# Patient Record
Sex: FEMALE | Race: BLACK OR AFRICAN AMERICAN | NOT HISPANIC OR LATINO
[De-identification: names, ages, dates, MRNs, and addresses within clinical notes are randomized per-mention and may not be internally consistent; named-entity substitution may affect disease eponyms.]

---

## 2017-03-22 ENCOUNTER — RESULT REVIEW (OUTPATIENT)
Age: 47
End: 2017-03-22

## 2017-05-25 ENCOUNTER — OUTPATIENT (OUTPATIENT)
Dept: OUTPATIENT SERVICES | Facility: HOSPITAL | Age: 47
LOS: 1 days | End: 2017-05-25
Payer: COMMERCIAL

## 2017-05-25 DIAGNOSIS — M79.606 PAIN IN LEG, UNSPECIFIED: ICD-10-CM

## 2017-05-25 PROCEDURE — 93970 EXTREMITY STUDY: CPT | Mod: 26

## 2017-05-25 PROCEDURE — 93970 EXTREMITY STUDY: CPT

## 2017-06-01 ENCOUNTER — APPOINTMENT (OUTPATIENT)
Dept: ENDOCRINOLOGY | Facility: CLINIC | Age: 47
End: 2017-06-01

## 2017-06-01 DIAGNOSIS — Z83.3 FAMILY HISTORY OF DIABETES MELLITUS: ICD-10-CM

## 2017-06-01 DIAGNOSIS — Z82.49 FAMILY HISTORY OF ISCHEMIC HEART DISEASE AND OTHER DISEASES OF THE CIRCULATORY SYSTEM: ICD-10-CM

## 2017-06-02 LAB — TSH SERPL-ACNC: 0.35 UIU/ML

## 2017-06-06 ENCOUNTER — APPOINTMENT (OUTPATIENT)
Dept: CARDIOLOGY | Facility: CLINIC | Age: 47
End: 2017-06-06

## 2017-06-06 VITALS
HEIGHT: 67 IN | OXYGEN SATURATION: 99 % | HEART RATE: 87 BPM | DIASTOLIC BLOOD PRESSURE: 81 MMHG | SYSTOLIC BLOOD PRESSURE: 122 MMHG

## 2017-10-20 ENCOUNTER — EMERGENCY (EMERGENCY)
Facility: HOSPITAL | Age: 47
LOS: 1 days | End: 2017-10-20
Attending: EMERGENCY MEDICINE | Admitting: EMERGENCY MEDICINE
Payer: COMMERCIAL

## 2017-10-20 VITALS
OXYGEN SATURATION: 100 % | TEMPERATURE: 98 F | HEART RATE: 73 BPM | DIASTOLIC BLOOD PRESSURE: 87 MMHG | RESPIRATION RATE: 18 BRPM | SYSTOLIC BLOOD PRESSURE: 137 MMHG

## 2017-10-20 VITALS
RESPIRATION RATE: 18 BRPM | HEART RATE: 59 BPM | SYSTOLIC BLOOD PRESSURE: 132 MMHG | OXYGEN SATURATION: 100 % | TEMPERATURE: 98 F

## 2017-10-20 LAB
ALBUMIN SERPL ELPH-MCNC: 4.5 G/DL — SIGNIFICANT CHANGE UP (ref 3.3–5)
ALP SERPL-CCNC: 50 U/L — SIGNIFICANT CHANGE UP (ref 40–120)
ALT FLD-CCNC: 10 U/L RC — SIGNIFICANT CHANGE UP (ref 10–45)
ANION GAP SERPL CALC-SCNC: 12 MMOL/L — SIGNIFICANT CHANGE UP (ref 5–17)
AST SERPL-CCNC: 20 U/L — SIGNIFICANT CHANGE UP (ref 10–40)
BASOPHILS # BLD AUTO: 0.1 K/UL — SIGNIFICANT CHANGE UP (ref 0–0.2)
BASOPHILS NFR BLD AUTO: 2.3 % — HIGH (ref 0–2)
BILIRUB SERPL-MCNC: 0.3 MG/DL — SIGNIFICANT CHANGE UP (ref 0.2–1.2)
BUN SERPL-MCNC: 14 MG/DL — SIGNIFICANT CHANGE UP (ref 7–23)
CALCIUM SERPL-MCNC: 9.3 MG/DL — SIGNIFICANT CHANGE UP (ref 8.4–10.5)
CHLORIDE SERPL-SCNC: 104 MMOL/L — SIGNIFICANT CHANGE UP (ref 96–108)
CK MB CFR SERPL CALC: 1.5 NG/ML — SIGNIFICANT CHANGE UP (ref 0–3.8)
CO2 SERPL-SCNC: 25 MMOL/L — SIGNIFICANT CHANGE UP (ref 22–31)
CREAT SERPL-MCNC: 1.17 MG/DL — SIGNIFICANT CHANGE UP (ref 0.5–1.3)
EOSINOPHIL # BLD AUTO: 0.1 K/UL — SIGNIFICANT CHANGE UP (ref 0–0.5)
EOSINOPHIL NFR BLD AUTO: 2.6 % — SIGNIFICANT CHANGE UP (ref 0–6)
GLUCOSE SERPL-MCNC: 77 MG/DL — SIGNIFICANT CHANGE UP (ref 70–99)
HCT VFR BLD CALC: 42.8 % — SIGNIFICANT CHANGE UP (ref 34.5–45)
HGB BLD-MCNC: 14.3 G/DL — SIGNIFICANT CHANGE UP (ref 11.5–15.5)
LYMPHOCYTES # BLD AUTO: 1.8 K/UL — SIGNIFICANT CHANGE UP (ref 1–3.3)
LYMPHOCYTES # BLD AUTO: 45.8 % — HIGH (ref 13–44)
MCHC RBC-ENTMCNC: 29.3 PG — SIGNIFICANT CHANGE UP (ref 27–34)
MCHC RBC-ENTMCNC: 33.3 GM/DL — SIGNIFICANT CHANGE UP (ref 32–36)
MCV RBC AUTO: 87.9 FL — SIGNIFICANT CHANGE UP (ref 80–100)
MONOCYTES # BLD AUTO: 0.3 K/UL — SIGNIFICANT CHANGE UP (ref 0–0.9)
MONOCYTES NFR BLD AUTO: 7 % — SIGNIFICANT CHANGE UP (ref 2–14)
NEUTROPHILS # BLD AUTO: 1.7 K/UL — LOW (ref 1.8–7.4)
NEUTROPHILS NFR BLD AUTO: 42.3 % — LOW (ref 43–77)
PLATELET # BLD AUTO: 165 K/UL — SIGNIFICANT CHANGE UP (ref 150–400)
POTASSIUM SERPL-MCNC: 3.9 MMOL/L — SIGNIFICANT CHANGE UP (ref 3.5–5.3)
POTASSIUM SERPL-SCNC: 3.9 MMOL/L — SIGNIFICANT CHANGE UP (ref 3.5–5.3)
PROT SERPL-MCNC: 8.1 G/DL — SIGNIFICANT CHANGE UP (ref 6–8.3)
RBC # BLD: 4.87 M/UL — SIGNIFICANT CHANGE UP (ref 3.8–5.2)
RBC # FLD: 14.1 % — SIGNIFICANT CHANGE UP (ref 10.3–14.5)
SODIUM SERPL-SCNC: 141 MMOL/L — SIGNIFICANT CHANGE UP (ref 135–145)
TROPONIN T SERPL-MCNC: <0.01 NG/ML — SIGNIFICANT CHANGE UP (ref 0–0.06)
TROPONIN T SERPL-MCNC: <0.01 NG/ML — SIGNIFICANT CHANGE UP (ref 0–0.06)
WBC # BLD: 3.9 K/UL — SIGNIFICANT CHANGE UP (ref 3.8–10.5)
WBC # FLD AUTO: 3.9 K/UL — SIGNIFICANT CHANGE UP (ref 3.8–10.5)

## 2017-10-20 PROCEDURE — 85027 COMPLETE CBC AUTOMATED: CPT

## 2017-10-20 PROCEDURE — 80053 COMPREHEN METABOLIC PANEL: CPT

## 2017-10-20 PROCEDURE — 71046 X-RAY EXAM CHEST 2 VIEWS: CPT

## 2017-10-20 PROCEDURE — 93005 ELECTROCARDIOGRAM TRACING: CPT | Mod: 76

## 2017-10-20 PROCEDURE — 84484 ASSAY OF TROPONIN QUANT: CPT

## 2017-10-20 PROCEDURE — 71020: CPT | Mod: 26

## 2017-10-20 PROCEDURE — 99285 EMERGENCY DEPT VISIT HI MDM: CPT | Mod: 25

## 2017-10-20 PROCEDURE — 93010 ELECTROCARDIOGRAM REPORT: CPT

## 2017-10-20 PROCEDURE — 82553 CREATINE MB FRACTION: CPT

## 2017-10-20 PROCEDURE — 99284 EMERGENCY DEPT VISIT MOD MDM: CPT | Mod: 25

## 2017-10-20 RX ORDER — SODIUM CHLORIDE 9 MG/ML
3 INJECTION INTRAMUSCULAR; INTRAVENOUS; SUBCUTANEOUS ONCE
Qty: 0 | Refills: 0 | Status: COMPLETED | OUTPATIENT
Start: 2017-10-20 | End: 2017-10-20

## 2017-10-20 RX ORDER — ASPIRIN/CALCIUM CARB/MAGNESIUM 324 MG
81 TABLET ORAL ONCE
Qty: 0 | Refills: 0 | Status: COMPLETED | OUTPATIENT
Start: 2017-10-20 | End: 2017-10-20

## 2017-10-20 RX ADMIN — SODIUM CHLORIDE 3 MILLILITER(S): 9 INJECTION INTRAMUSCULAR; INTRAVENOUS; SUBCUTANEOUS at 16:14

## 2017-10-20 RX ADMIN — Medication 81 MILLIGRAM(S): at 16:14

## 2017-10-20 NOTE — ED PROVIDER NOTE - PROGRESS NOTE DETAILS
Patient wants outpt follow-up after 2nd trop, offered CDU stay.  - Mike Balderrama MD ATTD: chest pain improved. Cardiology evaluated patient, recommended check serial enzymes. discussed with patient CDU vs out pt and she would prefer outpt follow up with her cardiologist Dr. Waters and pmd.

## 2017-10-20 NOTE — ED ADULT NURSE NOTE - OBJECTIVE STATEMENT
47 yr old female amb to ed c/o midsternal chest pressure rad l jaw x2 hrs agp Afebrile denies sweating denies sob Resp even and nonlab denies abd pain denies n/v Works in [employer]

## 2017-10-20 NOTE — ED PROVIDER NOTE - OBJECTIVE STATEMENT
47y Female complaining of chest pain since last night. Low risk, has had a stress test last year. 47y Female PMH fibroids, hypothyroidism (no DM, HTN or HLD) complaining of chest pain since last night, persistent to this afternoon. Has had this type of pain in the past, has had a stress test last year that was unremarkable. Mainly in the substernal area to the jaw, no other radiation. Denies fevers, chills, nausea, vomiting, diarrhea, constipation, chest pain, or dyspnea. No obvious sick contacts, no recent travel, no leg swelling or pain. Patient works in the cardiology department, works closely with in house cardiology teams.    PCP / Cardiologist: Dr. Eris López

## 2017-10-20 NOTE — ED PROVIDER NOTE - CARE PLAN
Principal Discharge DX:	Chest pain  Instructions for follow-up, activity and diet:	1) Please follow-up with your primary care doctor / cardiologist Dr. Eirs López within the next 1-2 days.  Please call today or tomorrow for an appointment.  If you cannot follow-up with your doctor(s), please return to the ED for any urgent issues.  2) If you have any worsening of symptoms or any other concerns please return to the ED immediately.  3) Please continue taking your home medications as directed.  4) You may have been given a copy of your labs and/or imaging.  Please go over these with your primary care doctor.

## 2017-10-20 NOTE — ED PROVIDER NOTE - PLAN OF CARE
1) Please follow-up with your primary care doctor / cardiologist Dr. Eris López within the next 1-2 days.  Please call today or tomorrow for an appointment.  If you cannot follow-up with your doctor(s), please return to the ED for any urgent issues.  2) If you have any worsening of symptoms or any other concerns please return to the ED immediately.  3) Please continue taking your home medications as directed.  4) You may have been given a copy of your labs and/or imaging.  Please go over these with your primary care doctor.

## 2017-10-20 NOTE — CONSULT NOTE ADULT - SUBJECTIVE AND OBJECTIVE BOX
Patient seen and evaluated @ 5:45 PM  Chief Complaint: Chest pain    HPI:  47F with hypothyroidism s/p thyroidectomy, hx of myomectomy presents for chest pressure onset yesterday. Patient was at work in the cath lab, onset of chest pressure radiating to jaw but not left arm. No diaphoresis, nausea, palpitations. Mild SOB with initial onset. Pain is 5/10 without radiation. Has not improved since yesterday. No recent travel. No hx of DVT/PE.     No prior hx of chest pain.    Dr. Nestor López is patient's PMD.     PMH:   Chronic back pain  Obese  Cold thyroid nodule  Fibroids  Hypothyroidism    PSH:   H/O arthroscopy of shoulder  History of subtotal thyroidectomy  H/O myomectomy    Medications:  none    Allergies:  No Known Allergies    FAMILY HISTORY:  Family history of diabetic complications (Sibling, Sibling)  Family history of congestive heart failure (Father)  Family history of hypertension (Father)    Social History:  No significant illicit drug use    Review of Systems:  Constitutional: [ ] Fever [ ] Chills [ ] Fatigue [ ] Weight change   HEENT: [ ] Blurred vision [ ] Eye Pain [ ] Headache [ ] Runny nose [ ] Sore Throat   Respiratory: [ ] Cough [ ] Wheezing [ ] Shortness of breath  Cardiovascular: [x] Chest Pain [ ] Palpitations [ ] TORRES [ ] PND [ ] Orthopnea  Gastrointestinal: [ ] Abdominal Pain [ ] Diarrhea [ ] Constipation [ ] Hemorrhoids [ ] Nausea [ ] Vomiting  Genitourinary: [ ] Nocturia [ ] Dysuria [ ] Incontinence  Extremities: [ ] Swelling [ ] Joint Pain  Neurologic: [ ] Focal deficit [ ] Paresthesias [ ] Syncope  Lymphatic: [ ] Swelling [ ] Lymphadenopathy   Skin: [ ] Rash [ ] Ecchymoses [ ] Wounds [ ] Lesions  Psychiatry: [ ] Depression [ ] Suicidal/Homicidal Ideation [ ] Anxiety [ ] Sleep Disturbances  [ ] 10 point review of systems is otherwise negative except as mentioned above            [ ]Unable to obtain    Physical Exam:  T(C): 36.7 (10-20-17 @ 16:05), Max: 36.7 (10-20-17 @ 16:05)  HR: 73 (10-20-17 @ 16:05) (73 - 73)  BP: 137/87 (10-20-17 @ 16:05) (137/87 - 137/87)  RR: 18 (10-20-17 @ 16:05) (18 - 18)  SpO2: 100% (10-20-17 @ 16:05) (100% - 100%)  Wt(kg): --    Daily     Daily     Appearance: NAD  Eyes: PERRL, EOMI  HENT: Normal oral [place], NC/AT  Cardiovascular: normal S1 and S2, RRR, no m/r/g, no edema, normal JVP  Respiratory: Clear to auscultation bilaterally  Gastrointestinal: Soft, non-tender, non-distended, BS+  Musculoskeletal: No clubbing, no joint deformity   Neurologic: Non-focal  Lymphatic: No lymphadenopathy  Psychiatry: AAOx3, mood & affect appropriate  Skin: No rashes, no ecchymoses, no cyanosis    Cardiovascular Diagnostic Testing:  ECG: NSR 60 bpm, LAFB, nonspecific ST-T wave changes similar to prior    Stress Testing/Echo:   11/10/16  Echocardiographic Study:  (A) Baseline echocardiogram reveals:  1. Normal left ventricular internal dimensions and wall  thicknesses.  2. Normal left ventricular systolic function. No segmental  wall motion abnormalities.  3. Normal diastolic function  4. Normal right ventricular size and function.  5. Estimated pulmonary artery systolic pressure equals 27  mm Hg, assuming right atrial pressure equals 8  mm Hg,  consistent with normal pulmonary pressures.  (B) Stress echocardiogram reveals:  Normal augmentation in left ventricular systolic function.  ------------------------------------------------------------------------  Conclusions:  1. Exercise capacity is 11 METS, which is excellent for age  and gender.  2. Normal hemodynamic response.  3. Normal electrocardiographic response.  4. Normal augmentation in left ventricular systolic  function.  5. Normal stress echocardiogram with no evidence of  inducible ischemia.  *** No previous Echo exam.    Cath: none    Interpretation of Telemetry:    Imaging:  CXR no signs of acute cardiopulmonary disease    Labs:                        14.3   3.9   )-----------( 165      ( 20 Oct 2017 16:10 )             42.8     10-20    141  |  104  |  14  ----------------------------<  77  3.9   |  25  |  1.17    Ca    9.3      20 Oct 2017 16:10    TPro  8.1  /  Alb  4.5  /  TBili  0.3  /  DBili  x   /  AST  20  /  ALT  10  /  AlkPhos  50  10-20      CARDIAC MARKERS ( 20 Oct 2017 16:10 )  x     / <0.01 ng/mL / x     / x     / 1.5 ng/mL

## 2017-10-20 NOTE — ED PROVIDER NOTE - ATTENDING CONTRIBUTION TO CARE
48 y/o f with pmhx hypothyroid, s/p thyroidectomy, myomectomy presents for chest pressure that began earlier today. 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 Last had a stress test approx 1 yr ago and was told normal. no fever. no cough no chills. no abd pain. no weakness no numbness. no abd pain. no recent travel. denies tob. no recent travel. no back pain. no tearing pain.   Cardiologist Dr. Nestor Waters  Gen. no resp distress, appears mild discomfort from chest pain.  Non toxic   HEENT: EOMI, mmm,   Lungs: CTAB/L no C/ W /R   CVS: S1S2   Abd; Soft non tender, non distended   Ext: no edema   Neuro AAOx3 non focal clear speech

## 2017-10-20 NOTE — ED PROVIDER NOTE - PMH
Chronic back pain  on valium PRN for spasm  Cold thyroid nodule    Fibroids    Hypothyroidism    Obese

## 2017-10-20 NOTE — CONSULT NOTE ADULT - ASSESSMENT
47F with hypothyroidism s/p thyroidectomy, hx of myomectomy presents for chest pressure concerning for UA however no prior cardiac history. MASOOD score 75 correlates with 0.4% of in-hospital death. Reporting a significant amount of stress, ?Takotsubo's CM.    --  mg  -- Atorvastatin 80 mg  -- would defer ticagrelor currently  -- would defer heparin gtt currently  -- repeat second set of troponin  -- if negative would consider discharge with close follow up vs CDU admission for TTE in AM  -- trial of nitrate therapy  -- trend troponin  -- serial EKG  -- check BNP    Bhraathi Valentine MD

## 2017-10-24 ENCOUNTER — RESULT REVIEW (OUTPATIENT)
Age: 47
End: 2017-10-24

## 2017-10-24 ENCOUNTER — APPOINTMENT (OUTPATIENT)
Dept: HUMAN REPRODUCTION | Facility: CLINIC | Age: 47
End: 2017-10-24
Payer: COMMERCIAL

## 2017-10-24 PROCEDURE — 99214 OFFICE O/P EST MOD 30 MIN: CPT | Mod: 25

## 2017-10-24 PROCEDURE — 58340 CATHETER FOR HYSTEROGRAPHY: CPT

## 2017-10-24 PROCEDURE — 76830 TRANSVAGINAL US NON-OB: CPT

## 2017-10-24 PROCEDURE — 58999 UNLISTED PX FML GENITAL SYS: CPT

## 2017-10-24 PROCEDURE — 81025 URINE PREGNANCY TEST: CPT

## 2017-10-31 ENCOUNTER — OTHER (OUTPATIENT)
Age: 47
End: 2017-10-31

## 2017-11-03 ENCOUNTER — APPOINTMENT (OUTPATIENT)
Dept: CARDIOLOGY | Facility: CLINIC | Age: 47
End: 2017-11-03

## 2017-11-03 ENCOUNTER — OUTPATIENT (OUTPATIENT)
Dept: OUTPATIENT SERVICES | Facility: HOSPITAL | Age: 47
LOS: 1 days | End: 2017-11-03
Payer: COMMERCIAL

## 2017-11-03 DIAGNOSIS — R07.9 CHEST PAIN, UNSPECIFIED: ICD-10-CM

## 2017-11-03 DIAGNOSIS — R94.31 ABNORMAL ELECTROCARDIOGRAM [ECG] [EKG]: ICD-10-CM

## 2017-11-03 DIAGNOSIS — R07.89 OTHER CHEST PAIN: ICD-10-CM

## 2017-11-03 PROCEDURE — 75574 CT ANGIO HRT W/3D IMAGE: CPT

## 2017-11-03 PROCEDURE — 75574 CT ANGIO HRT W/3D IMAGE: CPT | Mod: 26

## 2018-05-02 ENCOUNTER — RESULT REVIEW (OUTPATIENT)
Age: 48
End: 2018-05-02

## 2018-05-16 ENCOUNTER — RESULT REVIEW (OUTPATIENT)
Age: 48
End: 2018-05-16

## 2018-08-09 ENCOUNTER — OUTPATIENT (OUTPATIENT)
Dept: OUTPATIENT SERVICES | Facility: HOSPITAL | Age: 48
LOS: 1 days | End: 2018-08-09
Payer: COMMERCIAL

## 2018-08-09 ENCOUNTER — APPOINTMENT (OUTPATIENT)
Dept: CARDIOLOGY | Facility: CLINIC | Age: 48
End: 2018-08-09
Payer: COMMERCIAL

## 2018-08-09 ENCOUNTER — NON-APPOINTMENT (OUTPATIENT)
Age: 48
End: 2018-08-09

## 2018-08-09 ENCOUNTER — CHART COPY (OUTPATIENT)
Age: 48
End: 2018-08-09

## 2018-08-09 VITALS
BODY MASS INDEX: 33.43 KG/M2 | SYSTOLIC BLOOD PRESSURE: 129 MMHG | HEART RATE: 70 BPM | HEIGHT: 67 IN | WEIGHT: 213 LBS | OXYGEN SATURATION: 97 % | DIASTOLIC BLOOD PRESSURE: 84 MMHG

## 2018-08-09 DIAGNOSIS — R07.89 OTHER CHEST PAIN: ICD-10-CM

## 2018-08-09 PROCEDURE — 71046 X-RAY EXAM CHEST 2 VIEWS: CPT | Mod: 26

## 2018-08-09 PROCEDURE — 99214 OFFICE O/P EST MOD 30 MIN: CPT

## 2018-08-09 PROCEDURE — 93000 ELECTROCARDIOGRAM COMPLETE: CPT

## 2018-08-09 PROCEDURE — 71046 X-RAY EXAM CHEST 2 VIEWS: CPT

## 2018-08-09 RX ORDER — CYCLOBENZAPRINE HYDROCHLORIDE 10 MG/1
10 TABLET, FILM COATED ORAL
Qty: 30 | Refills: 0 | Status: DISCONTINUED | COMMUNITY
Start: 2017-06-01 | End: 2018-08-09

## 2018-08-09 RX ORDER — FAMOTIDINE 20 MG/1
20 TABLET, FILM COATED ORAL
Qty: 30 | Refills: 0 | Status: DISCONTINUED | COMMUNITY
Start: 2017-06-06 | End: 2018-08-09

## 2018-10-05 ENCOUNTER — OTHER (OUTPATIENT)
Age: 48
End: 2018-10-05

## 2018-10-25 ENCOUNTER — APPOINTMENT (OUTPATIENT)
Dept: ENDOCRINOLOGY | Facility: CLINIC | Age: 48
End: 2018-10-25
Payer: COMMERCIAL

## 2018-10-25 VITALS
SYSTOLIC BLOOD PRESSURE: 122 MMHG | DIASTOLIC BLOOD PRESSURE: 80 MMHG | HEIGHT: 67 IN | OXYGEN SATURATION: 98 % | HEART RATE: 78 BPM | BODY MASS INDEX: 34.18 KG/M2 | WEIGHT: 217.8 LBS

## 2018-10-25 LAB
ALBUMIN SERPL ELPH-MCNC: 4.1 G/DL
ALP BLD-CCNC: 40 U/L
ALT SERPL-CCNC: 7 U/L
ANION GAP SERPL CALC-SCNC: 11 MMOL/L
AST SERPL-CCNC: 15 U/L
BILIRUB SERPL-MCNC: 0.3 MG/DL
BUN SERPL-MCNC: 12 MG/DL
CALCIUM SERPL-MCNC: 9.1 MG/DL
CHLORIDE SERPL-SCNC: 105 MMOL/L
CO2 SERPL-SCNC: 26 MMOL/L
CREAT SERPL-MCNC: 0.8 MG/DL
GLUCOSE SERPL-MCNC: 86 MG/DL
HBA1C MFR BLD HPLC: 4.4 %
POTASSIUM SERPL-SCNC: 4 MMOL/L
PROT SERPL-MCNC: 6.7 G/DL
SODIUM SERPL-SCNC: 142 MMOL/L
TSH SERPL-ACNC: 0.46 UIU/ML

## 2018-10-25 PROCEDURE — 99213 OFFICE O/P EST LOW 20 MIN: CPT

## 2018-10-25 RX ORDER — AZITHROMYCIN 250 MG/1
250 TABLET, FILM COATED ORAL
Qty: 6 | Refills: 0 | Status: DISCONTINUED | COMMUNITY
Start: 2018-08-11 | End: 2018-10-25

## 2018-11-20 ENCOUNTER — APPOINTMENT (OUTPATIENT)
Dept: HUMAN REPRODUCTION | Facility: CLINIC | Age: 48
End: 2018-11-20
Payer: COMMERCIAL

## 2018-11-20 PROCEDURE — 99214 OFFICE O/P EST MOD 30 MIN: CPT

## 2018-11-26 ENCOUNTER — FORM ENCOUNTER (OUTPATIENT)
Age: 48
End: 2018-11-26

## 2018-11-27 ENCOUNTER — APPOINTMENT (OUTPATIENT)
Dept: ULTRASOUND IMAGING | Facility: IMAGING CENTER | Age: 48
End: 2018-11-27
Payer: COMMERCIAL

## 2018-11-27 ENCOUNTER — OUTPATIENT (OUTPATIENT)
Dept: OUTPATIENT SERVICES | Facility: HOSPITAL | Age: 48
LOS: 1 days | End: 2018-11-27
Payer: COMMERCIAL

## 2018-11-27 DIAGNOSIS — E03.9 HYPOTHYROIDISM, UNSPECIFIED: ICD-10-CM

## 2018-11-27 PROCEDURE — 76536 US EXAM OF HEAD AND NECK: CPT

## 2018-11-27 PROCEDURE — 76536 US EXAM OF HEAD AND NECK: CPT | Mod: 26

## 2018-12-19 ENCOUNTER — APPOINTMENT (OUTPATIENT)
Dept: ULTRASOUND IMAGING | Facility: HOSPITAL | Age: 48
End: 2018-12-19

## 2018-12-19 ENCOUNTER — OUTPATIENT (OUTPATIENT)
Dept: OUTPATIENT SERVICES | Facility: HOSPITAL | Age: 48
LOS: 1 days | End: 2018-12-19
Payer: COMMERCIAL

## 2018-12-19 DIAGNOSIS — D25.9 LEIOMYOMA OF UTERUS, UNSPECIFIED: ICD-10-CM

## 2018-12-19 PROCEDURE — 58340 CATHETER FOR HYSTEROGRAPHY: CPT

## 2018-12-19 PROCEDURE — 76377 3D RENDER W/INTRP POSTPROCES: CPT | Mod: 26

## 2018-12-19 PROCEDURE — 76831 ECHO EXAM UTERUS: CPT

## 2018-12-19 PROCEDURE — 76831 ECHO EXAM UTERUS: CPT | Mod: 26

## 2018-12-19 PROCEDURE — 76377 3D RENDER W/INTRP POSTPROCES: CPT

## 2019-02-12 NOTE — ED PROVIDER NOTE - MEDICAL DECISION MAKING DETAILS
Patient/Mother ATTD: chest pain: concern for cardiac cause, check labs, check cardiac work up, ASA, cardiac monitor, check xray chest, re eval for dispo

## 2019-02-27 ENCOUNTER — APPOINTMENT (OUTPATIENT)
Dept: OBGYN | Facility: CLINIC | Age: 49
End: 2019-02-27
Payer: COMMERCIAL

## 2019-02-27 PROCEDURE — 99243 OFF/OP CNSLTJ NEW/EST LOW 30: CPT

## 2019-04-02 ENCOUNTER — APPOINTMENT (OUTPATIENT)
Dept: HUMAN REPRODUCTION | Facility: CLINIC | Age: 49
End: 2019-04-02
Payer: COMMERCIAL

## 2019-04-02 PROCEDURE — 99215 OFFICE O/P EST HI 40 MIN: CPT | Mod: 25

## 2019-04-02 PROCEDURE — 36415 COLL VENOUS BLD VENIPUNCTURE: CPT

## 2019-04-04 ENCOUNTER — APPOINTMENT (OUTPATIENT)
Dept: CARDIOLOGY | Facility: CLINIC | Age: 49
End: 2019-04-04
Payer: COMMERCIAL

## 2019-04-04 ENCOUNTER — APPOINTMENT (OUTPATIENT)
Dept: CARDIOLOGY | Facility: CLINIC | Age: 49
End: 2019-04-04

## 2019-04-04 VITALS
SYSTOLIC BLOOD PRESSURE: 151 MMHG | DIASTOLIC BLOOD PRESSURE: 89 MMHG | HEART RATE: 62 BPM | BODY MASS INDEX: 32.96 KG/M2 | WEIGHT: 210 LBS | HEIGHT: 67 IN | OXYGEN SATURATION: 99 %

## 2019-04-04 PROCEDURE — 93000 ELECTROCARDIOGRAM COMPLETE: CPT

## 2019-04-04 PROCEDURE — 99214 OFFICE O/P EST MOD 30 MIN: CPT

## 2019-04-07 ENCOUNTER — NON-APPOINTMENT (OUTPATIENT)
Age: 49
End: 2019-04-07

## 2019-04-07 NOTE — REASON FOR VISIT
[Follow-Up - Clinic] : a clinic follow-up of [Chest Pain] : chest pain [Medication Management] : Medication management

## 2019-04-07 NOTE — HISTORY OF PRESENT ILLNESS
[FreeTextEntry1] : Em is presenting to the office for clearance prior to IVF\par No cardiac symptoms - no cp, SOB, TORRES, palpitations, LE edema or syncope\par Prior, extensive, cardiac w/u reviewed\par \par

## 2019-04-07 NOTE — DISCUSSION/SUMMARY
[FreeTextEntry1] : May proceed with IVF. There are no cardiac contraindications in proceeding with the plan for IVF\par

## 2019-04-07 NOTE — PHYSICAL EXAM
[General Appearance - Well Developed] : well developed [Normal Appearance] : normal appearance [Well Groomed] : well groomed [General Appearance - Well Nourished] : well nourished [No Deformities] : no deformities [General Appearance - In No Acute Distress] : no acute distress [Normal Conjunctiva] : the conjunctiva exhibited no abnormalities [Eyelids - No Xanthelasma] : the eyelids demonstrated no xanthelasmas [Normal Oral Mucosa] : normal oral mucosa [No Oral Pallor] : no oral pallor [No Oral Cyanosis] : no oral cyanosis [Normal Jugular Venous A Waves Present] : normal jugular venous A waves present [Normal Jugular Venous V Waves Present] : normal jugular venous V waves present [No Jugular Venous Jane A Waves] : no jugular venous jane A waves [Respiration, Rhythm And Depth] : normal respiratory rhythm and effort [Exaggerated Use Of Accessory Muscles For Inspiration] : no accessory muscle use [Auscultation Breath Sounds / Voice Sounds] : lungs were clear to auscultation bilaterally [Heart Rate And Rhythm] : heart rate and rhythm were normal [Heart Sounds] : normal S1 and S2 [Murmurs] : no murmurs present [Abdomen Soft] : soft [Abdomen Tenderness] : non-tender [Abdomen Mass (___ Cm)] : no abdominal mass palpated [Abnormal Walk] : normal gait [Gait - Sufficient For Exercise Testing] : the gait was sufficient for exercise testing [Nail Clubbing] : no clubbing of the fingernails [Cyanosis, Localized] : no localized cyanosis [Petechial Hemorrhages (___cm)] : no petechial hemorrhages [Skin Color & Pigmentation] : normal skin color and pigmentation [] : no rash [No Venous Stasis] : no venous stasis [Skin Lesions] : no skin lesions [No Skin Ulcers] : no skin ulcer [No Xanthoma] : no  xanthoma was observed [Oriented To Time, Place, And Person] : oriented to person, place, and time [Affect] : the affect was normal [Mood] : the mood was normal [No Anxiety] : not feeling anxious

## 2019-05-07 ENCOUNTER — ASOB RESULT (OUTPATIENT)
Age: 49
End: 2019-05-07

## 2019-05-07 ENCOUNTER — APPOINTMENT (OUTPATIENT)
Dept: MATERNAL FETAL MEDICINE | Facility: CLINIC | Age: 49
End: 2019-05-07
Payer: COMMERCIAL

## 2019-05-07 ENCOUNTER — APPOINTMENT (OUTPATIENT)
Dept: ANTEPARTUM | Facility: CLINIC | Age: 49
End: 2019-05-07

## 2019-05-07 VITALS
WEIGHT: 215.38 LBS | BODY MASS INDEX: 33.8 KG/M2 | DIASTOLIC BLOOD PRESSURE: 82 MMHG | SYSTOLIC BLOOD PRESSURE: 132 MMHG | HEIGHT: 67 IN

## 2019-05-07 DIAGNOSIS — Z31.69 ENCOUNTER FOR OTHER GENERAL COUNSELING AND ADVICE ON PROCREATION: ICD-10-CM

## 2019-05-07 PROCEDURE — 99243 OFF/OP CNSLTJ NEW/EST LOW 30: CPT | Mod: 25

## 2019-05-13 LAB
ALBUMIN SERPL ELPH-MCNC: 4.1 G/DL
ALP BLD-CCNC: 53 U/L
ALT SERPL-CCNC: 12 U/L
ANION GAP SERPL CALC-SCNC: 10 MMOL/L
AST SERPL-CCNC: 18 U/L
BILIRUB SERPL-MCNC: 0.4 MG/DL
BSA DERIVED: 1.73 M2
BUN SERPL-MCNC: 15 MG/DL
CALCIUM SERPL-MCNC: 9.4 MG/DL
CHLORIDE SERPL-SCNC: 106 MMOL/L
CO2 SERPL-SCNC: 28 MMOL/L
CREAT 24H UR-MCNC: 1.9 G/24 H
CREAT 24H UR-MCNC: 1.9 G/24 H
CREAT ?TM UR-MCNC: 74 MG/DL
CREAT ?TM UR-MCNC: 74 MG/DL
CREAT CL 24H UR+SERPL-VRATE: 145 ML/MIN
CREAT SERPL-MCNC: 0.92 MG/DL
GLUCOSE SERPL-MCNC: 83 MG/DL
POTASSIUM SERPL-SCNC: 4 MMOL/L
PROT 24H UR-MRATE: 7 MG/DL
PROT ?TM UR-MCNC: 24 HR
PROT ?TM UR-MCNC: 24 HR
PROT SERPL-MCNC: 6.8 G/DL
PROT UR-MCNC: 182 MG/24 H
SODIUM SERPL-SCNC: 144 MMOL/L
SPECIMEN VOL 24H UR: 2600 ML
SPECIMEN VOL 24H UR: 2600 ML

## 2019-05-22 ENCOUNTER — APPOINTMENT (OUTPATIENT)
Dept: HUMAN REPRODUCTION | Facility: CLINIC | Age: 49
End: 2019-05-22
Payer: COMMERCIAL

## 2019-05-22 PROCEDURE — 99215 OFFICE O/P EST HI 40 MIN: CPT

## 2019-06-02 ENCOUNTER — EMERGENCY (EMERGENCY)
Facility: HOSPITAL | Age: 49
LOS: 1 days | Discharge: HOME | End: 2019-06-02
Attending: EMERGENCY MEDICINE | Admitting: EMERGENCY MEDICINE
Payer: COMMERCIAL

## 2019-06-02 VITALS
WEIGHT: 205.91 LBS | TEMPERATURE: 99 F | OXYGEN SATURATION: 99 % | DIASTOLIC BLOOD PRESSURE: 81 MMHG | SYSTOLIC BLOOD PRESSURE: 139 MMHG | HEART RATE: 87 BPM | RESPIRATION RATE: 18 BRPM

## 2019-06-02 VITALS
DIASTOLIC BLOOD PRESSURE: 89 MMHG | SYSTOLIC BLOOD PRESSURE: 135 MMHG | OXYGEN SATURATION: 98 % | HEART RATE: 66 BPM | TEMPERATURE: 99 F | RESPIRATION RATE: 14 BRPM

## 2019-06-02 DIAGNOSIS — S89.90XA UNSPECIFIED INJURY OF UNSPECIFIED LOWER LEG, INITIAL ENCOUNTER: ICD-10-CM

## 2019-06-02 PROCEDURE — 29515 APPLICATION SHORT LEG SPLINT: CPT

## 2019-06-02 PROCEDURE — 99284 EMERGENCY DEPT VISIT MOD MDM: CPT | Mod: 25

## 2019-06-02 PROCEDURE — 73562 X-RAY EXAM OF KNEE 3: CPT

## 2019-06-02 PROCEDURE — 99283 EMERGENCY DEPT VISIT LOW MDM: CPT | Mod: 25

## 2019-06-02 PROCEDURE — 73610 X-RAY EXAM OF ANKLE: CPT

## 2019-06-02 PROCEDURE — 73610 X-RAY EXAM OF ANKLE: CPT | Mod: 26,LT

## 2019-06-02 PROCEDURE — 73562 X-RAY EXAM OF KNEE 3: CPT | Mod: 26,50

## 2019-06-02 RX ORDER — ACETAMINOPHEN 500 MG
1000 TABLET ORAL ONCE
Refills: 0 | Status: DISCONTINUED | OUTPATIENT
Start: 2019-06-02 | End: 2019-06-02

## 2019-06-02 RX ORDER — ACETAMINOPHEN 500 MG
975 TABLET ORAL ONCE
Refills: 0 | Status: COMPLETED | OUTPATIENT
Start: 2019-06-02 | End: 2019-06-02

## 2019-06-02 RX ADMIN — Medication 975 MILLIGRAM(S): at 18:35

## 2019-06-02 NOTE — ED PROCEDURE NOTE - CPROC ED POST PROC CARE GUIDE1
Verbal/written post procedure instructions were given to patient/caregiver./Elevate the injured extremity as instructed./Keep the cast/splint/dressing clean and dry.

## 2019-06-02 NOTE — ED ADULT NURSE NOTE - OBJECTIVE STATEMENT
Pt arrived to the ER c/o ankle and knee pain. Pt states "I feel down the stairs at home at 2pm today." Pt c/o left ankle and B/L knee pain 8/10. Pt denies hitting her head and no LOC. Pt states she is unable to wear weight on left extremity.

## 2019-06-02 NOTE — ED ADULT NURSE NOTE - NSIMPLEMENTINTERV_GEN_ALL_ED
Implemented All Fall Risk Interventions:  [place] to call system. Call bell, personal items and telephone within reach. Instruct patient to call for assistance. Room bathroom lighting operational. Non-slip footwear when patient is off stretcher. Physically safe environment: no spills, clutter or unnecessary equipment. Stretcher in lowest position, wheels locked, appropriate side rails in place. Provide visual cue, wrist band, yellow gown, etc. Monitor gait and stability. Monitor for mental status changes and reorient to person, place, and time. Review medications for side effects contributing to fall risk. Reinforce activity limits and safety measures with patient and family.

## 2019-06-02 NOTE — ED PROVIDER NOTE - OBJECTIVE STATEMENT
49 yr old female with hx of hypothyroid presents s/p slip and fall down basement steps, twisting left ankle, falling to ground c/o left ankle and b/l knee pain. Denies hitting head or LOC. Pt ambulating with cane, pain with weight bearing on left ankle. Pt took alleve at 3 pm.

## 2019-06-02 NOTE — ED PROVIDER NOTE - PHYSICAL EXAMINATION
left ankle- positive lateral malleolus, with swelling, no deformity seen, positive 2+ pedal pulses, less than 2 sec cap refill   b/l knee: medial tenderness, no laxity, no swelling, positive ROM   pt ambulating with cane

## 2019-06-02 NOTE — ED ADULT NURSE NOTE - PSH
H/O arthroscopy of shoulder  right-2012  H/O myomectomy  2006, 2012  History of subtotal thyroidectomy  2005

## 2019-06-02 NOTE — ED PROVIDER NOTE - CARE PROVIDER_API CALL
Nick Wolf (MD)  Orthopaedic Surgery  [address] [place] [address], [address]  [place] [address]  Phone: [phone]  Fax: [phone]  Follow Up Time:

## 2019-06-02 NOTE — ED PROVIDER NOTE - CLINICAL SUMMARY MEDICAL DECISION MAKING FREE TEXT BOX
49 yr old female with hx of hypothyroid presents s/p slip and fall down basement steps, twisting left ankle, falling to ground c/o left ankle and b/l knee pain. Denies hitting head or LOC. Pt ambulating with cane, pain with weight bearing on left ankle. Pt took alleve at 3 pm.  left ankle- positive lateral malleolus, with swelling, no deformity seen, positive 2+ pedal pulses, less than 2 sec cap refill   b/l knee: medial tenderness, no laxity, no swelling, positive ROM   pt ambulating with cane  pt offered pain meds - does not want any in ED   xray b/l knee, left ankle

## 2019-06-02 NOTE — ED PROVIDER NOTE - ATTENDING CONTRIBUTION TO CARE
Dr. Johnson: I performed a face to face bedside interview with patient regarding history of present illness, review of symptoms and past medical history. I completed an independent physical exam.  I have discussed patient's plan of care with PA.   I agree with note as stated above, having amended the EMR as needed to reflect my findings.   This includes HISTORY OF PRESENT ILLNESS, HIV, PAST MEDICAL/SURGICAL/FAMILY/SOCIAL HISTORY, ALLERGIES AND HOME MEDICATIONS, REVIEW OF SYSTEMS, PHYSICAL EXAM, and any PROGRESS NOTES during the time I functioned as the attending physician for this patient.  Gen:  Well appearning in NAD  Head:  NC/AT  Resp: No distress   Ext: no deformities. left ankle with swelling and ttp  Skin: warm and dry as visualized

## 2019-06-03 ENCOUNTER — OTHER (OUTPATIENT)
Age: 49
End: 2019-06-03

## 2019-06-06 ENCOUNTER — APPOINTMENT (OUTPATIENT)
Dept: MRI IMAGING | Facility: CLINIC | Age: 49
End: 2019-06-06
Payer: COMMERCIAL

## 2019-06-06 ENCOUNTER — OUTPATIENT (OUTPATIENT)
Dept: OUTPATIENT SERVICES | Facility: HOSPITAL | Age: 49
LOS: 1 days | End: 2019-06-06
Payer: COMMERCIAL

## 2019-06-06 DIAGNOSIS — S93.402A SPRAIN OF UNSPECIFIED LIGAMENT OF LEFT ANKLE, INITIAL ENCOUNTER: ICD-10-CM

## 2019-06-06 PROCEDURE — 73721 MRI JNT OF LWR EXTRE W/O DYE: CPT | Mod: 26,LT

## 2019-06-06 PROCEDURE — 73721 MRI JNT OF LWR EXTRE W/O DYE: CPT

## 2019-06-12 ENCOUNTER — OTHER (OUTPATIENT)
Age: 49
End: 2019-06-12

## 2019-08-08 ENCOUNTER — APPOINTMENT (OUTPATIENT)
Dept: OTOLARYNGOLOGY | Facility: CLINIC | Age: 49
End: 2019-08-08
Payer: COMMERCIAL

## 2019-08-08 VITALS
SYSTOLIC BLOOD PRESSURE: 136 MMHG | BODY MASS INDEX: 32.96 KG/M2 | HEIGHT: 67 IN | DIASTOLIC BLOOD PRESSURE: 86 MMHG | HEART RATE: 66 BPM | WEIGHT: 210 LBS

## 2019-08-08 DIAGNOSIS — H69.83 OTHER SPECIFIED DISORDERS OF EUSTACHIAN TUBE, BILATERAL: ICD-10-CM

## 2019-08-08 DIAGNOSIS — E07.9 DISORDER OF THYROID, UNSPECIFIED: ICD-10-CM

## 2019-08-08 PROCEDURE — 92567 TYMPANOMETRY: CPT

## 2019-08-08 PROCEDURE — 92557 COMPREHENSIVE HEARING TEST: CPT

## 2019-08-08 PROCEDURE — G0268 REMOVAL OF IMPACTED WAX MD: CPT

## 2019-08-08 PROCEDURE — 99204 OFFICE O/P NEW MOD 45 MIN: CPT | Mod: 25

## 2019-08-08 NOTE — HISTORY OF PRESENT ILLNESS
[de-identified] : Patient is here today with an ear pain that was mostly right sided but it has subsided. She does not havea ny issues with ringing in the ears or drainage from the ears. SHe is not having any dizziness. SHe admits that the right ear felt clogged and muffled a few weeks ago but as she used debrox it got better. She does not have any nasal congsetion or runny nose right now

## 2019-08-08 NOTE — ASSESSMENT
[FreeTextEntry1] : Patient complaining of right ear pain severe couple weeks ago that is not feeling to bed on examination massive very firm hard to wax on her right tympanic membrane. This was taken out with significant difficulty. Pull was the etiology of her symptoms. Audiogram confirm essentially normal hearing. She'll followup with us as needed.

## 2019-08-08 NOTE — CONSULT LETTER
[Dear  ___] : Dear  [unfilled], [Please see my note below.] : Please see my note below. [Consult Letter:] : I had the pleasure of evaluating your patient, [unfilled]. [Consult Closing:] : Thank you very much for allowing me to participate in the care of this patient.  If you have any questions, please do not hesitate to contact me. [Sincerely,] : Sincerely, [FreeTextEntry3] : Kevin Nugent MD\par [facility]\par Otolaryngology and Facial Plastics\par [employer], [employer]\par

## 2019-10-01 ENCOUNTER — RESULT REVIEW (OUTPATIENT)
Age: 49
End: 2019-10-01

## 2019-12-05 ENCOUNTER — APPOINTMENT (OUTPATIENT)
Dept: ENDOCRINOLOGY | Facility: CLINIC | Age: 49
End: 2019-12-05
Payer: COMMERCIAL

## 2019-12-05 VITALS
OXYGEN SATURATION: 98 % | DIASTOLIC BLOOD PRESSURE: 82 MMHG | HEIGHT: 66 IN | WEIGHT: 216 LBS | HEART RATE: 70 BPM | SYSTOLIC BLOOD PRESSURE: 124 MMHG | BODY MASS INDEX: 34.72 KG/M2

## 2019-12-05 DIAGNOSIS — N95.1 MENOPAUSAL AND FEMALE CLIMACTERIC STATES: ICD-10-CM

## 2019-12-05 PROCEDURE — 99214 OFFICE O/P EST MOD 30 MIN: CPT

## 2019-12-05 NOTE — PHYSICAL EXAM
[Alert] : alert [No Acute Distress] : no acute distress [Supple] : the neck was supple [Thyroid Not Enlarged] : the thyroid was not enlarged [No Thyroid Nodules] : there were no palpable thyroid nodules [No Respiratory Distress] : no respiratory distress [Normal Rate and Effort] : normal respiratory rhythm and effort [Clear to Auscultation] : lungs were clear to auscultation bilaterally [Normal Rate] : heart rate was normal  [Normal S1, S2] : normal S1 and S2 [Regular Rhythm] : with a regular rhythm [Normal Bowel Sounds] : normal bowel sounds [Not Tender] : non-tender [Soft] : abdomen soft [Not Distended] : not distended

## 2019-12-06 LAB
ALBUMIN SERPL ELPH-MCNC: 4.4 G/DL
ALP BLD-CCNC: 46 U/L
ALT SERPL-CCNC: 11 U/L
ANION GAP SERPL CALC-SCNC: 11 MMOL/L
AST SERPL-CCNC: 17 U/L
BILIRUB SERPL-MCNC: 0.6 MG/DL
BUN SERPL-MCNC: 20 MG/DL
CALCIUM SERPL-MCNC: 9.7 MG/DL
CHLORIDE SERPL-SCNC: 105 MMOL/L
CHOLEST SERPL-MCNC: 172 MG/DL
CHOLEST/HDLC SERPL: 2.1 RATIO
CO2 SERPL-SCNC: 27 MMOL/L
CREAT SERPL-MCNC: 0.94 MG/DL
ESTIMATED AVERAGE GLUCOSE: 77 MG/DL
GLUCOSE SERPL-MCNC: 88 MG/DL
HBA1C MFR BLD HPLC: 4.3 %
HDLC SERPL-MCNC: 82 MG/DL
LDLC SERPL CALC-MCNC: 80 MG/DL
POTASSIUM SERPL-SCNC: 4 MMOL/L
PROT SERPL-MCNC: 7.4 G/DL
SODIUM SERPL-SCNC: 143 MMOL/L
TRIGL SERPL-MCNC: 51 MG/DL
TSH SERPL-ACNC: 0.52 UIU/ML

## 2019-12-16 LAB
ESTROGEN SERPL-MCNC: 138 PG/ML
FSH: 79 MIU/ML

## 2019-12-16 NOTE — HISTORY OF PRESENT ILLNESS
[FreeTextEntry1] : 50 yo female with R knee bakers cyst, chronic LBP due to an injury, fibroids s/p multiple myomectomies and partial thyroidectomy in 2002 for a cold nodule which turned out to benign. She has been on synthroid 100mcg po daily since 2002. She forgets to take her synthroid some mornings.\par \par Her menses has been off and on, can skip a month. When it comes she bleeds heavily. Recently she has been having hot flashes. She has been contemplating adoption. She has been healing from her marriage separation. They live in the same house but are not together. She last went home to [facility] in 2018. She has a group of friends here and her sister and nieces are near by in [place].

## 2019-12-16 NOTE — ASSESSMENT
[FreeTextEntry1] : 48 yo female with hx of fibroids and cold thyroid nodule s/p partial thyroidectomy\par 1) Hypothyroidism/thyroid nodules: check TFTs today, adjust Synthroid as needed.\par -Yearly thyroid US at [address] Has one nodule seen on US but unable to palpate it on exam.\par 2) Perimenopause: check FSH/estrogen\par 3) Obesity: Screen for diabetes - CMP, HbA1c, lipids.\par return in 1 year

## 2019-12-16 NOTE — ADDENDUM
[FreeTextEntry1] : 12/6 305PM: left her a vm that her levels were good for her cholesterol and thyroid. No diabetes. I would refill her Synthroid at the current dose.\par 12/16 415PM: left her a vm that her labs indicate that she is undergoing menopause.

## 2020-01-14 ENCOUNTER — APPOINTMENT (OUTPATIENT)
Dept: INTERNAL MEDICINE | Facility: CLINIC | Age: 50
End: 2020-01-14

## 2020-02-21 ENCOUNTER — TRANSCRIPTION ENCOUNTER (OUTPATIENT)
Age: 50
End: 2020-02-21

## 2020-02-24 ENCOUNTER — NON-APPOINTMENT (OUTPATIENT)
Age: 50
End: 2020-02-24

## 2020-02-24 ENCOUNTER — APPOINTMENT (OUTPATIENT)
Dept: INTERNAL MEDICINE | Facility: CLINIC | Age: 50
End: 2020-02-24
Payer: COMMERCIAL

## 2020-02-24 VITALS
TEMPERATURE: 97.7 F | BODY MASS INDEX: 35.68 KG/M2 | HEIGHT: 66 IN | HEART RATE: 92 BPM | DIASTOLIC BLOOD PRESSURE: 87 MMHG | OXYGEN SATURATION: 98 % | SYSTOLIC BLOOD PRESSURE: 123 MMHG | WEIGHT: 222 LBS

## 2020-02-24 VITALS — SYSTOLIC BLOOD PRESSURE: 118 MMHG | DIASTOLIC BLOOD PRESSURE: 80 MMHG

## 2020-02-24 DIAGNOSIS — Z12.11 ENCOUNTER FOR SCREENING FOR MALIGNANT NEOPLASM OF COLON: ICD-10-CM

## 2020-02-24 DIAGNOSIS — M71.20 SYNOVIAL CYST OF POPLITEAL SPACE [BAKER], UNSPECIFIED KNEE: ICD-10-CM

## 2020-02-24 DIAGNOSIS — R07.89 OTHER CHEST PAIN: ICD-10-CM

## 2020-02-24 DIAGNOSIS — Z87.898 PERSONAL HISTORY OF OTHER SPECIFIED CONDITIONS: ICD-10-CM

## 2020-02-24 PROCEDURE — 36415 COLL VENOUS BLD VENIPUNCTURE: CPT

## 2020-02-24 PROCEDURE — 99386 PREV VISIT NEW AGE 40-64: CPT | Mod: 25

## 2020-02-24 PROCEDURE — 93000 ELECTROCARDIOGRAM COMPLETE: CPT

## 2020-02-24 RX ORDER — LEVOTHYROXINE SODIUM 112 UG/1
112 TABLET ORAL
Refills: 0 | Status: COMPLETED | COMMUNITY
End: 2020-02-24

## 2020-02-24 NOTE — PAST MEDICAL HISTORY
[Menarche Age ____] : age at menarche was [unfilled] [Excessive Bleeding] : there was excessive bleeding [Definite ___ (Date)] : the last menstrual period was [unfilled] [Abortions ___] : Abortions:[unfilled]

## 2020-02-24 NOTE — HEALTH RISK ASSESSMENT
[Good] : ~his/her~ current health as good [Patient reported mammogram was normal] : Patient reported mammogram was normal [Patient reported PAP Smear was normal] : Patient reported PAP Smear was normal [Patient reported colonoscopy was normal] : Patient reported colonoscopy was normal [I will adhere to the patient's wishes as expressed in the advance directive except as noted below.] : I will adhere to the patient's wishes as expressed in the advance directive except as noted below [FreeTextEntry1] : weight gain 12 pounds [MammogramDate] : 10/19 [PapSmearDate] : 10/19 [AdvancecareDate] : 02/20 [FreeTextEntry4] : [place]

## 2020-02-24 NOTE — PHYSICAL EXAM
[Well Developed] : well developed [Well Nourished] : well nourished [No Acute Distress] : no acute distress [Well-Appearing] : well-appearing [Normal Sclera/Conjunctiva] : normal sclera/conjunctiva [PERRL] : pupils equal round and reactive to light [Normal Outer Ear/Nose] : the outer ears and nose were normal in appearance [EOMI] : extraocular movements intact [Normal Oropharynx] : the oropharynx was normal [No JVD] : no jugular venous distention [No Lymphadenopathy] : no lymphadenopathy [No Respiratory Distress] : no respiratory distress  [Supple] : supple [Thyroid Normal, No Nodules] : the thyroid was normal and there were no nodules present [No Accessory Muscle Use] : no accessory muscle use [Clear to Auscultation] : lungs were clear to auscultation bilaterally [Regular Rhythm] : with a regular rhythm [Normal Rate] : normal rate  [No Murmur] : no murmur heard [Normal S1, S2] : normal S1 and S2 [No Carotid Bruits] : no carotid bruits [No Abdominal Bruit] : a ~M bruit was not heard ~T in the abdomen [No Varicosities] : no varicosities [Pedal Pulses Present] : the pedal pulses are present [No Edema] : there was no peripheral edema [No Palpable Aorta] : no palpable aorta [No Extremity Clubbing/Cyanosis] : no extremity clubbing/cyanosis [Declined Breast Exam] : declined breast exam  [Non Tender] : non-tender [Soft] : abdomen soft [Non-distended] : non-distended [No Masses] : no abdominal mass palpated [Normal Bowel Sounds] : normal bowel sounds [No HSM] : no HSM [Normal Posterior Cervical Nodes] : no posterior cervical lymphadenopathy [Normal Anterior Cervical Nodes] : no anterior cervical lymphadenopathy [No Spinal Tenderness] : no spinal tenderness [No CVA Tenderness] : no CVA  tenderness [Grossly Normal Strength/Tone] : grossly normal strength/tone [No Joint Swelling] : no joint swelling [No Rash] : no rash [Coordination Grossly Intact] : coordination grossly intact [No Focal Deficits] : no focal deficits [Normal Gait] : normal gait [Deep Tendon Reflexes (DTR)] : deep tendon reflexes were 2+ and symmetric [Normal Affect] : the affect was normal [Normal Insight/Judgement] : insight and judgment were intact

## 2020-02-24 NOTE — REVIEW OF SYSTEMS
[Fever] : no fever [Discharge] : no discharge [Vision Problems] : no vision problems [Earache] : no earache [Chest Pain] : no chest pain [Palpitations] : no palpitations [Leg Claudication] : no leg claudication [Wheezing] : no wheezing [Shortness Of Breath] : no shortness of breath [Lower Ext Edema] : no lower extremity edema [Poor Libido] : libido not poor [Dyspnea on Exertion] : no dyspnea on exertion [Cough] : no cough [Dysmenorrhea] : no dysmenorrhea [Joint Stiffness] : no joint stiffness [Muscle Weakness] : no muscle weakness [Back Pain] : no back pain [Mole Changes] : no mole changes [Itching] : no itching [Skin Rash] : no skin rash

## 2020-02-24 NOTE — HISTORY OF PRESENT ILLNESS
[FreeTextEntry1] : Twenty minutes late\par Accommodated\par Establish care [de-identified] : 50 yo uterine fibroids S/P multiple myomectomies, partial thyroidiectomy for COLD NODULE BENIGN\par Hypothyroid on SYNTHR since 2002. 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

## 2020-02-26 LAB
HBV SURFACE AB SER QL: REACTIVE
MEV IGG FLD QL IA: 118 AU/ML
MEV IGG+IGM SER-IMP: POSITIVE
MUV AB SER-ACNC: POSITIVE
MUV IGG SER QL IA: 82.3 AU/ML
RUBV IGG FLD-ACNC: 13.1 INDEX
RUBV IGG SER-IMP: POSITIVE
VZV AB TITR SER: NEGATIVE
VZV IGG SER IF-ACNC: 33.2 INDEX

## 2020-03-26 ENCOUNTER — APPOINTMENT (OUTPATIENT)
Dept: INTERNAL MEDICINE | Facility: CLINIC | Age: 50
End: 2020-03-26

## 2020-04-03 ENCOUNTER — RESULT REVIEW (OUTPATIENT)
Age: 50
End: 2020-04-03

## 2020-04-25 ENCOUNTER — MESSAGE (OUTPATIENT)
Age: 50
End: 2020-04-25

## 2020-05-01 LAB
SARS-COV-2 IGG SERPL IA-ACNC: 0 INDEX
SARS-COV-2 IGG SERPL QL IA: NEGATIVE

## 2020-05-20 ENCOUNTER — TRANSCRIPTION ENCOUNTER (OUTPATIENT)
Age: 50
End: 2020-05-20

## 2020-05-20 ENCOUNTER — APPOINTMENT (OUTPATIENT)
Dept: INTERNAL MEDICINE | Facility: CLINIC | Age: 50
End: 2020-05-20
Payer: COMMERCIAL

## 2020-05-20 ENCOUNTER — OUTPATIENT (OUTPATIENT)
Dept: OUTPATIENT SERVICES | Facility: HOSPITAL | Age: 50
LOS: 1 days | End: 2020-05-20
Payer: COMMERCIAL

## 2020-05-20 VITALS — DIASTOLIC BLOOD PRESSURE: 72 MMHG | SYSTOLIC BLOOD PRESSURE: 146 MMHG | HEART RATE: 77 BPM | RESPIRATION RATE: 12 BRPM

## 2020-05-20 DIAGNOSIS — Z11.59 ENCOUNTER FOR SCREENING FOR OTHER VIRAL DISEASES: ICD-10-CM

## 2020-05-20 DIAGNOSIS — H61.23 IMPACTED CERUMEN, BILATERAL: ICD-10-CM

## 2020-05-20 DIAGNOSIS — R94.31 ABNORMAL ELECTROCARDIOGRAM [ECG] [EKG]: ICD-10-CM

## 2020-05-20 DIAGNOSIS — Z01.818 ENCOUNTER FOR OTHER PREPROCEDURAL EXAMINATION: ICD-10-CM

## 2020-05-20 LAB — SARS-COV-2 RNA SPEC QL NAA+PROBE: SIGNIFICANT CHANGE UP

## 2020-05-20 PROCEDURE — U0003: CPT

## 2020-05-20 PROCEDURE — 99214 OFFICE O/P EST MOD 30 MIN: CPT

## 2020-05-20 NOTE — ASSESSMENT
[High Risk Surgery - Intraperitoneal, Intrathoracic or Supringuinal Vascular Procedures] : High Risk Surgery - Intraperitoneal, Intrathoracic or Supringuinal Vascular Procedures - No (0) [Congestive Heart Failure] : Congestive Heart Failure - No (0) [Ischemic Heart Disease] : Ischemic Heart Disease - No (0) [Creatinine >= 2mg/dL (1 Point)] : Creatinine >= 2mg/dL - No (0) [Prior Cerebrovascular Accident or TIA] : Prior Cerebrovascular Accident or TIA - No (0) [Insulin-dependent Diabetic (1 Point)] : Insulin-dependent Diabetic - No (0) [Patient Optimized for Surgery] : Patient optimized for surgery [0] : 0 , RCRI Class: I, Risk of Post-Op Cardiac Complications: 3.9%, 95% CI for Risk Estimate: 2.8% - 5.4%

## 2020-05-20 NOTE — PHYSICAL EXAM
[No Acute Distress] : no acute distress [Well Nourished] : well nourished [Well Developed] : well developed [Well-Appearing] : well-appearing [No Respiratory Distress] : no respiratory distress  [Normal Voice/Communication] : normal voice/communication [No Accessory Muscle Use] : no accessory muscle use

## 2020-05-20 NOTE — HISTORY OF PRESENT ILLNESS
[Home] : at home, [unfilled] , at the time of the visit. [Other Location: e.g. Home (Enter Location, City,State)___] : at [unfilled] [Verbal consent obtained from patient] : the patient, [unfilled] [Aortic Stenosis] : no aortic stenosis [Atrial Fibrillation] : no atrial fibrillation [Recent Myocardial Infarction] : no recent myocardial infarction [Coronary Artery Disease] : no coronary artery disease [Implantable Device/Pacemaker] : no implantable device/pacemaker [Asthma] : no asthma [COPD] : no COPD [Sleep Apnea] : no sleep apnea [Smoker] : not a smoker [(Patient denies any chest pain, claudication, dyspnea on exertion, orthopnea, palpitations or syncope)] : Patient denies any chest pain, claudication, dyspnea on exertion, orthopnea, palpitations or syncope [FreeTextEntry3] : Dr. Socorro Meza [FreeTextEntry1] : D&C [FreeTextEntry4] : 49 yo [place] woman uterine fibroids S/P myomectomies, benign cold thyroid nodule\par ECG suggests anterior ischemia but stress ECHO [id number]  11 METS 139/87--->152/80 no ischemia\par \par Obesity, hypothyroidism\par Getting PST tomorrow\par \par COVID exposure via critical care setting wearing N95\par No anosmia, no diarrhea, no fever, cough nor SOB\par [employer]:   working from home\par Nose swab today\par COVID Ab 7 May 2020 [FreeTextEntry2] : [id number]

## 2020-05-21 ENCOUNTER — OUTPATIENT (OUTPATIENT)
Dept: OUTPATIENT SERVICES | Facility: HOSPITAL | Age: 50
LOS: 1 days | End: 2020-05-21
Payer: COMMERCIAL

## 2020-05-21 ENCOUNTER — RESULT REVIEW (OUTPATIENT)
Age: 50
End: 2020-05-21

## 2020-05-21 VITALS — TEMPERATURE: 97 F

## 2020-05-21 VITALS
TEMPERATURE: 97 F | RESPIRATION RATE: 16 BRPM | HEART RATE: 64 BPM | OXYGEN SATURATION: 100 % | DIASTOLIC BLOOD PRESSURE: 75 MMHG | SYSTOLIC BLOOD PRESSURE: 146 MMHG

## 2020-05-21 DIAGNOSIS — N84.0 POLYP OF CORPUS UTERI: ICD-10-CM

## 2020-05-21 LAB
HCT VFR BLD CALC: 42 % — SIGNIFICANT CHANGE UP (ref 34.5–45)
HGB BLD-MCNC: 14 G/DL — SIGNIFICANT CHANGE UP (ref 11.5–15.5)
MCHC RBC-ENTMCNC: 28.1 PG — SIGNIFICANT CHANGE UP (ref 27–34)
MCHC RBC-ENTMCNC: 33.3 GM/DL — SIGNIFICANT CHANGE UP (ref 32–36)
MCV RBC AUTO: 84.2 FL — SIGNIFICANT CHANGE UP (ref 80–100)
NRBC # BLD: 0 /100 WBCS — SIGNIFICANT CHANGE UP (ref 0–0)
PLATELET # BLD AUTO: SIGNIFICANT CHANGE UP K/UL (ref 150–400)
RBC # BLD: 4.99 M/UL — SIGNIFICANT CHANGE UP (ref 3.8–5.2)
RBC # FLD: 15.3 % — HIGH (ref 10.3–14.5)
WBC # BLD: 2.43 K/UL — LOW (ref 3.8–10.5)
WBC # FLD AUTO: 2.43 K/UL — LOW (ref 3.8–10.5)

## 2020-05-21 PROCEDURE — 85027 COMPLETE CBC AUTOMATED: CPT

## 2020-05-21 PROCEDURE — 58558 HYSTEROSCOPY BIOPSY: CPT

## 2020-05-21 PROCEDURE — 88305 TISSUE EXAM BY PATHOLOGIST: CPT | Mod: 26

## 2020-05-21 PROCEDURE — 88305 TISSUE EXAM BY PATHOLOGIST: CPT

## 2020-05-21 RX ORDER — KETOROLAC TROMETHAMINE 30 MG/ML
30 SYRINGE (ML) INJECTION ONCE
Refills: 0 | Status: DISCONTINUED | OUTPATIENT
Start: 2020-05-21 | End: 2020-05-21

## 2020-05-21 RX ORDER — SODIUM CHLORIDE 9 MG/ML
3 INJECTION INTRAMUSCULAR; INTRAVENOUS; SUBCUTANEOUS EVERY 8 HOURS
Refills: 0 | Status: DISCONTINUED | OUTPATIENT
Start: 2020-05-21 | End: 2020-06-05

## 2020-05-21 RX ORDER — LIDOCAINE HCL 20 MG/ML
0.2 VIAL (ML) INJECTION ONCE
Refills: 0 | Status: DISCONTINUED | OUTPATIENT
Start: 2020-05-21 | End: 2020-06-05

## 2020-05-21 RX ORDER — ACETAMINOPHEN 500 MG
1000 TABLET ORAL ONCE
Refills: 0 | Status: COMPLETED | OUTPATIENT
Start: 2020-05-21 | End: 2020-05-21

## 2020-05-21 RX ADMIN — Medication 1000 MILLIGRAM(S): at 07:23

## 2020-05-21 RX ADMIN — Medication 30 MILLIGRAM(S): at 10:46

## 2020-05-21 NOTE — ASU DISCHARGE PLAN (ADULT/PEDIATRIC) - CARE PROVIDER_API CALL
Gloria Maya  OBS-GYN - GENERAL  [address] [place] [address]  Phone: [phone]  Fax: [phone]  Follow Up Time: 2 weeks

## 2020-05-21 NOTE — H&P PST ADULT - NSICDXPROBLEM_GEN_ALL_CORE_FT
PROBLEM DIAGNOSES  Problem: Polyp of corpus uteri  Assessment and Plan: EUA, D&C, operative hysteroscopy removal of endometrial polyps

## 2020-05-21 NOTE — ASU DISCHARGE PLAN (ADULT/PEDIATRIC) - CALL YOUR DOCTOR IF YOU HAVE ANY OF THE FOLLOWING:
Excessive diarrhea/Pain not relieved by Medications/Increased irritability or sluggishness/Numbness, tingling, color or temperature change to extremity/Inability to tolerate liquids or foods/Nausea and vomiting that does not stop/Unable to urinate/Bleeding that does not stop/Swelling that gets worse/Fever greater than (need to indicate Fahrenheit or Celsius)/Wound/Surgical Site with redness, or foul smelling discharge or pus

## 2020-05-21 NOTE — H&P PST ADULT - ENDOCRINE
[Medication Risks Reviewed] : Medication risks reviewed [de-identified] : The patient has symptoms of ongoing left leg pain. She's a little better following the epidural steroid injection would like to schedule another injection. She found the injection more helpful than the medications which were prescribed previously including meloxicam gabapentin. She would prefer not to take any more medications. We'll try and schedule an epidural steroid injection at her earliest convenience. She understands that surgical decompression laminectomy is an option for her but she would like to avoid that.\par \par I spent 25 minutes of face-to-face time with the patient of which over 50% was spent in counseling the discussion above\par \par The patient was educated regarding their condition, treatment options as well as prescribed course of treatment. \par Risks and benefits as well as alternatives to the proposed treatment were also provided to the patient \par They were given the opportunity to have all their questions answered to their satisfaction.\par \par Vital signs were reviewed with the patient and the patient was instructed to followup with their primary care provider for further management.\par \par Healthy lifestyle recommendations were also made including a tobacco free lifestyle, proper diet, and weight control. negative

## 2020-05-21 NOTE — H&P PST ADULT - HISTORY OF PRESENT ILLNESS
51 yo F with h/o hypothyroidism, uterine leiomyoma c/o irregular periods x one year. Pt had GYN evaluation- pelvic sonogram revealed endometrial polyps- scheduled for EUA, D&C, operative hysteroscopy removal of endometrial polyp on 5/21/20.    **Covid 19 PCR negative on 5/20/2020

## 2020-05-21 NOTE — H&P PST ADULT - NSICDXPASTMEDICALHX_GEN_ALL_CORE_FT
PAST MEDICAL HISTORY:  Chronic back pain on valium PRN for spasm    Cold thyroid nodule     Fibroids     Hypothyroidism     Obese

## 2020-05-21 NOTE — BRIEF OPERATIVE NOTE - OPERATION/FINDINGS
Exam under anesthesia revealed approximately 12wk sized, anteverted uterus. The uterine cavity was visualized via hysteroscopy, the ostia were located and noted to be within normal limits bilaterally. Slight polypoid tissue was noted at the region of the left ostia. Dilation and curettage performed with endometrial tissue obtained.

## 2020-05-21 NOTE — ASU PATIENT PROFILE, ADULT - AS SC BRADEN FRICTION
<<-----Click on this checkbox to enter Procedure Herniorrhaphy, ventral, with mesh  02/02/2018  medium  covidien patch  Active  ADEOLVEI (3) no apparent problem

## 2020-05-21 NOTE — ASU DISCHARGE PLAN (ADULT/PEDIATRIC) - NURSING INSTRUCTIONS
next dose Tylenol @ 130pm today if needed then may take every 6 hours as needed.  call MD if unable to urinate [id number] today

## 2020-05-21 NOTE — BRIEF OPERATIVE NOTE - NSICDXBRIEFPROCEDURE_GEN_ALL_CORE_FT
PROCEDURES:  Polypectomy, uterine 21-May-2020 10:30:29  Lottie Hair  Hysteroscopy with dilation and curettage of uterus 21-May-2020 10:30:22  Lottie Hair

## 2020-05-21 NOTE — H&P PST ADULT - NSICDXPASTSURGICALHX_GEN_ALL_CORE_FT
PAST SURGICAL HISTORY:  H/O arthroscopy of shoulder right-2012    H/O myomectomy 2006, 2012    History of subtotal thyroidectomy 2005

## 2020-05-21 NOTE — ASU DISCHARGE PLAN (ADULT/PEDIATRIC) - ASU DC SPECIAL INSTRUCTIONSFT
Take ibuprofen and acetaminophen as needed, for pain control. Regular diet as tolerated, regular activity as tolerated, no heavy lifting for first two weeks.      Nothing per vagina: no intercourse, tampons or douching.      Call your provider if you experience fevers, chills, worsening abdominal pain, inability to urinate or worsening vaginal bleeding.    Follow up with Dr. Maya in 2 weeks.

## 2020-05-21 NOTE — H&P PST ADULT - NSICDXFAMILYHX_GEN_ALL_CORE_FT
FAMILY HISTORY:  Father  Still living? No  Family history of congestive heart failure, Age at diagnosis: Age Unknown  Family history of hypertension, Age at diagnosis: Age Unknown    Sibling  Still living? Yes, Estimated age: Age Unknown  Family history of diabetic complications, Age at diagnosis: Age Unknown  Family history of diabetic complications, Age at diagnosis: Age Unknown

## 2020-05-21 NOTE — H&P PST ADULT - ACTIVITY
able to climb 3 flight stairs, able to carry 1 basket of clothes  upstairs, clean the house mop floor

## 2020-05-27 LAB — SURGICAL PATHOLOGY STUDY: SIGNIFICANT CHANGE UP

## 2020-07-28 NOTE — ASU PATIENT PROFILE, ADULT - AS SC BRADEN MOISTURE
Recommend patient to go to urgent care due to pain and swelling of L big toe. Recommend XR.    (4) rarely moist

## 2020-12-23 ENCOUNTER — RESULT REVIEW (OUTPATIENT)
Age: 50
End: 2020-12-23

## 2021-02-10 ENCOUNTER — ASOB RESULT (OUTPATIENT)
Age: 51
End: 2021-02-10

## 2021-02-10 ENCOUNTER — APPOINTMENT (OUTPATIENT)
Dept: OBGYN | Facility: CLINIC | Age: 51
End: 2021-02-10
Payer: COMMERCIAL

## 2021-02-10 ENCOUNTER — RESULT CHARGE (OUTPATIENT)
Age: 51
End: 2021-02-10

## 2021-02-10 LAB — HIV1+2 AB SPEC QL IA.RAPID: NONREACTIVE

## 2021-02-10 PROCEDURE — 76830 TRANSVAGINAL US NON-OB: CPT

## 2021-02-10 PROCEDURE — 77080 DXA BONE DENSITY AXIAL: CPT

## 2021-02-10 PROCEDURE — 99072 ADDL SUPL MATRL&STAF TM PHE: CPT

## 2021-02-10 PROCEDURE — 36415 COLL VENOUS BLD VENIPUNCTURE: CPT

## 2021-02-10 PROCEDURE — 81025 URINE PREGNANCY TEST: CPT

## 2021-02-11 LAB
HBV SURFACE AG SER QL: NONREACTIVE
HCG UR QL: NEGATIVE
HCV RNA SERPL NAA DL=5-ACNC: NOT DETECTED IU/ML
HCV RNA SERPL NAA+PROBE-LOG IU: NOT DETECTED LOG10IU/ML
T PALLIDUM AB SER QL IA: NEGATIVE

## 2021-03-04 ENCOUNTER — APPOINTMENT (OUTPATIENT)
Dept: ENDOCRINOLOGY | Facility: CLINIC | Age: 51
End: 2021-03-04
Payer: COMMERCIAL

## 2021-03-04 VITALS
TEMPERATURE: 97.2 F | BODY MASS INDEX: 36.96 KG/M2 | OXYGEN SATURATION: 98 % | HEART RATE: 70 BPM | DIASTOLIC BLOOD PRESSURE: 90 MMHG | WEIGHT: 229 LBS | SYSTOLIC BLOOD PRESSURE: 132 MMHG

## 2021-03-04 DIAGNOSIS — E04.1 NONTOXIC SINGLE THYROID NODULE: ICD-10-CM

## 2021-03-04 PROCEDURE — 99072 ADDL SUPL MATRL&STAF TM PHE: CPT

## 2021-03-04 PROCEDURE — 99213 OFFICE O/P EST LOW 20 MIN: CPT

## 2021-03-04 RX ORDER — MULTIVITAMIN
TABLET ORAL DAILY
Qty: 90 | Refills: 1 | Status: ACTIVE | COMMUNITY
Start: 2021-03-04

## 2021-03-11 ENCOUNTER — ASOB RESULT (OUTPATIENT)
Age: 51
End: 2021-03-11

## 2021-03-11 ENCOUNTER — APPOINTMENT (OUTPATIENT)
Dept: OBGYN | Facility: CLINIC | Age: 51
End: 2021-03-11
Payer: COMMERCIAL

## 2021-03-11 VITALS — DIASTOLIC BLOOD PRESSURE: 80 MMHG | SYSTOLIC BLOOD PRESSURE: 140 MMHG

## 2021-03-11 DIAGNOSIS — N92.0 EXCESSIVE AND FREQUENT MENSTRUATION WITH REGULAR CYCLE: ICD-10-CM

## 2021-03-11 LAB — HCG UR QL: NEGATIVE

## 2021-03-11 PROCEDURE — 76856 US EXAM PELVIC COMPLETE: CPT

## 2021-03-11 PROCEDURE — 99072 ADDL SUPL MATRL&STAF TM PHE: CPT

## 2021-03-11 PROCEDURE — 99212 OFFICE O/P EST SF 10 MIN: CPT | Mod: 25

## 2021-03-11 PROCEDURE — 81025 URINE PREGNANCY TEST: CPT

## 2021-03-12 ENCOUNTER — LABORATORY RESULT (OUTPATIENT)
Age: 51
End: 2021-03-12

## 2021-03-15 LAB
ALBUMIN SERPL ELPH-MCNC: 4.3 G/DL
ALP BLD-CCNC: 65 U/L
ALT SERPL-CCNC: 13 U/L
ANION GAP SERPL CALC-SCNC: 10 MMOL/L
AST SERPL-CCNC: 21 U/L
BILIRUB SERPL-MCNC: 0.3 MG/DL
BUN SERPL-MCNC: 17 MG/DL
CALCIUM SERPL-MCNC: 9.5 MG/DL
CHLORIDE SERPL-SCNC: 107 MMOL/L
CO2 SERPL-SCNC: 26 MMOL/L
CREAT SERPL-MCNC: 0.95 MG/DL
ESTIMATED AVERAGE GLUCOSE: 80 MG/DL
GLUCOSE SERPL-MCNC: 110 MG/DL
HBA1C MFR BLD HPLC: 4.4 %
POTASSIUM SERPL-SCNC: 3.9 MMOL/L
PROT SERPL-MCNC: 7.2 G/DL
SODIUM SERPL-SCNC: 143 MMOL/L
TSH SERPL-ACNC: 0.31 UIU/ML

## 2021-03-15 NOTE — ADDENDUM
[FreeTextEntry1] : 3/15: asked MOA to call pt to let her know that her labs are normal. BMP glucose was 110 but she was not fasting.

## 2021-03-15 NOTE — HISTORY OF PRESENT ILLNESS
[FreeTextEntry1] : 51 yo female with R knee bakers cyst, chronic LBP due to an injury, fibroids s/p multiple myomectomies and partial thyroidectomy in 2002 for a cold nodule which turned out to benign. She has been on Synthroid 100mcg po daily since 2002. \par Last May 2020 she had a D&C with endometrial bx which was normal. LMP was 11/20 and was heavy. Prior to that she had one in October. She has been having hot flushes. \par \par She is still  from her . They still cohabit but are not together. She has began to look for a child to adopt. She had thought of adopting \par

## 2021-03-15 NOTE — PHYSICAL EXAM
[Alert] : alert [No Acute Distress] : no acute distress [No Neck Mass] : no neck mass was observed [Thyroid Not Enlarged] : the thyroid was not enlarged [No Respiratory Distress] : no respiratory distress [Clear to Auscultation] : lungs were clear to auscultation bilaterally [Normal to Percussion] : lungs were normal to percussion [Normal S1, S2] : normal S1 and S2 [Normal Rate] : heart rate was normal [Regular Rhythm] : with a regular rhythm [Normal Bowel Sounds] : normal bowel sounds [Not Tender] : non-tender [Not Distended] : not distended [Soft] : abdomen soft [de-identified] : unable to palpate nodules

## 2021-03-15 NOTE — ASSESSMENT
[FreeTextEntry1] : 51 yo female with hx of fibroids and cold thyroid nodule s/p partial thyroidectomy\par 1) Hypothyroidism/thyroid nodules: check TFTs today, adjust Synthroid as needed.\par -Pt overdue for yearly thyroid US at [address] Has one nodule seen on US but unable to palpate it on exam.\par 2) Obesity: Screen for diabetes - CMP and HbA1c.\par Refer to weight mgt clinic. \par return in 1 year

## 2021-03-24 ENCOUNTER — LABORATORY RESULT (OUTPATIENT)
Age: 51
End: 2021-03-24

## 2021-03-24 ENCOUNTER — APPOINTMENT (OUTPATIENT)
Dept: INTERNAL MEDICINE | Facility: CLINIC | Age: 51
End: 2021-03-24
Payer: COMMERCIAL

## 2021-03-24 VITALS
SYSTOLIC BLOOD PRESSURE: 150 MMHG | BODY MASS INDEX: 36.16 KG/M2 | WEIGHT: 225 LBS | TEMPERATURE: 97.7 F | OXYGEN SATURATION: 96 % | HEART RATE: 71 BPM | HEIGHT: 66 IN | DIASTOLIC BLOOD PRESSURE: 88 MMHG

## 2021-03-24 VITALS — DIASTOLIC BLOOD PRESSURE: 90 MMHG | SYSTOLIC BLOOD PRESSURE: 146 MMHG

## 2021-03-24 DIAGNOSIS — Z23 ENCOUNTER FOR IMMUNIZATION: ICD-10-CM

## 2021-03-24 DIAGNOSIS — E55.9 VITAMIN D DEFICIENCY, UNSPECIFIED: ICD-10-CM

## 2021-03-24 PROCEDURE — 99396 PREV VISIT EST AGE 40-64: CPT | Mod: 25

## 2021-03-24 PROCEDURE — 90471 IMMUNIZATION ADMIN: CPT

## 2021-03-24 PROCEDURE — 90715 TDAP VACCINE 7 YRS/> IM: CPT

## 2021-03-24 PROCEDURE — 99072 ADDL SUPL MATRL&STAF TM PHE: CPT

## 2021-03-24 NOTE — HISTORY OF PRESENT ILLNESS
[FreeTextEntry1] : CPE [de-identified] : Twelve Minutes Late\par \par 49 yo Uterine fibroid stable size\par R Madrigal's cyst, injury chronic LBP\par endometrial biopsy for heavy menses May 2020 cleared and [facility] BX\par Thyroid nodules on Levothy since 2002\par Likely perimenopausal\par \par \par ExHusband\par \par DIET  more plant based skipping meals\par \par Exercise not yet\par \par ECG LAHB\par \par Works at the [employer]\par Discussed Lifestyle modification\par Missed 2/7 days of levothyroxin  TSH 0.31  March 2021 HbA1c 4.4%

## 2021-03-24 NOTE — PHYSICAL EXAM
[No Acute Distress] : no acute distress [Well Nourished] : well nourished [Well Developed] : well developed [Well-Appearing] : well-appearing [Normal Sclera/Conjunctiva] : normal sclera/conjunctiva [PERRL] : pupils equal round and reactive to light [EOMI] : extraocular movements intact [Normal Outer Ear/Nose] : the outer ears and nose were normal in appearance [Normal Oropharynx] : the oropharynx was normal [No JVD] : no jugular venous distention [No Lymphadenopathy] : no lymphadenopathy [Supple] : supple [Thyroid Normal, No Nodules] : the thyroid was normal and there were no nodules present [No Respiratory Distress] : no respiratory distress  [No Accessory Muscle Use] : no accessory muscle use [Clear to Auscultation] : lungs were clear to auscultation bilaterally [Normal Rate] : normal rate  [Regular Rhythm] : with a regular rhythm [Normal S1, S2] : normal S1 and S2 [No Murmur] : no murmur heard [No Carotid Bruits] : no carotid bruits [No Abdominal Bruit] : a ~M bruit was not heard ~T in the abdomen [No Varicosities] : no varicosities [Pedal Pulses Present] : the pedal pulses are present [No Edema] : there was no peripheral edema [No Palpable Aorta] : no palpable aorta [No Extremity Clubbing/Cyanosis] : no extremity clubbing/cyanosis [Declined Breast Exam] : declined breast exam  [Soft] : abdomen soft [Non Tender] : non-tender [Non-distended] : non-distended [No Masses] : no abdominal mass palpated [No HSM] : no HSM [Normal Bowel Sounds] : normal bowel sounds [Normal Posterior Cervical Nodes] : no posterior cervical lymphadenopathy [Normal Anterior Cervical Nodes] : no anterior cervical lymphadenopathy [No CVA Tenderness] : no CVA  tenderness [No Spinal Tenderness] : no spinal tenderness [No Joint Swelling] : no joint swelling [Grossly Normal Strength/Tone] : grossly normal strength/tone [No Rash] : no rash [Coordination Grossly Intact] : coordination grossly intact [No Focal Deficits] : no focal deficits [Normal Gait] : normal gait [Deep Tendon Reflexes (DTR)] : deep tendon reflexes were 2+ and symmetric [Normal Affect] : the affect was normal [Normal Insight/Judgement] : insight and judgment were intact

## 2021-03-25 LAB
CHOLEST SERPL-MCNC: 169 MG/DL
HDLC SERPL-MCNC: 82 MG/DL
LDLC SERPL CALC-MCNC: 75 MG/DL
NONHDLC SERPL-MCNC: 87 MG/DL
TRIGL SERPL-MCNC: 61 MG/DL

## 2021-04-16 ENCOUNTER — NON-APPOINTMENT (OUTPATIENT)
Age: 51
End: 2021-04-16

## 2021-05-03 NOTE — ED ADULT NURSE NOTE - CCCP TRG CHIEF CMPLNT
MEDICAL ONCOLOGY PROGRESS NOTE    Pt Name: Russell Tucker  MRN: [id number]  YOB: 1959  Date of evaluation: 4/29/2021    HISTORY OF PRESENT ILLNESS:      Diagnosis  · Colonic adenocarcinoma, [id number]  · [id number](liver), stage ROXANA  · IHC MMR- proficient  · K-maria luisa mutated  · N-MARIA LUISA/BRAF wild-type  · Iron deficiency anemia    Treatment summary  · 3/30/2020-right hemicolectomy at [facility]  · Anticipated Liver ablation to be followed by neoadjuvant/adjuvant versus palliative chemotherapy  · 06/10/2020-FOLFOX + Avastin  · 12/11/20- Right hepatectomy-Dr. Vinny Tee  · Anticipated Injectafer-poor oral iron tolerance    The patient is a pleasant 57years old male was found to have a right cecal mass consistent with colonic adenocarcinoma. He had locally advanced disease with several lymph nodes involved. In addition, the patient was also found to have suspicious liver lesions concerning for metastatic disease. He was seen by [facility] and offered a multimodality approach with liver ablation of the left liver lesion followed by neoadjuvant chemotherapy and partial right hepatectomy. He underwent microwave ablation of the left lobe liver lesion on 6/8/2020. He is status post completion of 11 biweekly cycles of FOLFOX/Avastin. He tolerated treatment with complaints of mild transient cold neuropathy. He had a right hepatectomy on 12/11/2020 that showed no residual disease. He is now on clinical/radiologic surveillance. His last CEA was normal in January 2021. He had MRI of the abdomen at [facility] in March 2021 that showed no evidence of recurrent disease in the liver or in the abdomen. He has gained a couple of pounds since last visit. Continues to complains of mild persistent sensorial neuropathy tip of his fingers. He has been physically active and walks 3 miles daily. He also had a surveillance colonoscopy in March 2021 that showed no polyps. .    Cancer history  Mr. Odessa Hargrove was first seen by me on 3/23/2020. He was referred for a new diagnosis of colonic adenocarcinoma involving the cecum. The patient reports that he had a wellbeing consult with his provider at the [facility]. He was found to have anemia and then recommended a colonoscopy. Of note, the patient has a family history of colon cancer. His mother is a patient of Dr. Jean Marie Mendoza he has been diagnosed with colon cancer in 2010. · 3/11/2020- colonoscopy revealed a large malignant appearing fungating mass lesion in the cecum. In addition, several other polyps. Biopsy of the mass consistent with moderate differentiated colonic adenocarcinoma. Polyps consistent with tubulovillous adenoma with no high-grade dysplasia. IHC MMR not proficient. K-maria luisa mutated, BRAF and NRAS wild type. MSI proficient  · 3/11/2020-CEA 5.5 (H)  · 3/18/2020-CT abdomen pelvis with contrast  Invasive cecal mass adhering to the right lateral abdominal wall muscles with adjacent lymphadenopathy. Mild partial obstruction of the terminal ileum. 2. Suspicious lesions in the right and left hepatic lobes measure up to 1.3 cm and likely represent metastatic disease. · 3/18/2020-Xr Chest Standard  No radiographic evidence of acute cardiopulmonary process. · 3/23/2020-he was first seen by me. Recommended completion of staging with CT chest.  Also recommended liver MRI for further clarification of liver lesion. S.  Recommend to proceed with a general surgery consultation tomorrow with Dr. Nancy Astudillo. Patient was informed that I favor surgical resection if feasible of the primary malignancy. · 3/30/2020- right hemicolectomy by Dr. Nancy Astudillo at [facility] consistent with invasive moderately differentiated colonic adenocarcinoma measuring 7.2 cm. Carcinoma directly invading the adjacent abdominal wall tissue. Focal lymphovascular space invasion identified. Focal perineural invasion identified. Surgical margins negative for evidence of malignancy.   6 out of 14 lymph nodes positive for metastatic adenocarcinoma. Final pathology staging [id number](liver, stage ROXANA)  · 4/20/2020-CT chest with contrast showed No convincing intrathoracic metastasis. Nonspecific 4 mm nodule of the inferior lingula and a 2 mm right upper lobe nodule can be followed on subsequent imaging in 6-12 months. Moderate coronary calcifications. Hypodense metastatic liver lesions. Small hiatal hernia. · 4/20/2020-Mri Abdomen W Wo Contrast There are about 5 liver lesions. The 2 largest appears similar compared to 3/18/2020, the others are too small to further characterize. Appearance is most concerning for metastatic disease. Enhancement of the right lateral peritoneum. This is favored to be postoperative as there is no nodularity, evidence of omental disease or lymphadenopathy. Recommend attention on follow-up. Cholecystectomy. · 4/22/2020-discussed with Dr. Edward Benítez at [place]. He will review imaging studies and give further recommendations regarding eligibility for resection of liver lesions. · 5/8/2020 CT Abdomen The two largest suspicious lesions measuring 1.2 and 1.3 cm in the  right and left hepatic lobes respectively are similar compared to the  3/18/2020 CT. Additionally, there are at least five subcentimeter lesions with similar signal characteristics, which are also highly suspicious for metastases. If complete characterization of the number and distribution of lesions is necessary, an MRI with Eovist could be acquired. · 5/19/2020- he was seen by the hepatobiliary service at [facility] with Dr. Edward Benítez:  patient adequate risk candidate for a multimodal approach, directed toward curative hepatectomy eventually. Endorsed by Hepatobiliary Conference, I recommended perc ablation of the L hemiliver to clear it, followed by systemic therapy in a neoadjuvant strategy.  Restaging imaging to confirm clearance of disease on the left and lack of progression to unresectability of the R hemiliver disease would then be followed by R hepatectomy. Limitations to this approach may be accessibility of the segment 4A/8 disease high in the hepatic dome and the possibility of heat sink-related recurrence s/p abation of the left-sided disease. · 5/21/2020- referral for Mediport placement and start FOLFOX in 2 weeks. Will add bevacizumab after 6 weeks of liver ablation. We will plan for 12 biweekly dose of FOLFOX. Bevacizumab will also be stopped 6 weeks prior to major procedure. · 6/8/2020- Microwave ablation of the left liver lesion was then performed for 5 minutes at [address] to achieve a 3.4 x 3.9 cm approximately spherical zone of ablation. · 6/10/2020- initiation of FOLFOX. · 7/20/2020-added Avastin. · 9/10/20 MRI abdomen: Left hepatic lobe segment II lesion demonstrates small T1 hyperintense blood products, status post microwave ablation on 6/8/2020. No definitive enhancement within the lesion. Focal internal thickening or scar present. Recommend attention on follow-up. Additional scattered subcentimeter foci throughout the liver decreased in size compared to MRI dated 4/20/2020 consistent with improving metastatic disease. Additional chronic findings as above. · 9/16/2020-discussed with plan with the patient and [facility]. Interval response to treatment. Plan to continue chemotherapy through 12 cycles with Avastin. · 12/11/20 Right hepatectomy-Dr. Tha Chase  · 12/11/20 Right hepatectomy pathology: Liver, right, resection: Focus of Fibrosis with calcifications and chronic inflammation (0.3 cm), see comment. Background hepatic parenchyma with minimal periportal fibrosis (trichrome stain), minimal lobular and portal inflammation, and no significant macrovesicular steatosis (<5%) Comments: The patient's history of colorectal cancer status adjuvant therapy is noted. The focus of fibrosis may reflect treatment effect. There is no evidence of viable tumor in the sampled specimen. · 12/14/20 Ct Abdomen Pelvis W Iv Contrast A Small bowel obstruction with transition point at the distal small bowel, just proximal to RIGHT upper quadrant ileocolonic anastomosis. Postoperative change of RIGHT hepatectomy with small amount of expected free fluid and intraperitoneal gas. Redemonstrated LEFT liver lesion. Small bilateral pleural effusions. · 12/16/20 SB-Vancleave: Study is limited due to retained contrast in the small bowel from prior attempt at small bowel follow-through on the floor. Small bowel remains dilated, measuring approximately 5.2 cm, which is consistent with partial or resolving small bowel obstruction. Final radiographs show contrast within the colon. · 1/4/2020-resolution of small bowel obstruction. · 1/7/21 CT chest: No finding to suggest intrathoracic neoplastic process or metastatic disease. The benign-appearing tiny nodule in the right upper lobe probably represent a noncalcified granuloma. A nodule in the lingular segment of the left upper lobe is not visualized in this study. Postsurgical changes of the liver. No evidence of focal complication. A trace right basal pleural effusion. This may be reactive to the previous abdominal surgery. · 3/4/21 MRI abd: Status post right hepatectomy and microwave ablation of a left liver lesion. No findings to suggest residual/recurrent disease. No new liver lesion is identified. Postsurgical changes related to right hemicolectomy. Microwave ablation zone in segment II of the left hepatic lobe measures approximately 21 mm in diameter, unchanged. No appreciable postcontrast enhancement or other findings to suggest local disease recurrence. No new liver lesion is identified. Spleen is mildly enlarged, measuring 13.8 cm in length. · 3/17/2021-1 year colonoscopy showed no evidence of polyps.       Past Medical History:    Past Medical History:   Diagnosis Date    Acid reflux     Cancer ([facility] [address]     adenocarcinoma of colon    GERD (gastroesophageal reflux disease)     PTSD (post-traumatic stress disorder)    Liver metastasis    Past Surgical History:    Past Surgical History:   Procedure Laterality Date    ABLATION OF DYSRHYTHMIC FOCUS      ablation of liver at [address]  2003    CHOLECYSTECTOMY  2013    COLONOSCOPY      when pt was in the 19's    COLONOSCOPY N/A 03/11/2020    COLONOSCOPY POLYPECTOMY SNARE/COLD BIOPSY: Dr. Carmita Harris colonoscopy: 6-12 months due to findings at colonoscopy today with Cecal mass lesion and large polyps.     COLONOSCOPY      COLONOSCOPY N/A 03/17/2021    Dr Augustin Norris, Post-operative changes w IC anastomosis & single visible staple, Mild Diverticuosis, Int Hemorrhoids Grade 1, 3 year recall    HEMICOLECTOMY Right 03/30/2020    LAPAROSCOPIC-ASSISTED RIGHT HEMICOLECTOMY performed by Lauro Latham MD at [address] [place] [address] N/A 06/03/2020    INSERTION OF VENOUS PORT with flouro performed by Lauro Latham MD at [place] [address] ENDOSCOPY N/A 03/11/2020    Dr. Norma Christina:   [facility]       Social History:    Social History     Socioeconomic History    Marital status:      Spouse name: Not on file    Number of children: Not on file    Years of education: Not on file    Highest education level: Not on file   Occupational History    Not on file   Social Needs    Financial resource strain: Not on file    Food insecurity     Worry: Not on file     Inability: Not on file   [employer] needs     Medical: Not on file     Non-medical: Not on file   Tobacco Use    Smoking status: Never Smoker    Smokeless tobacco: Never Used   Substance and Sexual Activity    Alcohol use: Yes     Comment: rare     Drug use: No    Sexual activity: Yes     Partners: Female   Lifestyle    Physical activity     Days per week: Not on file     Minutes per session: Not on file    Stress: Not on file   Relationships    Social connections     Talks on phone: Not on file Gets together: Not on file     Attends Buddhist service: Not on file     Active member of club or organization: Not on file     Attends meetings of clubs or organizations: Not on file     Relationship status: Not on file    Intimate partner violence     Fear of current or ex partner: Not on file     Emotionally abused: Not on file     Physically abused: Not on file     Forced sexual activity: Not on file   Other Topics Concern    Not on file   Social History Narrative    Not on file       Family History:   Family History   Problem Relation Age of Onset    Liver Cancer Mother     High Blood Pressure Mother     Colon Cancer Mother         x2    Cancer Father         Lung Cancer    Breast Cancer Sister     Cancer Maternal Grandfather         Lung Cancer    Cancer Paternal Grandfather         Stomach Cancer    Colon Polyps Neg Hx     Cystic Fibrosis Neg Hx     Liver Disease Neg Hx     Rectal Cancer Neg Hx        Current Hospital Medications:    No current facility-administered medications for this visit.    Current Outpatient Medications   Medication Sig Dispense Refill    promethazine (PHENERGAN) 12.5 MG tablet Take 1 tablet by mouth every 6 hours as needed for Nausea 60 tablet 5    ondansetron (ZOFRAN) 8 MG tablet Take 1 tablet by mouth every 8 hours as needed for Nausea or Vomiting 30 tablet 5    omeprazole (PRILOSEC) 20 MG delayed release capsule Take 20 mg by mouth daily      Cholecalciferol (VITAMIN D3) 50 MCG (2000 UT) CAPS Take by mouth daily      folic acid (FOLVITE) 1 MG tablet Take 1 mg by mouth daily      prazosin (MINIPRESS) 1 MG capsule Take 1 mg by mouth nightly For nightmares      buPROPion (WELLBUTRIN) 100 MG tablet Take 100 mg by mouth every morning For mood      topiramate (TOPAMAX) 100 MG tablet Take 100 mg by mouth daily For Seizures or Migraines      Sertraline HCl (ZOLOFT PO) Take by mouth daily      Zolpidem Tartrate (AMBIEN PO) Take by mouth nightly       No current facility-administered medications for this visit. Allergies: Allergies   Allergen Reactions    Oxycodone-Acetaminophen Nausea Only    Morphine Nausea And Vomiting     Hallucinations/Vomiting         Subjective   REVIEW OF SYSTEMS:   CONSTITUTIONAL: no fever, no night sweats, fatigue;   HEENT: no blurring of vision, no double vision, no hearing difficulty, no tinnitus, no ulceration, no dysplasia, no epistaxis;  LUNGS: no cough, no hemoptysis, no wheeze,  no shortness of breath;  CARDIOVASCULAR: no palpitation, no chest pain, no shortness of breath;  GI: no abdominal pain, no nausea, no vomiting,  no constipation;  ANNIE: no dysuria, no hematuria, no frequency or urgency, no nephrolithiasis;  MUSCULOSKELETAL: no joint pain, no swelling, no stiffness;  ENDOCRINE: no polyuria, no polydipsia, no cold or heat intolerance;  HEMATOLOGY: no easy bruising or bleeding, no history of clotting disorder;  DERMATOLOGY: no skin rash, no eczema, no pruritus;  PSYCHIATRY: no depression, no anxiety, no panic attacks, no suicidal ideation, no homicidal ideation;  NEUROLOGY: no syncope, no seizures, numbness or tingling of hands and feet, no paresis;    Objective   BP (!) 140/72   Pulse 85   Temp 96.9 °F (36.1 °C)   Ht 5' 7\" (1.702 m)   Wt 160 lb 9.6 oz (72.8 kg)   SpO2 93%   BMI 25.15 kg/m²       PHYSICAL EXAM:  CONSTITUTIONAL: Alert, appropriate, no acute distress  EYES: Non icteric, EOM intact, pupils equal round   ENT: Mucus membranes moist, no oral pharyngeal lesions, external inspection of ears and nose are normal  NECK: Supple, no masses. No palpable thyroid mass  CHEST/LUNGS: CTA bilaterally, normal respiratory effort   CARDIOVASCULAR: RRR, no murmurs. No lower extremity edema  ABDOMEN: Healing surgical scar,  active bowel sounds, no HSM. No palpable masses  EXTREMITIES: warm, full ROM in all 4 extremities, no focal weakness.   SKIN: warm, dry with no rashes or lesions  LYMPH: No cervical, clavicular, axillary, or inguinal lymphadenopathy  NEUROLOGIC: follows commands, non focal   PSYCH: mood and affect appropriate. Alert and oriented to time, place, person    LABORATORY RESULTS REVIEWED BY ME:  Lab Results   Component Value Date    CEA 2.2 01/04/2021     Lab Results   Component Value Date    WBC 6.21 05/03/2021    HGB 15.6 05/03/2021    HCT 48.8 05/03/2021    MCV 88.4 05/03/2021     (L) 05/03/2021     Lab Results   Component Value Date    NEUTROABS 4.34 05/03/2021     RADIOLOGY STUDIES REVIEWED   1/7/21 CT chest: No finding to suggest intrathoracic neoplastic process or metastatic disease. The benign-appearing tiny nodule in the right upper lobe probably represent a noncalcified granuloma. A nodule in the lingular segment of the left upper lobe is not visualized in this study. Postsurgical changes of the liver. No evidence of focal complication. A trace right basal pleural effusion. This may be reactive to the previous abdominal surgery. 3/4/21 MRI abd: Status post right hepatectomy and microwave ablation of a left liver lesion. No findings to suggest residual/recurrent disease. No new liver lesion is identified. Postsurgical changes related to right hemicolectomy. Microwave ablation zone in segment II of the left hepatic lobe measures approximately 21 mm in diameter, unchanged. No appreciable postcontrast enhancement or other findings to suggest local disease recurrence. No new liver lesion is identified. Spleen is mildly enlarged, measuring 13.8 cm in length. ASSESSMENT:  #Colonic adenocarcinoma-  [id number] (liver) K-maria luisa mutated, IHC MMR not proficient  Status post microwave ablation left hepatic lesion  Status post neoadjuvant FOLFOX/bevacizumab x11 biweekly cycles  Status post right hemicolectomy. Pathology consistent complete pathologic response. Recommended surveillance as per NCCN guidelines  Discussed at length results of pathology with the patient.   3/17/21- 1 year colonoscopy showed no large polyps or masses. Repeat in 3 years. #Chronic kidney disease stage III- creatinine 1.5. Referred to nephrology. #Liver metastasis- plan as above. Status post ablation left liver lobe lesion at [facility] on 6/8/2020. Status post neoadjuvant FOLFOX/bevacizumab x11 biweekly cyclesStatus post right hemicolectomy. Pathology consistent complete pathologic response. 3/4/2021-MRI abdomen was unremarkable  Next MRI June 2021 at [facility]    #Iron deficiency anemia-  hemoglobin 8.5/MCV 89. Ferritin 12.9, iron saturation 15%, TIBC 458, iron 72. Status post IV iron therapy. Hemoglobin 15.6/MCV 88    PLAN:    · Follow-up Dr. Soraay Jhaveri for post-op care 6/8/21   · CT chest without contrast mid June 2021  · CBC, CMP CEA today  · RTC with MD 4 months    Tatianna JACOME am scribing for Gideon Hernandez MD. Electronically signed by Tatianna Whitman RN on 4/29/2021 at 11:08 AM CDT. I, Dr Sol Lock, personally performed the services described in this documentation as scribed by Tatianna Whitman RN in my presence and is both accurate and complete. I have seen, examined and reviewed this patient medication list, appropriate labs and imaging studies. I reviewed relevant medical records and others physicians notes. I discussed the plans of care with the patient. I answered all the questions to the patients satisfaction. I have also reviewed the chief complaint (CC) and part of the history (History of Present Illness (HPI), Past Family Social History [facility]), or Review of Systems (ROS) and made changes when appropriated.        (Please note that portions of this note were completed with a voice recognition program. Efforts were made to edit the dictations but occasionally words are mis-transcribed.)      Tatianna Whitman RN    04/29/21  11:08 AM knee pain/injury

## 2021-05-20 ENCOUNTER — APPOINTMENT (OUTPATIENT)
Dept: OBGYN | Facility: CLINIC | Age: 51
End: 2021-05-20

## 2021-06-22 ENCOUNTER — APPOINTMENT (OUTPATIENT)
Dept: INTERNAL MEDICINE | Facility: CLINIC | Age: 51
End: 2021-06-22
Payer: COMMERCIAL

## 2021-06-22 VITALS
SYSTOLIC BLOOD PRESSURE: 146 MMHG | BODY MASS INDEX: 37.28 KG/M2 | OXYGEN SATURATION: 98 % | HEART RATE: 76 BPM | DIASTOLIC BLOOD PRESSURE: 93 MMHG | WEIGHT: 232 LBS | HEIGHT: 66 IN | TEMPERATURE: 97.7 F

## 2021-06-22 VITALS — SYSTOLIC BLOOD PRESSURE: 130 MMHG | DIASTOLIC BLOOD PRESSURE: 78 MMHG

## 2021-06-22 DIAGNOSIS — M25.569 PAIN IN UNSPECIFIED KNEE: ICD-10-CM

## 2021-06-22 DIAGNOSIS — S83.282A OTHER TEAR OF LATERAL MENISCUS, CURRENT INJURY, LEFT KNEE, INITIAL ENCOUNTER: ICD-10-CM

## 2021-06-22 DIAGNOSIS — S83.242A OTHER TEAR OF MEDIAL MENISCUS, CURRENT INJURY, LEFT KNEE, INITIAL ENCOUNTER: ICD-10-CM

## 2021-06-22 PROCEDURE — 99214 OFFICE O/P EST MOD 30 MIN: CPT

## 2021-06-22 PROCEDURE — 99072 ADDL SUPL MATRL&STAF TM PHE: CPT

## 2021-06-22 NOTE — PHYSICAL EXAM
[No Acute Distress] : no acute distress [Well Nourished] : well nourished [Well Developed] : well developed [Well-Appearing] : well-appearing [Normal Sclera/Conjunctiva] : normal sclera/conjunctiva [PERRL] : pupils equal round and reactive to light [EOMI] : extraocular movements intact [Normal Outer Ear/Nose] : the outer ears and nose were normal in appearance [Normal Oropharynx] : the oropharynx was normal [No JVD] : no jugular venous distention [No Lymphadenopathy] : no lymphadenopathy [Supple] : supple [Thyroid Normal, No Nodules] : the thyroid was normal and there were no nodules present [No Respiratory Distress] : no respiratory distress  [No Accessory Muscle Use] : no accessory muscle use [Clear to Auscultation] : lungs were clear to auscultation bilaterally [Normal Rate] : normal rate  [Regular Rhythm] : with a regular rhythm [Normal S1, S2] : normal S1 and S2 [No Murmur] : no murmur heard [No Carotid Bruits] : no carotid bruits [No Abdominal Bruit] : a ~M bruit was not heard ~T in the abdomen [No Varicosities] : no varicosities [Pedal Pulses Present] : the pedal pulses are present [No Edema] : there was no peripheral edema [No Palpable Aorta] : no palpable aorta [No Extremity Clubbing/Cyanosis] : no extremity clubbing/cyanosis [Soft] : abdomen soft [Non Tender] : non-tender [Non-distended] : non-distended [No Masses] : no abdominal mass palpated [No HSM] : no HSM [Normal Bowel Sounds] : normal bowel sounds [Normal Posterior Cervical Nodes] : no posterior cervical lymphadenopathy [Normal Anterior Cervical Nodes] : no anterior cervical lymphadenopathy [No CVA Tenderness] : no CVA  tenderness [No Spinal Tenderness] : no spinal tenderness [Grossly Normal Strength/Tone] : grossly normal strength/tone [No Rash] : no rash [Coordination Grossly Intact] : coordination grossly intact [No Focal Deficits] : no focal deficits [Normal Gait] : normal gait [Deep Tendon Reflexes (DTR)] : deep tendon reflexes were 2+ and symmetric [Normal Affect] : the affect was normal [Normal Insight/Judgement] : insight and judgment were intact [de-identified] : L knee warmth and marked swelling Yvette for BOTH LAT and MED MENISCAL TEAR POSITIVE  cruciate and medial col;ateral NORMAL

## 2021-06-22 NOTE — HISTORY OF PRESENT ILLNESS
[FreeTextEntry1] : Hypothyroidism and obesity management [de-identified] : 50 yo\par \par In June 2 2019 fell down basement BL knee pain swelling pain and L foot\par March 2021 stretcher wheel across R foot\par \par Since last week both knees and feet hurt. No recent knee trauma.  Ibuprofen x 1 time 600mg  YESTERDAY\par No LOC no fever no rash no systemic illness\par NO ETOH   NO HOT JOINT  ++++ TENDER  MICHELLE  NO CATCH

## 2021-06-22 NOTE — REVIEW OF SYSTEMS
[Fever] : no fever [Recent Change In Weight] : ~T no recent weight change [Chest Pain] : no chest pain [Palpitations] : no palpitations [Shortness Of Breath] : no shortness of breath [Joint Pain] : joint pain [Joint Swelling] : joint swelling [Muscle Weakness] : no muscle weakness [Skin Rash] : no skin rash

## 2021-06-28 ENCOUNTER — APPOINTMENT (OUTPATIENT)
Dept: MRI IMAGING | Facility: IMAGING CENTER | Age: 51
End: 2021-06-28
Payer: COMMERCIAL

## 2021-06-28 ENCOUNTER — OUTPATIENT (OUTPATIENT)
Dept: OUTPATIENT SERVICES | Facility: HOSPITAL | Age: 51
LOS: 1 days | End: 2021-06-28
Payer: COMMERCIAL

## 2021-06-28 DIAGNOSIS — S83.232A COMPLEX TEAR OF MEDIAL MENISCUS, CURRENT INJURY, LEFT KNEE, INITIAL ENCOUNTER: ICD-10-CM

## 2021-06-28 PROCEDURE — 73721 MRI JNT OF LWR EXTRE W/O DYE: CPT | Mod: 26,RT

## 2021-06-28 PROCEDURE — 73721 MRI JNT OF LWR EXTRE W/O DYE: CPT

## 2021-07-27 ENCOUNTER — TRANSCRIPTION ENCOUNTER (OUTPATIENT)
Age: 51
End: 2021-07-27

## 2021-07-30 ENCOUNTER — APPOINTMENT (OUTPATIENT)
Dept: OBGYN | Facility: CLINIC | Age: 51
End: 2021-07-30
Payer: COMMERCIAL

## 2021-07-30 ENCOUNTER — ASOB RESULT (OUTPATIENT)
Age: 51
End: 2021-07-30

## 2021-07-30 VITALS — DIASTOLIC BLOOD PRESSURE: 80 MMHG | SYSTOLIC BLOOD PRESSURE: 130 MMHG

## 2021-07-30 DIAGNOSIS — N85.01 BENIGN ENDOMETRIAL HYPERPLASIA: ICD-10-CM

## 2021-07-30 LAB — HCG UR QL: NEGATIVE

## 2021-07-30 PROCEDURE — 58100 BIOPSY OF UTERUS LINING: CPT

## 2021-07-30 PROCEDURE — 76856 US EXAM PELVIC COMPLETE: CPT

## 2021-07-30 PROCEDURE — 81025 URINE PREGNANCY TEST: CPT

## 2021-08-11 RX ORDER — SEMAGLUTIDE 1.7 MG/.75ML
1.7 INJECTION, SOLUTION SUBCUTANEOUS
Qty: 3 | Refills: 0 | Status: DISCONTINUED | COMMUNITY
Start: 2021-08-05 | End: 2021-08-11

## 2021-08-30 LAB — CORE LAB BIOPSY: NORMAL

## 2021-09-09 ENCOUNTER — NON-APPOINTMENT (OUTPATIENT)
Age: 51
End: 2021-09-09

## 2021-09-10 ENCOUNTER — OUTPATIENT (OUTPATIENT)
Dept: OUTPATIENT SERVICES | Facility: HOSPITAL | Age: 51
LOS: 1 days | End: 2021-09-10
Payer: COMMERCIAL

## 2021-09-10 VITALS
TEMPERATURE: 98 F | DIASTOLIC BLOOD PRESSURE: 88 MMHG | RESPIRATION RATE: 16 BRPM | OXYGEN SATURATION: 99 % | HEART RATE: 89 BPM | SYSTOLIC BLOOD PRESSURE: 130 MMHG | HEIGHT: 67 IN | WEIGHT: 231.04 LBS

## 2021-09-10 DIAGNOSIS — Z01.818 ENCOUNTER FOR OTHER PREPROCEDURAL EXAMINATION: ICD-10-CM

## 2021-09-10 DIAGNOSIS — Z98.890 OTHER SPECIFIED POSTPROCEDURAL STATES: Chronic | ICD-10-CM

## 2021-09-10 DIAGNOSIS — N85.01 BENIGN ENDOMETRIAL HYPERPLASIA: ICD-10-CM

## 2021-09-10 LAB
ANION GAP SERPL CALC-SCNC: 13 MMOL/L — SIGNIFICANT CHANGE UP (ref 5–17)
BLD GP AB SCN SERPL QL: NEGATIVE — SIGNIFICANT CHANGE UP
BUN SERPL-MCNC: 14 MG/DL — SIGNIFICANT CHANGE UP (ref 7–23)
CALCIUM SERPL-MCNC: 9.7 MG/DL — SIGNIFICANT CHANGE UP (ref 8.4–10.5)
CHLORIDE SERPL-SCNC: 104 MMOL/L — SIGNIFICANT CHANGE UP (ref 96–108)
CO2 SERPL-SCNC: 26 MMOL/L — SIGNIFICANT CHANGE UP (ref 22–31)
CREAT SERPL-MCNC: 1 MG/DL — SIGNIFICANT CHANGE UP (ref 0.5–1.3)
GLUCOSE SERPL-MCNC: 84 MG/DL — SIGNIFICANT CHANGE UP (ref 70–99)
HCT VFR BLD CALC: 44.2 % — SIGNIFICANT CHANGE UP (ref 34.5–45)
HGB BLD-MCNC: 14.2 G/DL — SIGNIFICANT CHANGE UP (ref 11.5–15.5)
MCHC RBC-ENTMCNC: 26.5 PG — LOW (ref 27–34)
MCHC RBC-ENTMCNC: 32.1 GM/DL — SIGNIFICANT CHANGE UP (ref 32–36)
MCV RBC AUTO: 82.5 FL — SIGNIFICANT CHANGE UP (ref 80–100)
NRBC # BLD: 0 /100 WBCS — SIGNIFICANT CHANGE UP (ref 0–0)
PLATELET # BLD AUTO: 164 K/UL — SIGNIFICANT CHANGE UP (ref 150–400)
POTASSIUM SERPL-MCNC: 3.8 MMOL/L — SIGNIFICANT CHANGE UP (ref 3.5–5.3)
POTASSIUM SERPL-SCNC: 3.8 MMOL/L — SIGNIFICANT CHANGE UP (ref 3.5–5.3)
RBC # BLD: 5.36 M/UL — HIGH (ref 3.8–5.2)
RBC # FLD: 15.2 % — HIGH (ref 10.3–14.5)
RH IG SCN BLD-IMP: POSITIVE — SIGNIFICANT CHANGE UP
SODIUM SERPL-SCNC: 143 MMOL/L — SIGNIFICANT CHANGE UP (ref 135–145)
WBC # BLD: 5.06 K/UL — SIGNIFICANT CHANGE UP (ref 3.8–10.5)
WBC # FLD AUTO: 5.06 K/UL — SIGNIFICANT CHANGE UP (ref 3.8–10.5)

## 2021-09-10 PROCEDURE — G0463: CPT

## 2021-09-10 PROCEDURE — 86901 BLOOD TYPING SEROLOGIC RH(D): CPT

## 2021-09-10 PROCEDURE — 80048 BASIC METABOLIC PNL TOTAL CA: CPT

## 2021-09-10 PROCEDURE — 86850 RBC ANTIBODY SCREEN: CPT

## 2021-09-10 PROCEDURE — 85027 COMPLETE CBC AUTOMATED: CPT

## 2021-09-10 PROCEDURE — 86900 BLOOD TYPING SEROLOGIC ABO: CPT

## 2021-09-10 RX ORDER — LIDOCAINE HCL 20 MG/ML
0.2 VIAL (ML) INJECTION ONCE
Refills: 0 | Status: DISCONTINUED | OUTPATIENT
Start: 2021-09-22 | End: 2021-10-06

## 2021-09-10 RX ORDER — SODIUM CHLORIDE 9 MG/ML
3 INJECTION INTRAMUSCULAR; INTRAVENOUS; SUBCUTANEOUS EVERY 8 HOURS
Refills: 0 | Status: DISCONTINUED | OUTPATIENT
Start: 2021-09-22 | End: 2021-10-06

## 2021-09-10 NOTE — H&P PST ADULT - NSICDXPASTSURGICALHX_GEN_ALL_CORE_FT
PAST SURGICAL HISTORY:  H/O arthroscopy of shoulder right, 2012    H/O myomectomy 2006, 2012    History of subtotal thyroidectomy 2005    S/P D&C (status post dilation and curettage) with polypectomy, 05/21/21

## 2021-09-10 NOTE — H&P PST ADULT - NSICDXPASTMEDICALHX_GEN_ALL_CORE_FT
PAST MEDICAL HISTORY:  Bilateral chronic knee pain     Chronic back pain     Cold thyroid nodule     Fibroids     Hypothyroidism     Obese     Polyp of corpus uteri

## 2021-09-10 NOTE — H&P PST ADULT - PROBLEM SELECTOR PLAN 1
D&C, operative hysteroscopy with Ultrasound Guidance. D&C, operative hysteroscopy with Ultrasound Guidance.    Pt. is on Saxenda for weight loss - will stop on 09/19/21, will confirm with Dr. Taylor.

## 2021-09-10 NOTE — H&P PST ADULT - HISTORY OF PRESENT ILLNESS
51 y.o. F with h/o hypothyroidism, uterine leiomyoma, endometrial polyps, recent bx revealed simple endometrial hyperplasia - scheduled for D&C, operative hysteroscopy with Ultrasound Guidance.     **Covid 19 PCR 09/19/21.

## 2021-09-14 ENCOUNTER — NON-APPOINTMENT (OUTPATIENT)
Age: 51
End: 2021-09-14

## 2021-09-14 PROBLEM — M25.561 PAIN IN RIGHT KNEE: Chronic | Status: ACTIVE | Noted: 2021-09-10

## 2021-09-14 PROBLEM — N84.0 POLYP OF CORPUS UTERI: Chronic | Status: ACTIVE | Noted: 2021-09-10

## 2021-09-19 ENCOUNTER — OUTPATIENT (OUTPATIENT)
Dept: OUTPATIENT SERVICES | Facility: HOSPITAL | Age: 51
LOS: 1 days | End: 2021-09-19
Payer: COMMERCIAL

## 2021-09-19 DIAGNOSIS — Z98.890 OTHER SPECIFIED POSTPROCEDURAL STATES: Chronic | ICD-10-CM

## 2021-09-19 DIAGNOSIS — Z11.52 ENCOUNTER FOR SCREENING FOR COVID-19: ICD-10-CM

## 2021-09-19 LAB — SARS-COV-2 RNA SPEC QL NAA+PROBE: SIGNIFICANT CHANGE UP

## 2021-09-19 PROCEDURE — C9803: CPT

## 2021-09-19 PROCEDURE — U0005: CPT

## 2021-09-19 PROCEDURE — U0003: CPT

## 2021-09-21 ENCOUNTER — TRANSCRIPTION ENCOUNTER (OUTPATIENT)
Age: 51
End: 2021-09-21

## 2021-09-22 ENCOUNTER — APPOINTMENT (OUTPATIENT)
Dept: ULTRASOUND IMAGING | Facility: HOSPITAL | Age: 51
End: 2021-09-22

## 2021-09-22 ENCOUNTER — RESULT REVIEW (OUTPATIENT)
Age: 51
End: 2021-09-22

## 2021-09-22 ENCOUNTER — APPOINTMENT (OUTPATIENT)
Dept: OBGYN | Facility: CLINIC | Age: 51
End: 2021-09-22
Payer: COMMERCIAL

## 2021-09-22 ENCOUNTER — OUTPATIENT (OUTPATIENT)
Dept: OUTPATIENT SERVICES | Facility: HOSPITAL | Age: 51
LOS: 1 days | End: 2021-09-22
Payer: COMMERCIAL

## 2021-09-22 VITALS
HEART RATE: 70 BPM | WEIGHT: 231.04 LBS | HEIGHT: 67 IN | DIASTOLIC BLOOD PRESSURE: 85 MMHG | SYSTOLIC BLOOD PRESSURE: 138 MMHG | TEMPERATURE: 97 F | OXYGEN SATURATION: 100 % | RESPIRATION RATE: 20 BRPM

## 2021-09-22 VITALS
TEMPERATURE: 98 F | HEART RATE: 67 BPM | SYSTOLIC BLOOD PRESSURE: 157 MMHG | RESPIRATION RATE: 16 BRPM | OXYGEN SATURATION: 98 % | DIASTOLIC BLOOD PRESSURE: 74 MMHG

## 2021-09-22 DIAGNOSIS — N85.01 BENIGN ENDOMETRIAL HYPERPLASIA: ICD-10-CM

## 2021-09-22 DIAGNOSIS — Z98.890 OTHER SPECIFIED POSTPROCEDURAL STATES: Chronic | ICD-10-CM

## 2021-09-22 LAB — RH IG SCN BLD-IMP: POSITIVE — SIGNIFICANT CHANGE UP

## 2021-09-22 PROCEDURE — 58120 DILATION AND CURETTAGE: CPT

## 2021-09-22 PROCEDURE — 76998 US GUIDE INTRAOP: CPT

## 2021-09-22 PROCEDURE — ZZZZZ: CPT

## 2021-09-22 PROCEDURE — 58555 HYSTEROSCOPY DX SEP PROC: CPT

## 2021-09-22 PROCEDURE — 88305 TISSUE EXAM BY PATHOLOGIST: CPT

## 2021-09-22 PROCEDURE — 88305 TISSUE EXAM BY PATHOLOGIST: CPT | Mod: 26

## 2021-09-22 RX ORDER — SODIUM CHLORIDE 9 MG/ML
1000 INJECTION, SOLUTION INTRAVENOUS
Refills: 0 | Status: DISCONTINUED | OUTPATIENT
Start: 2021-09-22 | End: 2021-10-06

## 2021-09-22 RX ORDER — FENTANYL CITRATE 50 UG/ML
25 INJECTION INTRAVENOUS
Refills: 0 | Status: DISCONTINUED | OUTPATIENT
Start: 2021-09-22 | End: 2021-09-22

## 2021-09-22 RX ORDER — ONDANSETRON 8 MG/1
4 TABLET, FILM COATED ORAL ONCE
Refills: 0 | Status: DISCONTINUED | OUTPATIENT
Start: 2021-09-22 | End: 2021-10-06

## 2021-09-22 NOTE — BRIEF OPERATIVE NOTE - NSICDXBRIEFPOSTOP_GEN_ALL_CORE_FT
POST-OP DIAGNOSIS:  Cervical stenosis (uterine cervix) 22-Sep-2021 13:08:25  Cayla Amaya  Dysfunctional uterine bleeding 22-Sep-2021 13:08:50  Cayla Amaya  Endometrial hyperplasia 22-Sep-2021 13:08:58  Cayla Amaya

## 2021-09-22 NOTE — BRIEF OPERATIVE NOTE - NSICDXBRIEFPROCEDURE_GEN_ALL_CORE_FT
PROCEDURES:  Diagnostic hysteroscopy 22-Sep-2021 13:08:41  Cayla Amaya  Dilation and curettage 22-Sep-2021 13:08:44  Cayla Amaya  Exam, under anesthesia 22-Sep-2021 13:09:02  Cayla Amaya

## 2021-09-22 NOTE — BRIEF OPERATIVE NOTE - NSICDXBRIEFPREOP_GEN_ALL_CORE_FT
PRE-OP DIAGNOSIS:  Cervical stenosis (uterine cervix) 22-Sep-2021 13:08:22  Cayla Amaya  Dysfunctional uterine bleeding 22-Sep-2021 13:08:48  Cayla Amaya  Endometrial hyperplasia 22-Sep-2021 13:08:55  Cayla Amaya

## 2021-09-22 NOTE — ASU DISCHARGE PLAN (ADULT/PEDIATRIC) - ASU DC SPECIAL INSTRUCTIONSFT
MD DB QUEVEDO MD MARINESE DORENE, MD CHUNG HETTY, MD HUNTER TIFFANY, MD AHAMED TARNIMA, MD  PHONE: [phone]  FAX: [phone]    POSTOPERATIVE INSTRUCTIONS FOR HYSTEROSCOPY, ENDOMETRIAL POLYP/FIBROID REMOVAL,D&C    After your surgery it is normal to experience:    •	Vaginal bleeding- can last 1-2 weeks and should not be heavier than a period. It may come and go and be red, brown or pink. Use pads, not tampons.  •	Cramping- Is common especially within the first 24 hours. This should be relieved by taking over the counter motrin, advil or Tylenol.  •	Watery discharge- can be seen for a day or two after hysteroscopy because some of the fluid that is put into the uterus during surgery may continue to leak out for a day or two.  •	Vaginal soreness/irritation- can occur in the first few days after surgery because of the instruments that were used in the vagina. Soreness can be treated with ice pack and irritation can be taken care of with an emollient such as balmex or aquaphor that you can put directly on the irritated area.    Restrictions: For 10-14 days after the surgery you should avoid the following:    •	Tampons  •	Sex  •	Vigorous gym exercise  •	Swimming  pools and tub baths  •	Wait a day or two before going back to work    Anesthesia Precautions:  For the next 12 hours do not:   •	drive a car,  •	 operate power tools or machinery,  •	drink alcohol, beer, or wine,   •	make important personal or business decisions    Diet:   •	Resume Regular diet but Progress diet slowly     Physician Notification- Warning signs to look out for  •	Heavy Vaginal Bleeding   •	Shortness of breath or chest pain  •	Severe Abdominal Pain  •	Persistent nausea and vomiting  •	Pain not relieved by medications  •	Fever greater than 100.5®F  •	Inability to tolerate liquids or foods  •	Unable to urinate after 8 hours    Discharge and Disposition  •	Discharge to home    Follow Up Care:  •	In the event that you develop a complication and you are unable to reach your own physician, you may contact:  [phone] or go to the nearest Emergency Room.   •	Please contact the office to make a follow up appointment [phone]

## 2021-09-30 ENCOUNTER — APPOINTMENT (OUTPATIENT)
Dept: ENDOCRINOLOGY | Facility: CLINIC | Age: 51
End: 2021-09-30
Payer: COMMERCIAL

## 2021-09-30 LAB — SURGICAL PATHOLOGY STUDY: SIGNIFICANT CHANGE UP

## 2021-09-30 PROCEDURE — 99442: CPT

## 2021-10-11 ENCOUNTER — NON-APPOINTMENT (OUTPATIENT)
Age: 51
End: 2021-10-11

## 2021-10-14 ENCOUNTER — NON-APPOINTMENT (OUTPATIENT)
Age: 51
End: 2021-10-14

## 2021-10-29 ENCOUNTER — APPOINTMENT (OUTPATIENT)
Dept: OBGYN | Facility: CLINIC | Age: 51
End: 2021-10-29
Payer: COMMERCIAL

## 2021-10-29 ENCOUNTER — ASOB RESULT (OUTPATIENT)
Age: 51
End: 2021-10-29

## 2021-10-29 VITALS — DIASTOLIC BLOOD PRESSURE: 80 MMHG | SYSTOLIC BLOOD PRESSURE: 120 MMHG

## 2021-10-29 PROCEDURE — 99213 OFFICE O/P EST LOW 20 MIN: CPT

## 2021-10-29 PROCEDURE — 76830 TRANSVAGINAL US NON-OB: CPT

## 2021-11-26 ENCOUNTER — OUTPATIENT (OUTPATIENT)
Dept: OUTPATIENT SERVICES | Facility: HOSPITAL | Age: 51
LOS: 1 days | End: 2021-11-26
Payer: COMMERCIAL

## 2021-11-26 ENCOUNTER — APPOINTMENT (OUTPATIENT)
Dept: MRI IMAGING | Facility: CLINIC | Age: 51
End: 2021-11-26
Payer: COMMERCIAL

## 2021-11-26 DIAGNOSIS — D25.9 LEIOMYOMA OF UTERUS, UNSPECIFIED: ICD-10-CM

## 2021-11-26 DIAGNOSIS — Z98.890 OTHER SPECIFIED POSTPROCEDURAL STATES: Chronic | ICD-10-CM

## 2021-11-26 PROCEDURE — 72197 MRI PELVIS W/O & W/DYE: CPT | Mod: 26

## 2021-11-26 PROCEDURE — 72197 MRI PELVIS W/O & W/DYE: CPT

## 2021-12-02 NOTE — H&P PST ADULT - NS NEC GEN PE MLT EXAM PC
Received a fax from [facility]. Needing scripts sent to [facility] for morphine, lorazepam 0.5MG, hyoscyamine 0.125po  
detailed exam

## 2021-12-08 ENCOUNTER — APPOINTMENT (OUTPATIENT)
Dept: ULTRASOUND IMAGING | Facility: CLINIC | Age: 51
End: 2021-12-08
Payer: COMMERCIAL

## 2021-12-08 ENCOUNTER — RESULT REVIEW (OUTPATIENT)
Age: 51
End: 2021-12-08

## 2021-12-08 PROCEDURE — 76536 US EXAM OF HEAD AND NECK: CPT

## 2021-12-10 ENCOUNTER — NON-APPOINTMENT (OUTPATIENT)
Age: 51
End: 2021-12-10

## 2021-12-10 ENCOUNTER — TRANSCRIPTION ENCOUNTER (OUTPATIENT)
Age: 51
End: 2021-12-10

## 2021-12-31 NOTE — H&P PST ADULT - FUNCTIONAL SCREEN CURRENT LEVEL: COMMUNICATION, MLM
CHIEF COMPLAINT:  Chief Complaint   Patient presents with   • Hip Injury         HPI  Bebeto Gaspar is a 86 year old male with a history of atrial fibrillation on no anticoagulation therapy who presents via ambulance following a fall that occurred just prior to ED arrival. Patient states that he was turning to adjust a calendar when he lost his balance and fell, landing forcefully on his left hip; he was able to hoist himself into his wheelchair afterward but has experienced significant pain to the left hip since the fall. He states that he is unsure whether or not he sustained head trauma as a result of the fall. EMS personnel report having found the patient to be hypoxic en route to the ED, prompting them to place him on 5 L/min of oxygen supplementation by nasal cannula. Patient denies having experienced any symptoms preceding his fall and denies headache, neck pain, chest pain, abdominal pain, back pain, left upper extremity pain, and lower extremity pain.    ALLERGIES:  ALLERGIES:   Allergen Reactions   • Metoprolol Other (See Comments)     Intractable headache   • Erythromycin Other (See Comments)     Anal itching       CURRENT MEDICATIONS:  Current Facility-Administered Medications   Medication Dose Route Frequency Provider Last Rate Last Admin   • melatonin tablet 9 mg  9 mg Oral Nightly PRN Brenden Mendoza MD       • sodium chloride (NORMAL SALINE) 0.9 % bolus 500 mL  500 mL Intravenous PRN Brenden Mendoza MD       • sodium chloride 0.9 % flush bag 25 mL  25 mL Intravenous PRN Brenden Mendoza MD       • sodium chloride (PF) 0.9 % injection 2 mL  2 mL Intracatheter 2 times per day Brenden Mendoza MD       • sodium chloride 0.9 % flush bag 25 mL  25 mL Intravenous PRN Brenden Mendoza MD       • heparin (porcine) injection 5,000 Units  5,000 Units Subcutaneous 3 times per day Brenden Mendoza MD       • Potassium Standard Replacement Protocol   Does not apply See Admin Instructions Brenden Mendoza MD       •  Magnesium Standard Replacement Protocol   Does not apply See Admin Instructions Brenden Mendoza MD       • acetaminophen (TYLENOL) tablet 650 mg  650 mg Oral Q4H PRN Brenden Mendoza MD       • oxyCODONE-acetaminophen (PERCOCET) 5-325 MG tablet 1 tablet  1 tablet Oral Q4H PRN Brenden Mendoza MD       • polyethylene glycol (MIRALAX) packet 17 g  17 g Oral Daily PRN Brenden Mendoza MD       • docusate sodium-sennosides (SENOKOT S) 50-8.6 MG 2 tablet  2 tablet Oral Daily PRN Brenden Mendoza MD       • bisacodyl (DULCOLAX) suppository 10 mg  10 mg Rectal Daily PRN Brenden Mendoza MD       • aluminum-magnesium hydroxide-simethicone (MAALOX) 200-200-20 MG/5ML suspension 30 mL  30 mL Oral Q4H PRN Brenden Mendoza MD       • magnesium hydroxide (MILK OF MAGNESIA) 400 MG/5ML suspension 30 mL  30 mL Oral Daily PRN Brenden Mendoza MD       • LIDOCAINE HCL URETHRAL/MUCOSAL 2 % EX GEL (UROJET/GLYDO) Pyxis Override            • lidocaine 2% urethral (UROJET) 2 % jelly 10 mL  10 mL Transurethral Once PRN Sohan Agudelo MD         Current Outpatient Medications   Medication Sig Dispense Refill   • calcium carbonate (Tums) 500 MG chewable tablet Chew 2 tablets by mouth 4 times daily as needed for Heartburn.     • famotidine (PEPCID) 20 MG tablet Take 1 tablet by mouth daily.     • cyclobenzaprine (FLEXERIL) 5 MG tablet Take 1 tablet by mouth 3 times daily as needed for Muscle spasms. 30 tablet 0   • Omega-3 Fatty Acids (Fish Oil) 1000 MG capsule Take 1,000 mg by mouth daily.     • Sodium Phosphates (Enema) 7-19 GM/118ML Enema Place 1 enema rectally daily as needed (constipation).  0   • HYDROcodone-acetaminophen (NORCO) 5-325 MG per tablet Take 1 tablet by mouth every 4 hours as needed for Pain. 12 tablet 0   • sertraline (ZOLOFT) 25 MG tablet Take 25 mg by mouth daily. Indications: Social Anxiety Disorder     • melatonin 3 MG Take 3 mg by mouth at bedtime. Indications: Trouble Sleeping     • tamsulosin (FLOMAX) 0.4 MG Cap Take 0.8 mg by  mouth at bedtime.     • carbamide peroxide (Debrox) 6.5 % otic solution Place 5 drops into both ears 2 times daily as needed for 4 days for Aural Cerumen Removal. May repeat 1 time. Flush ears after.     • ipratropium (ATROVENT) 0.06 % nasal spray Spray 2 sprays in each nostril 3 times daily as needed for Rhinitis.      • polyethylene glycol (MIRALAX) 17 GM/SCOOP powder Take 17 g by mouth nightly. Stir and dissolve powder in any 4 to 8 ounces of beverage, then drink.     • acetaminophen (TYLENOL) 325 MG tablet Take 2 tablets by mouth every 4 hours as needed for Pain. 30 tablet 0   • magnesium hydroxide (Milk of Magnesia) 400 MG/5ML suspension Take 30 mLs by mouth daily as needed for Constipation.     • polyethylene glycol (MIRALAX) 17 GM/SCOOP powder Take 17 g by mouth daily as needed (constipation). Stir and dissolve powder in any 4 to 8 ounces of beverage, then drink. 255 g 0   • bisacodyl (DULCOLAX) 10 MG suppository Place 1 suppository rectally daily as needed for Constipation.     • dilTIAZem (DILACOR XR) 180 MG 24 hr capsule Take 1 capsule by mouth daily.     • ondansetron (ZOFRAN) 4 MG tablet Take 1 tablet by mouth every 8 hours as needed for Nausea.     • polyvinyl alcohol (Artificial Tears) 1.4 % ophthalmic solution Place 1 drop into both eyes 4 times daily as needed (Dry eyes).     • Multiple Vitamins-Minerals (PreserVision AREDS 2) Cap Take 1 capsule by mouth 2 times daily. For eye health.      • ipratropium (ATROVENT) 0.06 % nasal spray Spray 2 sprays in each nostril at bedtime.          PAST MEDICAL HISTORY:  History reviewed. No pertinent past medical history.    SURGICAL HISTORY:  History reviewed. No pertinent surgical history.    SOCIAL HISTORY:  Social History     Tobacco Use   • Smoking status: Current Some Day Smoker     Packs/day: 0.00     Years: 73.00     Pack years: 0.00     Types: Cigarettes   • Smokeless tobacco: Never Used   Substance Use Topics   • Alcohol use: Yes     Alcohol/week: 1.0 -  2.0 standard drink     Types: 1 - 2 Shots of liquor per week   • Drug use: Not Currently       REVIEW OF SYSTEMS:    Constitutional:  Denies fever or chills.   Eyes:  Denies change in visual acuity. No eye pain  HENT:  Denies nasal congestion or sore throat.   Respiratory:  Denies cough or shortness of breath.   Cardiovascular:  Denies chest pain or edema.   GI:  No abdominal pain, no nausea or vomiting, no diarrhea  :  No dysuria/hematuria, no frequency/urgency  Musculoskeletal:  + left hip pain, no back pain  Integument:  Denies rash or skin lesions  Neurologic:  Denies headache, focal weakness or sensory changes.       PHYSICAL EXAM:  ED Triage Vitals [12/31/21 1257]   /73   Heart Rate 76   Resp 20   Temp 97.8 °F (36.6 °C)   SpO2 90 %     Vitals reviewed per nursing notes.    General:  Patient is in no acute distress, non toxic appearing, alert, pleasant & appropriate.  Head: Atraumatic, normal cephalic.  Eyes: No ptosis, edema or lesions of the lid.  Mouth:   Clear. No pooled secretions, uvular deviation or trismus.  No erythema or tonsillar exudate.  Neck: Supple, no lymphadenopathy.  Lungs:   Clear to auscultation bilaterally.  There are no chest wall lesions or tenderness.  Heart: Regular rate without appreciable murmur  Abdomen:  Soft, nontender, nondistended, without rebound, guarding or rigidity.  Skin: Warm, dry and intact without rash or petechiae.  Extremities:  Without clubbing, cyanosis, or edema. Dorsalis pedis pulses are 2+ bilaterally.  Musculoskeletal:  Tenderness present to the left hip. No other focal bony or joint tenderness.   Neuro:  Moves all extremities spontaneously. Able to understand and respond normally to questions.  Psych:  Mood and affect are normal.  Judgement is normal.    EKG:  Time 1300, rate 79, atrial fibrillation, incomplete right bundle branch block, nonspecific diffuse T wave flattening, no STEMI. No significant change as compared to previous EKG dated  4/28/2021    Lab Results  Results for orders placed or performed during the hospital encounter of 12/31/21   Basic Metabolic Panel   Result Value    Fasting Status     Sodium 141    Potassium 3.9    Chloride 107    Carbon Dioxide 23    Anion Gap 15    Glucose 92    BUN 24 (H)    Creatinine 1.16    Glomerular Filtration Rate 56 (L)     Comment: eGFR 30-59 mL/min/1.73m2 = Moderate decrease in kidney function. Stage 3 CKD (chronic kidney disease) or moderate kidney disease. Estimated GFR calculated using the 2009 CKD-EPI creatinine equation.    BUN/ Creatinine Ratio 21    Calcium 8.5   COVID/Flu/RSV panel   Result Value    Rapid SARS-COV-2 by PCR Not Detected    Influenza A by PCR Not Detected    Influenza B by PCR Not Detected    RSV BY PCR Not Detected    Isolation Guidelines      Comment: Do not use this test result as the sole decision-maker for discontinuation of isolation.   Clinical evaluation should be considered for other respiratory illness requiring transmission-based isolation.    -    No fever (<99.0 F/37.2 C) for at least 24 hours without the use of fever-reducing medications    AND  -    Respiratory symptoms have improved or resolved (e.g. cough, shortness of breath)     AND  -    COVID-19 negative test    See COVID-19 Deisolation Resource Guide    Procedural Comment      Comment: SARS-COV-2 nucleic acid has not been detected indicating the absence of COVID-19.    This test was performed using the Chronicity Xpert Xpress SARS-CoV-2/Flu/RSV RT-PCR test that has been given Emergency Use Authorization (EUA) by the [place] Food and Drug Administration (FDA).  These tests are considered definitive and do not need to be confirmed by another method.   CBC with Automated Differential (performable only)   Result Value    WBC 8.5    RBC 4.10 (L)    HGB 12.0 (L)    HCT 36.7 (L)    MCV 89.5    MCH 29.3    MCHC 32.7    RDW-CV 13.3    RDW-SD 43.8        NRBC 0    Neutrophil, Percent 76    Lymphocytes,  Percent 14    Mono, Percent 6    Eosinophils, Percent 2    Basophils, Percent 1    Immature Granulocytes 1    Absolute Neutrophils 6.6    Absolute Lymphocytes 1.2    Absolute Monocytes 0.5    Absolute Eosinophils  0.1    Absolute Basophils 0.0    Absolute Immmature Granulocytes 0.1   Prothrombin Time   Result Value    Prothrombin Time 11.1    INR 1.0     Comment: INR Therapeutic Range: 2.0 to 3.0 (2.5 to 3.5 recommended for recurrent thrombotic episodes and mechanical prosthetic heart valves.)       Radiology  CT HEAD WO CONTRAST   Final Result   IMPRESSION:   1. No acute intracranial hemorrhage, extraaxial fluid collection, shift of   the midline, or other mass effect.   2. No acute calvarial or skull base fracture.   3. Stable volume loss with stable periventricular chronic small vessel   ischemic disease      XR Chest AP or PA   Final Result   IMPRESSION:  No evidence of active pulmonary disease            XR Hip Left AP and Lateral   Final Result   IMPRESSION:  New impacted subcapital left femoral neck fracture           Last Vital Signs  Vitals:    12/31/21 1325 12/31/21 1329 12/31/21 1408 12/31/21 1520   BP: 135/77   138/72   BP Location:    LUE - Left upper extremity   Patient Position:    Supine   Pulse: 74   80   Resp: (!) 33 (!) 26 (!) 27 (!) 30   Temp:       TempSrc:       SpO2: 93%   94%   Weight:           Treatment in ED:  ED Medication Orders (From admission, onward)    Ordered Start     Status Ordering Provider    12/31/21 1501 12/31/21 1501  LIDOCAINE HCL URETHRAL/MUCOSAL 2 % EX GEL (UROJET/GLYDO) Pyxis Override        Note to Pharmacy: Gege Obando: cabinet override    Ordered     12/31/21 1346 12/31/21 1347  HYDROmorphone (DILAUDID) injection 0.5 mg  ONCE         Last MAR action: Given CASA MANCUSO    12/31/21 1301 12/31/21 1302  morphine injection 4 mg  ONCE         Last MAR action: Given CASA MANCUSO    12/31/21 1301 12/31/21 1302  ondansetron (ZOFRAN) injection 4 mg  ONCE         Last  MAR action: Given SOHAN AGUDELO S          ED Course as of 12/31/21 1540   Fri Dec 31, 2021   1356 Spoke with Dr. Bangura of Orthopedics regarding the patient's case. He will see the patient in consultation and will plan for surgery tomorrow. [CA]   1441 Discussed the patient's case with the hospitalist Dr. Mendoza. He agrees to admit the patient. [CA]      ED Course User Index  [CA] Robbingi Navarro       Patient with left hip fracture, neurovascularly intact.  Given IV pain medication in the emergency room.  Consult Dr. Bangura of Orthopedics who plans NPO after midnight and surgery tomorrow.  Patient in agreement.  Accepted for inpatient admission by Dr. Mendoza.    Diagnosis:  1. Closed fracture of left hip, initial encounter ([facility])         Disposition:  Admit  12/31/2021  2:42 PM    Telemetry Bed?: No  Admitting Physician: MANUEL MENDOZA [[id number]]  Is this a telephone or verbal order?: This is a telephone order from the admitting physician    Follow Up:  No follow-up provider specified.   Please understand this is a provisional diagnosis that can and may change. The diagnosis that you are discharged with is based on the symptoms you described and the diagnostic information available today. If any new symptoms occur or worsen, you should seek immediate re-evaluation at the nearest ED. If any symptoms persist, please follow up for reassessment.    Treatment:     Summary of your Discharge Medications      You have not been prescribed any medications.         This chart was documented by Juan Diego Navarro, acting as a scribe for Sohan Agudelo MD 12/31/2021, 1:02 PM.   The documentation recorded by the scribe accurately and completely reflects the service(s) I personally performed and the decisions made by me.      Sohan Agudelo MD  12/31/21 1443     0 = understands/communicates without difficulty

## 2022-01-18 ENCOUNTER — NON-APPOINTMENT (OUTPATIENT)
Age: 52
End: 2022-01-18

## 2022-03-29 LAB
ANION GAP SERPL CALC-SCNC: 14 MMOL/L
BASOPHILS # BLD AUTO: 0.01 K/UL
BASOPHILS NFR BLD AUTO: 0.2 %
BUN SERPL-MCNC: 19 MG/DL
CALCIUM SERPL-MCNC: 10.2 MG/DL
CHLORIDE SERPL-SCNC: 101 MMOL/L
CO2 SERPL-SCNC: 26 MMOL/L
CREAT SERPL-MCNC: 1.07 MG/DL
EGFR: 62 ML/MIN/1.73M2
EOSINOPHIL # BLD AUTO: 0.06 K/UL
EOSINOPHIL NFR BLD AUTO: 1.4 %
ERYTHROCYTE [SEDIMENTATION RATE] IN BLOOD BY WESTERGREN METHOD: 25 MM/HR
GLUCOSE SERPL-MCNC: 82 MG/DL
HCT VFR BLD CALC: 44.7 %
HGB BLD-MCNC: 14.2 G/DL
IMM GRANULOCYTES NFR BLD AUTO: 0 %
LYMPHOCYTES # BLD AUTO: 1.61 K/UL
LYMPHOCYTES NFR BLD AUTO: 38.5 %
MAN DIFF?: NORMAL
MCHC RBC-ENTMCNC: 26.6 PG
MCHC RBC-ENTMCNC: 31.8 GM/DL
MCV RBC AUTO: 83.7 FL
MONOCYTES # BLD AUTO: 0.35 K/UL
MONOCYTES NFR BLD AUTO: 8.4 %
NEUTROPHILS # BLD AUTO: 2.15 K/UL
NEUTROPHILS NFR BLD AUTO: 51.5 %
PLATELET # BLD AUTO: 200 K/UL
POTASSIUM SERPL-SCNC: 4.3 MMOL/L
RBC # BLD: 5.34 M/UL
RBC # FLD: 15.5 %
SODIUM SERPL-SCNC: 140 MMOL/L
WBC # FLD AUTO: 4.18 K/UL

## 2022-04-04 ENCOUNTER — APPOINTMENT (OUTPATIENT)
Dept: ENDOCRINOLOGY | Facility: CLINIC | Age: 52
End: 2022-04-04

## 2022-05-05 ENCOUNTER — NON-APPOINTMENT (OUTPATIENT)
Age: 52
End: 2022-05-05

## 2022-05-16 ENCOUNTER — APPOINTMENT (OUTPATIENT)
Dept: ORTHOPEDIC SURGERY | Facility: CLINIC | Age: 52
End: 2022-05-16
Payer: COMMERCIAL

## 2022-05-16 VITALS — HEIGHT: 67 IN | WEIGHT: 215 LBS | BODY MASS INDEX: 33.74 KG/M2

## 2022-05-16 DIAGNOSIS — M65.331 TRIGGER FINGER, RIGHT MIDDLE FINGER: ICD-10-CM

## 2022-05-16 DIAGNOSIS — M65.341 TRIGGER FINGER, RIGHT RING FINGER: ICD-10-CM

## 2022-05-16 PROCEDURE — 99203 OFFICE O/P NEW LOW 30 MIN: CPT

## 2022-05-16 RX ORDER — DICLOFENAC SODIUM 1% 10 MG/G
1 GEL TOPICAL DAILY
Qty: 1 | Refills: 2 | Status: ACTIVE | COMMUNITY
Start: 2022-05-16 | End: 1900-01-01

## 2022-05-17 NOTE — ASSESSMENT
[FreeTextEntry1] : We reviewed the anatomy of the flexor sheath and pathology of trigger fingers with the use of drawings and discussion.  We discussed the treatment options including splinting/nsaids, injection and surgery.  We discussed that too many injections may lead to weakening o the tendon/tendon rupture and the safety of two injections. \par \par Pt declines CSI today and will utilize night splinting as well as prn Voltaren gel qid.\par NSAIDs recommended.  Patient warned of risk of NSAID medication to stomach and GI tract, risk of increase blood pressure, cardiac risk, and risk of fluid retention.  The patient should clear taking medication with internist/PMD if any problem with heart, blood pressure, or GI system exists. \par \par Pt will rto prn.\par \par Referred to Dr. Mckinley for bilateral knee OA and Dr. Funk for right plantar fasciitis.

## 2022-05-17 NOTE — HISTORY OF PRESENT ILLNESS
[4] : 4 [3] : 3 [Dull/Aching] : dull/aching [Localized] : localized [Full time] : Work status: full time [de-identified] : 5/16/2022: pt here with right hand pain x 2 weeks. There is no hx of trauma.\par Pt denies n/t. \par Pt states that s/p Ibuprofen her pain has improved. \par \par  [] : Post Surgical Visit: no [FreeTextEntry3] : 5/4/22 [FreeTextEntry5] : no recent injury. Patient went to [facility] on 5/4 and got xrays . Patient complains about ongiong pain in the right hand. [de-identified] : [facility] [FreeTextEntry6] : weaknes [de-identified] : none

## 2022-05-17 NOTE — PHYSICAL EXAM
[de-identified] : Right hand with no skin change/bony deformity.\par Pt has ttp over the right  2nd and 3rd  A1 pulley with no triggering noted. \par  and intrinsic strength is 5/5. \par All digits are nvi and with FAROM.  [FreeTextEntry1] : right hand [place] xrays shows no pathology.

## 2022-05-23 ENCOUNTER — APPOINTMENT (OUTPATIENT)
Dept: ORTHOPEDIC SURGERY | Facility: CLINIC | Age: 52
End: 2022-05-23
Payer: COMMERCIAL

## 2022-05-23 DIAGNOSIS — M76.821 POSTERIOR TIBIAL TENDINITIS, RIGHT LEG: ICD-10-CM

## 2022-05-23 PROCEDURE — 99214 OFFICE O/P EST MOD 30 MIN: CPT

## 2022-05-23 PROCEDURE — 73600 X-RAY EXAM OF ANKLE: CPT | Mod: RT

## 2022-05-23 PROCEDURE — 73630 X-RAY EXAM OF FOOT: CPT | Mod: RT

## 2022-05-23 PROCEDURE — L1902: CPT

## 2022-05-23 NOTE — ASSESSMENT
[FreeTextEntry1] : Due to the chronicity and failure to improve with conservative treatment, recommend MRI RT ankle to evaluate for posterior tibial tendon partial tearing. \par \par WBAT with supportive footwear, airlift brace.\par Ice to affected area.\par \par The patient was explained the options as well as benefits of over the counter verses prescription strength nonsteroidal anti-inflammatory medication. The patient opts for a prescription strength medication\par \par Recommend a course of PT.\par

## 2022-05-23 NOTE — PHYSICAL EXAM
[NL (40)] : plantar flexion 40 degrees [NL 30)] : inversion 30 degrees [NL (20)] : eversion 20 degrees [5___] : [facility] 5[unfilled]/5 [2+] : posterior tibialis pulse: 2+ [Normal] : saphenous nerve sensation normal [Mild] : mild diffused ankle swelling [4___] : inversion 4[unfilled]/5 [Right] : right ankle [Weight -] : weightbearing [There are no fractures, subluxations or dislocations. No significant abnormalities are seen] : There are no fractures, subluxations or dislocations. No significant abnormalities are seen [] : no pain when stressing lateral tarsal metatarsal joint [FreeTextEntry3] : Arch height slightly lower on RT compared to LT.  [de-identified] : Pes planus, hallux valgus, plantar heel spur.

## 2022-05-23 NOTE — HISTORY OF PRESENT ILLNESS
[9] : 9 [8] : 8 [Dull/Aching] : dull/aching [Localized] : localized [Constant] : constant [Leisure] : leisure [Work] : work [Social interactions] : social interactions [Rest] : rest [Sitting] : sitting [Standing] : standing [Walking] : walking [Stairs] : stairs [de-identified] : Pt is a 52 year old F who presents today for evaluation of their right foot. Pt states that she has severe pain and difficulty with standing for long with swelling. Symptoms over one year. Pain localized along the medial foot. Denies trauma/previous injury. No N/T. Ice to affected area. Received 2 CSI in the past w/ the last being over 6 months ago. Her pain has worsened over the past month. WB in sneakers.  [] : Post Surgical Visit: no [FreeTextEntry1] : R foot

## 2022-05-27 ENCOUNTER — APPOINTMENT (OUTPATIENT)
Dept: ORTHOPEDIC SURGERY | Facility: CLINIC | Age: 52
End: 2022-05-27

## 2022-06-27 ENCOUNTER — APPOINTMENT (OUTPATIENT)
Dept: OBGYN | Facility: CLINIC | Age: 52
End: 2022-06-27
Payer: COMMERCIAL

## 2022-06-27 ENCOUNTER — ASOB RESULT (OUTPATIENT)
Age: 52
End: 2022-06-27

## 2022-06-27 VITALS — SYSTOLIC BLOOD PRESSURE: 148 MMHG | DIASTOLIC BLOOD PRESSURE: 90 MMHG

## 2022-06-27 VITALS
SYSTOLIC BLOOD PRESSURE: 173 MMHG | WEIGHT: 230 LBS | HEIGHT: 67 IN | DIASTOLIC BLOOD PRESSURE: 97 MMHG | BODY MASS INDEX: 36.1 KG/M2

## 2022-06-27 PROCEDURE — 82270 OCCULT BLOOD FECES: CPT

## 2022-06-27 PROCEDURE — 99396 PREV VISIT EST AGE 40-64: CPT

## 2022-06-27 PROCEDURE — 36415 COLL VENOUS BLD VENIPUNCTURE: CPT

## 2022-06-27 PROCEDURE — 76830 TRANSVAGINAL US NON-OB: CPT

## 2022-06-29 LAB
C TRACH RRNA SPEC QL NAA+PROBE: NOT DETECTED
HPV HIGH+LOW RISK DNA PNL CVX: NOT DETECTED
N GONORRHOEA RRNA SPEC QL NAA+PROBE: NOT DETECTED
SOURCE TP AMPLIFICATION: NORMAL

## 2022-07-01 LAB — CYTOLOGY CVX/VAG DOC THIN PREP: NORMAL

## 2022-07-05 ENCOUNTER — APPOINTMENT (OUTPATIENT)
Dept: INTERNAL MEDICINE | Facility: CLINIC | Age: 52
End: 2022-07-05

## 2022-07-05 VITALS
SYSTOLIC BLOOD PRESSURE: 153 MMHG | OXYGEN SATURATION: 98 % | TEMPERATURE: 97.7 F | HEART RATE: 72 BPM | BODY MASS INDEX: 36.41 KG/M2 | DIASTOLIC BLOOD PRESSURE: 87 MMHG | HEIGHT: 67 IN | WEIGHT: 232 LBS

## 2022-07-05 VITALS — SYSTOLIC BLOOD PRESSURE: 144 MMHG | DIASTOLIC BLOOD PRESSURE: 84 MMHG

## 2022-07-05 DIAGNOSIS — Z00.00 ENCOUNTER FOR GENERAL ADULT MEDICAL EXAMINATION W/OUT ABNORMAL FINDINGS: ICD-10-CM

## 2022-07-05 PROCEDURE — 36415 COLL VENOUS BLD VENIPUNCTURE: CPT

## 2022-07-05 PROCEDURE — 99396 PREV VISIT EST AGE 40-64: CPT | Mod: 25

## 2022-07-05 RX ORDER — MISOPROSTOL 200 UG/1
200 TABLET ORAL
Qty: 2 | Refills: 0 | Status: COMPLETED | COMMUNITY
Start: 2021-08-29 | End: 2021-08-29

## 2022-07-05 RX ORDER — MELOXICAM 15 MG/1
15 TABLET ORAL DAILY
Qty: 30 | Refills: 1 | Status: DISCONTINUED | COMMUNITY
Start: 2021-06-22 | End: 2022-07-05

## 2022-07-05 NOTE — REVIEW OF SYSTEMS
[Fever] : no fever [Earache] : no earache [Chest Pain] : no chest pain [Shortness Of Breath] : no shortness of breath [Abdominal Pain] : no abdominal pain [Nausea] : no nausea

## 2022-07-05 NOTE — HISTORY OF PRESENT ILLNESS
[FreeTextEntry1] : CPE [de-identified] : 53 yo R post tibial tendonitis, vertigo saw Cardio  also cardio for IVF clearance\par uterine fibroid  May 2020 endo BX NORMAL\par \par Continued pain with inflammation  PT    meloxicam not ATC\par stopped olmisartan because BP dropped from 190 to 130\par Now BP at home 146   Last week  started taking\par FH  father sister  2 brothers\par \par pain 7/10\par wearing flip flops   await PT next week\par \par DIET: vegetables\par [employer] exercise\par \par Cath Lab Nurse\par Back olmisartan

## 2022-07-06 LAB
HBV SURFACE AG SER QL: NONREACTIVE
HCV AB SER QL: NONREACTIVE
HCV S/CO RATIO: 0.08 S/CO
HIV1+2 AB SPEC QL IA.RAPID: NONREACTIVE
TSH SERPL-ACNC: 0.6 UIU/ML

## 2022-07-07 LAB — T PALLIDUM AB SER QL IA: NEGATIVE

## 2022-07-11 ENCOUNTER — FORM ENCOUNTER (OUTPATIENT)
Age: 52
End: 2022-07-11

## 2022-07-12 ENCOUNTER — APPOINTMENT (OUTPATIENT)
Dept: MRI IMAGING | Facility: CLINIC | Age: 52
End: 2022-07-12

## 2022-07-12 PROCEDURE — 73721 MRI JNT OF LWR EXTRE W/O DYE: CPT

## 2022-07-20 ENCOUNTER — APPOINTMENT (OUTPATIENT)
Dept: ORTHOPEDIC SURGERY | Facility: CLINIC | Age: 52
End: 2022-07-20

## 2022-07-20 DIAGNOSIS — M72.2 PLANTAR FASCIAL FIBROMATOSIS: ICD-10-CM

## 2022-07-20 PROCEDURE — 76942 ECHO GUIDE FOR BIOPSY: CPT | Mod: RT

## 2022-07-20 PROCEDURE — 20551 NJX 1 TENDON ORIGIN/INSJ: CPT

## 2022-07-20 PROCEDURE — 99215 OFFICE O/P EST HI 40 MIN: CPT | Mod: 25

## 2022-07-20 PROCEDURE — L1902: CPT

## 2022-07-20 PROCEDURE — 76881 US COMPL JOINT R-T W/IMG: CPT

## 2022-07-20 PROCEDURE — J3490M: CUSTOM

## 2022-07-20 NOTE — PHYSICAL EXAM
[Right] : right foot and ankle [Mild] : mild diffused ankle swelling [NL (40)] : plantar flexion 40 degrees [NL 30)] : inversion 30 degrees [NL (20)] : eversion 20 degrees [2+] : posterior tibialis pulse: 2+ [Normal] : saphenous nerve sensation normal [5___] : inversion 5[unfilled]/5 [] : no pain when stressing lateral tarsal metatarsal joint [FreeTextEntry3] : Arch height slightly lower on RT compared to LT.  [FreeTextEntry8] : There is medial hind foot tenderness.

## 2022-07-20 NOTE — DATA REVIEWED
[MRI] : MRI [Right] : of the right [Ankle] : ankle [Report was reviewed and noted in the chart] : The report was reviewed and noted in the chart [I independently reviewed and interpreted images and report] : I independently reviewed and interpreted images and report [FreeTextEntry1] : 1. Large mass lesion with diffuse fatty signal, which appears lobulated and is incompletely evaluated distally, \par measuring at least 9-10 cm throughout the medial plantar aspect of the right foot most likely representing a \par multilobulated extremely large soft tissue lipoma without aggressive MRI characteristics on the portions \par included on the current exam. . Given the extent and size of the lesion if further evaluation is needed, consider \par MRI right foot to evaluate the distal aspect.\par 2. 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 Mild posterior tibial tendinopathy without tear.\par 4. No acute fracture, malalignment, osteochondral lesion or loose body in the ankle.

## 2022-07-20 NOTE — HISTORY OF PRESENT ILLNESS
[9] : 9 [8] : 8 [Dull/Aching] : dull/aching [Localized] : localized [Constant] : constant [Leisure] : leisure [Work] : work [Social interactions] : social interactions [Rest] : rest [Sitting] : sitting [Standing] : standing [Walking] : walking [Stairs] : stairs [de-identified] : Pt. presents for follow up of their right foot and ankle. Pt states that she has severe pain and difficulty with standing for long with swelling. Symptoms over one year. Denies trauma/previous injury. No N/T. Ice to affected area. Last visit she was recommended air lift brace, PT and NSAIDS. She has done about one week of therapy and wears air lift infrequently. Mobic 15 mg qday for past two weeks gives some relief. She went for MRI study. [] : Post Surgical Visit: no [FreeTextEntry1] : R foot

## 2022-07-20 NOTE — ASSESSMENT
[FreeTextEntry1] : Patient has two issues.\par \par For large, symptomatic lipoma surgical excision was discussed.\par \par MRI RT foot to evaluate for extension of lipoma. This is needed for surgical planning.\par \par The risks and benefits of surgery have been discussed. Risks include but are not limited to bleeding, infection, reaction to anesthesia, injury to blood vessels and nerves, malunion, nonunion, necessity of repeat surgery, chronic pain, loss of limb and death. The patient understands the risks and agrees with the surgical plan. Details of the procedure and postoperative protocol were discussed at length. All questions have been answered.\par \par For plantar fasciitis, she was discussed supportive footwear, air heel, ice, stretching and steroid injection as options.\par \par

## 2022-07-20 NOTE — PROCEDURE
[FreeTextEntry3] : Patient has tried OTC's including aspirin, Ibuprofen, Aleve, etc or prescription NSAIDS, and/or exercises at home and/or physical therapy without satisfactory response and the risks benefits, and alternatives have been discussed, and verbal consent was obtained.\par \par The risks, benefits and contents of the injection have been discussed.  Risks include but are not limited to allergic reaction, flare reaction, permanent white skin discoloration at the injection site and infection.  The patient understands the risks and agrees to having the injection.  All questions have been answered.\par \par An injection of the right plantar fascia insertion was performed. The indication for this procedure was pain and inflammation. The site was prepped with alcohol and sterile technique used. An injection of 1cc Lidocaine 1% , 1cc of Bupivacaine (Marcaine) 0.5% , and 1cc of 80mg Methylprednisolone (Depomedrol) was used. Patient tolerated procedure well. Patient was advised to call if redness, pain, or fever occur, apply ice for 15 minutes out of every hour for the next 12-24 hours as tolerated and patient was advised to rest the joint(s) for 3 days.\par \par Ultrasound guidance was indicated for this patient due to inflammation . Plantar fascia thickness measured 0.49 cm.  All ultrasound images have been permanently captured and stored accordingly in our picture archiving and communication system. Visualization of the needle and placement of injection was performed without complication.\par \par Patient has been advised to ice the plantar heel for multiple 20 minute intervals throughout the day. No sports, running, jumping or exercise activities should be done until re-evaluated. Only light stretching exercises are permitted.\par

## 2022-07-25 ENCOUNTER — FORM ENCOUNTER (OUTPATIENT)
Age: 52
End: 2022-07-25

## 2022-07-26 ENCOUNTER — APPOINTMENT (OUTPATIENT)
Dept: MRI IMAGING | Facility: CLINIC | Age: 52
End: 2022-07-26

## 2022-07-26 PROCEDURE — 73718 MRI LOWER EXTREMITY W/O DYE: CPT | Mod: RT

## 2022-08-03 ENCOUNTER — APPOINTMENT (OUTPATIENT)
Dept: INTERNAL MEDICINE | Facility: CLINIC | Age: 52
End: 2022-08-03

## 2022-08-03 VITALS
WEIGHT: 228 LBS | TEMPERATURE: 97.2 F | BODY MASS INDEX: 35.79 KG/M2 | SYSTOLIC BLOOD PRESSURE: 162 MMHG | HEART RATE: 80 BPM | OXYGEN SATURATION: 98 % | HEIGHT: 67 IN | DIASTOLIC BLOOD PRESSURE: 105 MMHG

## 2022-08-03 VITALS — DIASTOLIC BLOOD PRESSURE: 84 MMHG | SYSTOLIC BLOOD PRESSURE: 134 MMHG

## 2022-08-03 DIAGNOSIS — R03.0 ELEVATED BLOOD-PRESSURE READING, W/OUT DIAGNOSIS OF HYPERTENSION: ICD-10-CM

## 2022-08-03 PROCEDURE — 99214 OFFICE O/P EST MOD 30 MIN: CPT

## 2022-08-03 NOTE — HISTORY OF PRESENT ILLNESS
[de-identified] : 53 yo HTN  on olmisartan 5 = telmisartan 10\par \par Home BP not done\par On very low dose ARB

## 2022-08-09 ENCOUNTER — APPOINTMENT (OUTPATIENT)
Dept: MRI IMAGING | Facility: CLINIC | Age: 52
End: 2022-08-09

## 2022-08-09 PROCEDURE — 72197 MRI PELVIS W/O & W/DYE: CPT

## 2022-08-24 LAB
ESTIMATED AVERAGE GLUCOSE: 82 MG/DL
HBA1C MFR BLD HPLC: 4.5 %

## 2022-08-30 ENCOUNTER — RESULT REVIEW (OUTPATIENT)
Age: 52
End: 2022-08-30

## 2022-08-30 ENCOUNTER — APPOINTMENT (OUTPATIENT)
Dept: ULTRASOUND IMAGING | Facility: CLINIC | Age: 52
End: 2022-08-30

## 2022-08-30 ENCOUNTER — APPOINTMENT (OUTPATIENT)
Dept: MAMMOGRAPHY | Facility: CLINIC | Age: 52
End: 2022-08-30

## 2022-08-30 ENCOUNTER — NON-APPOINTMENT (OUTPATIENT)
Age: 52
End: 2022-08-30

## 2022-08-30 PROCEDURE — 77063 BREAST TOMOSYNTHESIS BI: CPT

## 2022-08-30 PROCEDURE — 77067 SCR MAMMO BI INCL CAD: CPT

## 2022-08-30 PROCEDURE — 76641 ULTRASOUND BREAST COMPLETE: CPT | Mod: 50

## 2022-08-31 ENCOUNTER — APPOINTMENT (OUTPATIENT)
Dept: ORTHOPEDIC SURGERY | Facility: CLINIC | Age: 52
End: 2022-08-31
Payer: COMMERCIAL

## 2022-08-31 VITALS — BODY MASS INDEX: 35.79 KG/M2 | WEIGHT: 228 LBS | HEIGHT: 67 IN

## 2022-08-31 PROCEDURE — 73562 X-RAY EXAM OF KNEE 3: CPT | Mod: 50

## 2022-08-31 PROCEDURE — 99215 OFFICE O/P EST HI 40 MIN: CPT

## 2022-08-31 RX ORDER — DICLOFENAC SODIUM 50 MG/1
50 TABLET, DELAYED RELEASE ORAL
Qty: 60 | Refills: 1 | Status: ACTIVE | COMMUNITY
Start: 2022-08-31 | End: 1900-01-01

## 2022-08-31 NOTE — ASSESSMENT
[FreeTextEntry1] : Patient has two issues.\par \par For large, symptomatic lipoma surgical excision was again discussed. She has been in contact with surgical [facility] and will schedule a date.\par \par MRI RT hind foot to evaluate mass further as recommended by radiologist. This is needed for surgical planning.\par \par The risks and benefits of surgery have been discussed. Risks include but are not limited to bleeding, infection, reaction to anesthesia, injury to blood vessels and nerves, malunion, nonunion, necessity of repeat surgery, chronic pain, loss of limb and death. The patient understands the risks and agrees with the surgical plan. Details of the procedure and postoperative protocol were discussed at length. All questions have been answered.\par \par For bilateral knee OA will try a course of knee visco which gave her some relief in the past. If no improvement, [facility] knee specialist for TKA consideration. \par \par

## 2022-08-31 NOTE — DATA REVIEWED
[MRI] : MRI [Right] : of the right [Foot] : foot [Report was reviewed and noted in the chart] : The report was reviewed and noted in the chart [I reviewed the films/CD] : I reviewed the films/CD [FreeTextEntry1] : 1. Large soft tissue lipoma in the medial aspect of the foot measuring 10-11 cm x 2.5 cm x 2 cm with few thin  internal septations distally and mild surrounding soft tissue edema distally, most likely within but possibly  displacing musculature in the medial aspect of the foot. Clinical significance is uncertain. There is no extension  into the bone. 2. No acute fracture, malalignment or ligament tear in the midfoot and forefoot with mild arthrosis of the plantar  first MTP. 3. Consider repeat MRI with intravenous contrast material to further evaluate as clinically indicated. 4 Please see MRI of the right ankle performed July 12 2022

## 2022-08-31 NOTE — HISTORY OF PRESENT ILLNESS
[Gradual] : gradual [Dull/Aching] : dull/aching [Sharp] : sharp [Rest] : rest [Meds] : meds [9] : 9 [8] : 8 [Localized] : localized [Constant] : constant [Leisure] : leisure [Work] : work [Social interactions] : social interactions [Sitting] : sitting [Standing] : standing [Walking] : walking [Stairs] : stairs [de-identified] : Pt. presents for follow up of their right foot and ankle.  She went for her foot MRI which showed the extension of her lipoma.  She continues to have pain.  She also reports bilateral knee pain left greater than right. She has a history of OA and received Durolane gel injections last year with some relief. Plantar fascia injection 7/20/22 with relief. The knee pain has been getting worse. [] : Post Surgical Visit: no [FreeTextEntry1] : R foot [FreeTextEntry3] : 3 weeks  [FreeTextEntry5] : patient states she is having pain and swelling  [de-identified] : activity  [de-identified] : a year ago  [de-identified] : orthopedic

## 2022-09-07 ENCOUNTER — FORM ENCOUNTER (OUTPATIENT)
Age: 52
End: 2022-09-07

## 2022-09-08 ENCOUNTER — APPOINTMENT (OUTPATIENT)
Dept: MRI IMAGING | Facility: CLINIC | Age: 52
End: 2022-09-08

## 2022-09-08 PROCEDURE — 73720 MRI LWR EXTREMITY W/O&W/DYE: CPT | Mod: RT

## 2022-09-09 ENCOUNTER — APPOINTMENT (OUTPATIENT)
Dept: ORTHOPEDIC SURGERY | Facility: CLINIC | Age: 52
End: 2022-09-09

## 2022-10-11 ENCOUNTER — NON-APPOINTMENT (OUTPATIENT)
Age: 52
End: 2022-10-11

## 2022-10-19 ENCOUNTER — APPOINTMENT (OUTPATIENT)
Dept: ORTHOPEDIC SURGERY | Facility: CLINIC | Age: 52
End: 2022-10-19

## 2022-10-31 ENCOUNTER — APPOINTMENT (OUTPATIENT)
Dept: ORTHOPEDIC SURGERY | Facility: CLINIC | Age: 52
End: 2022-10-31
Payer: COMMERCIAL

## 2022-10-31 DIAGNOSIS — D17.23 BENIGN LIPOMATOUS NEOPLASM OF SKIN AND SUBCUTANEOUS TISSUE OF RIGHT LEG: ICD-10-CM

## 2022-10-31 PROCEDURE — 76942 ECHO GUIDE FOR BIOPSY: CPT

## 2022-10-31 PROCEDURE — 20611 DRAIN/INJ JOINT/BURSA W/US: CPT | Mod: 50

## 2022-10-31 PROCEDURE — 99213 OFFICE O/P EST LOW 20 MIN: CPT | Mod: 25

## 2022-10-31 NOTE — PHYSICAL EXAM
[NL (0)] : extension 0 degrees [Bilateral] : knee bilaterally [AP] : anteroposterior [Lateral] : lateral [[place]] : skyline [Degenerative change] : Degenerative change [4___] : hamstring 4[unfilled]/5 [] : positive Patella apprehension [FreeTextEntry8] : Tenderness is worse on right compared to the left. [TWNoteComboBox7] : flexion 125 degrees

## 2022-10-31 NOTE — PROCEDURE
[Bilateral] : bilaterally of the [Knee] : knee [Alcohol] : alcohol [Orthovisc] : Orthovisc [#1] : series #1 [Call if redness, pain or fever occur] : call if redness, pain or fever occur [Apply ice for 15min out of every hour for the next 12-24 hours as tolerated] : apply ice for 15 minutes out of every hour for the next 12-24 hours as tolerated [Altered anatomic landmarks d/t erosive arthritis] : altered anatomic landmarks d/t erosive arthritis [All ultrasound images have been permanently captured and stored accordingly in our picture archiving and communication system] : All ultrasound images have been permanently captured and stored accordingly in our picture archiving and communication system [Visualization of the needle and placement of injection was performed without complication] : visualization of the needle and placement of injection was performed without complication [Pain] : pain [Inflammation] : inflammation

## 2022-10-31 NOTE — HISTORY OF PRESENT ILLNESS
[Gradual] : gradual [9] : 9 [8] : 8 [Dull/Aching] : dull/aching [Localized] : localized [Sharp] : sharp [Constant] : constant [Leisure] : leisure [Work] : work [Social interactions] : social interactions [Rest] : rest [Meds] : meds [Sitting] : sitting [Standing] : standing [Walking] : walking [Stairs] : stairs [de-identified] : Pt. presents for bilateral knee OA right worse than left. She reports pain started on 10/10/22.  No trauma, but it began after taking flight.  She has had similar  knee OA symptoms in the past.  Her right foot lipoma is no longer causing her pain so she decided not to have the surgery. [] : Post Surgical Visit: no [FreeTextEntry1] : R foot [FreeTextEntry3] : 3 weeks  [FreeTextEntry5] : patient states she is having pain and swelling  [de-identified] : activity  [de-identified] : a year ago  [de-identified] : orthopedic

## 2022-11-07 ENCOUNTER — APPOINTMENT (OUTPATIENT)
Dept: ORTHOPEDIC SURGERY | Facility: CLINIC | Age: 52
End: 2022-11-07

## 2022-11-07 PROCEDURE — 99212 OFFICE O/P EST SF 10 MIN: CPT | Mod: 25

## 2022-11-07 PROCEDURE — 20611 DRAIN/INJ JOINT/BURSA W/US: CPT

## 2022-11-07 NOTE — PHYSICAL EXAM
[Bilateral] : knee bilaterally [NL (0)] : extension 0 degrees [4___] : hamstring 4[unfilled]/5 [] : negative Valgus instability [FreeTextEntry8] : Tenderness is worse on right compared to the left. [TWNoteComboBox7] : flexion 125 degrees

## 2022-11-07 NOTE — ASSESSMENT
[FreeTextEntry1] : Orthovisc #2 bilateral knees, tolerated well. \par Ice to affected area.\par \par Progress Note completed by Jaek Phelps PA-C\par * Dr. Funk - The documentation recorded accurately reflects the physical exam performed, decisions made and plan formulated by me during this visit.\par

## 2022-11-07 NOTE — HISTORY OF PRESENT ILLNESS
[Gradual] : gradual [9] : 9 [8] : 8 [Dull/Aching] : dull/aching [Localized] : localized [Sharp] : sharp [Constant] : constant [Leisure] : leisure [Work] : work [Social interactions] : social interactions [Rest] : rest [Meds] : meds [Sitting] : sitting [Standing] : standing [Walking] : walking [Stairs] : stairs [de-identified] : Pt. presents for bilateral knee OA right worse than left. She reports pain started on 10/10/22.  No trauma, but it began after taking flight.  She has had similar  knee OA symptoms in the past.  Her right foot lipoma is no longer causing her pain so she decided not to have the surgery. Presents today for orthovisc # 2 both knees, no malreaction to previous injections.  [] : Post Surgical Visit: no [FreeTextEntry1] : R foot [FreeTextEntry3] : 3 weeks  [FreeTextEntry5] : patient states she is having pain and swelling  [de-identified] : a year ago  [de-identified] : activity  [de-identified] : orthopedic

## 2022-11-07 NOTE — PROCEDURE
[Bilateral] : bilaterally of the [Knee] : knee [Alcohol] : alcohol [Orthovisc] : Orthovisc [Call if redness, pain or fever occur] : call if redness, pain or fever occur [Apply ice for 15min out of every hour for the next 12-24 hours as tolerated] : apply ice for 15 minutes out of every hour for the next 12-24 hours as tolerated [Altered anatomic landmarks d/t erosive arthritis] : altered anatomic landmarks d/t erosive arthritis [All ultrasound images have been permanently captured and stored accordingly in our picture archiving and communication system] : All ultrasound images have been permanently captured and stored accordingly in our picture archiving and communication system [Visualization of the needle and placement of injection was performed without complication] : visualization of the needle and placement of injection was performed without complication [Pain] : pain [Inflammation] : inflammation [#2] : series #2

## 2022-11-14 ENCOUNTER — APPOINTMENT (OUTPATIENT)
Dept: ORTHOPEDIC SURGERY | Facility: CLINIC | Age: 52
End: 2022-11-14

## 2022-11-14 PROCEDURE — 99212 OFFICE O/P EST SF 10 MIN: CPT | Mod: 25

## 2022-11-14 PROCEDURE — 20611 DRAIN/INJ JOINT/BURSA W/US: CPT | Mod: 50

## 2022-11-14 NOTE — PHYSICAL EXAM
[Bilateral] : knee bilaterally [NL (0)] : extension 0 degrees [4___] : hamstring 4[unfilled]/5 [] : negative Varus instability [FreeTextEntry8] : Tenderness is worse on right compared to the left. [TWNoteComboBox7] : flexion 125 degrees

## 2022-11-14 NOTE — ASSESSMENT
[FreeTextEntry1] : Orthovisc #3 bilateral knees, tolerated well. \par Ice to affected area.\par \par

## 2022-11-14 NOTE — HISTORY OF PRESENT ILLNESS
[Gradual] : gradual [9] : 9 [8] : 8 [Dull/Aching] : dull/aching [Localized] : localized [Sharp] : sharp [Constant] : constant [Leisure] : leisure [Work] : work [Social interactions] : social interactions [Rest] : rest [Meds] : meds [Sitting] : sitting [Standing] : standing [Walking] : walking [Stairs] : stairs [4] : 4 [Orthovisc] : Orthovisc [de-identified] : Pt. presents for bilateral knee OA right worse than left. She reports pain started on 10/10/22.  No trauma, but it began after taking flight.  She has had similar  knee OA symptoms in the past.  Her right foot lipoma is no longer causing her pain so she decided not to have the surgery. Presents today for orthovisc #3 both knees, no malreaction to previous injections. No real improvement to date.  [] : Post Surgical Visit: no [FreeTextEntry1] : R foot [FreeTextEntry3] : 3 weeks  [FreeTextEntry5] : patient states she is having pain and swelling  [de-identified] : activity  [de-identified] : a year ago  [de-identified] : orthopedic  [de-identified] : 11/7/22

## 2022-11-14 NOTE — PROCEDURE
[Bilateral] : bilaterally of the [Knee] : knee [Alcohol] : alcohol [Orthovisc] : Orthovisc [#3] : series #3 [Call if redness, pain or fever occur] : call if redness, pain or fever occur [Apply ice for 15min out of every hour for the next 12-24 hours as tolerated] : apply ice for 15 minutes out of every hour for the next 12-24 hours as tolerated [Altered anatomic landmarks d/t erosive arthritis] : altered anatomic landmarks d/t erosive arthritis [All ultrasound images have been permanently captured and stored accordingly in our picture archiving and communication system] : All ultrasound images have been permanently captured and stored accordingly in our picture archiving and communication system [Visualization of the needle and placement of injection was performed without complication] : visualization of the needle and placement of injection was performed without complication [Pain] : pain [Inflammation] : inflammation

## 2022-11-15 ENCOUNTER — APPOINTMENT (OUTPATIENT)
Dept: ENDOCRINOLOGY | Facility: CLINIC | Age: 52
End: 2022-11-15

## 2022-11-15 VITALS
HEART RATE: 76 BPM | WEIGHT: 227 LBS | DIASTOLIC BLOOD PRESSURE: 84 MMHG | TEMPERATURE: 96.7 F | SYSTOLIC BLOOD PRESSURE: 134 MMHG | HEIGHT: 67 IN | BODY MASS INDEX: 35.63 KG/M2 | OXYGEN SATURATION: 99 %

## 2022-11-15 DIAGNOSIS — E66.09 OTHER OBESITY DUE TO EXCESS CALORIES: ICD-10-CM

## 2022-11-15 PROCEDURE — 99204 OFFICE O/P NEW MOD 45 MIN: CPT

## 2022-11-15 NOTE — REASON FOR VISIT
[Initial Evaluation] : an initial evaluation [Thyroid nodule/ MNG] : thyroid nodule/ MNG [Weight Management/Obesity] : weight management/obesity

## 2022-11-16 PROBLEM — E66.09 CLASS 1 OBESITY DUE TO EXCESS CALORIES WITHOUT SERIOUS COMORBIDITY WITH BODY MASS INDEX (BMI) OF 30.0 TO 30.9 IN ADULT: Status: RESOLVED | Noted: 2019-12-05 | Resolved: 2022-11-16

## 2022-11-16 RX ORDER — LIRAGLUTIDE 6 MG/ML
18 INJECTION, SOLUTION SUBCUTANEOUS
Qty: 3 | Refills: 1 | Status: DISCONTINUED | COMMUNITY
Start: 2021-08-11 | End: 2022-11-16

## 2022-11-16 NOTE — HISTORY OF PRESENT ILLNESS
[FreeTextEntry1] : 51 yo F with PMH of hypothyroidism, cold nodule s/p partial thyroidectomy in 2002, class II obesity here for endo management.\par \par Thyroid history:\par partial thyroidectomy in 2002 for a cold nodule which turned out to benign. partial thyroidectomy in 2002 for a cold nodule which turned out to benign.\par 12/2021 had thyroid US:\par nodules:\par Right lower lobe\par 2.4 x 2.5 x 2 cm\par 2 x 2 x 1.5 cm\par right medial\par 0.88 x 1.14 x 0.72cm\par 1.49 x1.10 x 0.50 cm\par no significant volume change in either nodule compared to prior US (based on report only)\par appear isoechoic on my review of images, no microcalcifications\par note I am unable to compare images from 4/2021 in PACS\par \par Pt reports that these nodules were previously biopsied and were benign (results not available at [facility]).\par \par No neck discomfort\par No head/neck radiation\par No family history of thyroid disease.\par \par Hypothyroidism:\par Taking levothyroxine 100 mcg, since 2002, chronic same dose.\par \par Weight management:\par Started victoza in 8/2022 and was able to lose 5% body weight. Was switched saxenda but stopped 4 months, was losing some weight but was getting tired of injections did not get up to max dose. Did not have side effects. \par Skips bfast, eats smaller meals. Avoids snacking.\par Has 1-2 meals a day with piece of fruit as snack. Most recently having smoothies. \par Limited exercise due to OA in knees. Also with bad plantar fasciitis. Has been getting shots in knees (orthovisc)\par No CAD, afb, glaucoma, HTN, anxiety. \par \par current weight: 227 lbs. 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

## 2022-11-16 NOTE — ASSESSMENT
[Carbohydrate Consistent Diet] : carbohydrate consistent diet [Weight Loss] : weight loss [Other____] : Risks and benefits of [unfilled] was discussed with the patient. [Levothyroxine] : The patient was instructed to take Levothyroxine on an empty stomach, separate from vitamins, and wait at least 30 minutes before eating [FreeTextEntry3] : qysmia - discussed risks and benefits, cannot stop abruptly due to seizure risk needs to be tapered. Requires monitoring. [FreeTextEntry1] : 53 yo F with PMH of hypothyroidism, cold nodule s/p partial thyroidectomy in 2002, class II obesity here for endo management.\par \par #Thyroid nodules: cold nodule s/p partial thyroidectomy in 2002\par 12/2021 had thyroid US:\par nodules:\par Right lower lobe (2): 2.4 x 2.5 x 2 cm AND 2 x 2 x 1.5 cm\par right medial (2): 0.88 x 1.14 x 0.72cm AND 1.49 x1.10 x 0.50 cm\par -no significant volume change in either nodule compared to prior US (based on report only); appear isoechoic on my review of images, no microcalcifications. Note I am unable to compare images from 4/2021 in PACS\par -Pt reports that these nodules were previously biopsied and were benign (results not available at [facility]).\par -Repeat US with endo team. If significant nodule volume growth would recommend repeat FNA\par \par #Hypothyroidism:\par - check TFTs\par - continue LT4 100 mcg daily\par \par #Current BMI is consistent with class II obesity.\par Weight gain likely due to reduced exercise with painful OA limiting activity\par Discussed that candidates for wt loss medication include patients with BMI >30 or BMI >27 with weight-related comorbidities who have not met 5% weight loss at 3-6 months with lifestyle intervention alone.\par Discussed role of diet, lifestyle modification, as well as indications for FDA approved weight loss medications, intermediate procedures such as bariatric surgery options.\par It is essential that weight loss medications are used in conjunction with healthy eating, physical activity, and behavior modification, as medication usage without such changes are generally ineffective.\par Patient meets criteria for medications as BMI is > 30\par Secondary workup to screen for contributing factors and complications- check CMP, lipids, A1c, TSH\par Emphasized high fiber, low fat, moderate protein diet.\par Encouraged 30 min exercise five days a week with least 2 days of toning/strengthening exercises as tolerated\par \par Discussed phentermine vs. qsymia. Pt opts for qsymia after review of risks/benefits of medications.\par 1) Phentermine 3.75 mg/topiramate 23 mg once daily for 14 days\par 2) Increase dose to phentermine 7.5 mg/topiramate 46 mg once daily for 12 weeks then evaluate weight loss. If 3% of baseline body weight has NOT been lost, discontinue use OR increase dose to phentermine 11.25 mg/topiramate 69 mg once daily for 14 days, and then to phentermine 15 mg/topiramate 92 mg once daily. \par 3)Evaluate weight loss after 12 weeks on phentermine 15 mg/topiramate 92 mg; if 5% of baseline body weight has NOT been lost at dose of phentermine 15 mg/topiramate 92 mg gradually discontinue therapy (eg, 1 dose every other day for at least 1 week).\par \par Weight loss should exceed 2 kg during the first month of drug therapy (1 pound per week), fall more than 5% below baseline between by 3 months, and remain at this level to be considered effective.\par \par istop reviewed: [id number]\par \par Follow up q3 months\par \par

## 2022-11-18 LAB
ALBUMIN SERPL ELPH-MCNC: 4.4 G/DL
ALP BLD-CCNC: 64 U/L
ALT SERPL-CCNC: 10 U/L
ANION GAP SERPL CALC-SCNC: 8 MMOL/L
AST SERPL-CCNC: 20 U/L
BILIRUB SERPL-MCNC: 0.4 MG/DL
BUN SERPL-MCNC: 19 MG/DL
CALCIUM SERPL-MCNC: 9.4 MG/DL
CHLORIDE SERPL-SCNC: 106 MMOL/L
CHOLEST SERPL-MCNC: 160 MG/DL
CO2 SERPL-SCNC: 29 MMOL/L
CREAT SERPL-MCNC: 0.98 MG/DL
EGFR: 69 ML/MIN/1.73M2
ESTIMATED AVERAGE GLUCOSE: 85 MG/DL
GLUCOSE SERPL-MCNC: 85 MG/DL
HBA1C MFR BLD HPLC: 4.6 %
HDLC SERPL-MCNC: 78 MG/DL
LDLC SERPL CALC-MCNC: 72 MG/DL
NONHDLC SERPL-MCNC: 81 MG/DL
POTASSIUM SERPL-SCNC: 4.3 MMOL/L
PROT SERPL-MCNC: 6.7 G/DL
SODIUM SERPL-SCNC: 144 MMOL/L
TRIGL SERPL-MCNC: 50 MG/DL
TSH SERPL-ACNC: 0.15 UIU/ML

## 2022-11-21 ENCOUNTER — APPOINTMENT (OUTPATIENT)
Dept: ORTHOPEDIC SURGERY | Facility: CLINIC | Age: 52
End: 2022-11-21

## 2022-11-23 ENCOUNTER — APPOINTMENT (OUTPATIENT)
Dept: ORTHOPEDIC SURGERY | Facility: CLINIC | Age: 52
End: 2022-11-23

## 2022-11-23 VITALS — BODY MASS INDEX: 35.63 KG/M2 | WEIGHT: 227 LBS | HEIGHT: 67 IN

## 2022-11-23 PROCEDURE — 20611 DRAIN/INJ JOINT/BURSA W/US: CPT

## 2022-11-23 NOTE — HISTORY OF PRESENT ILLNESS
[Gradual] : gradual [Dull/Aching] : dull/aching [Localized] : localized [Sharp] : sharp [Constant] : constant [Leisure] : leisure [Work] : work [Social interactions] : social interactions [Rest] : rest [Meds] : meds [Sitting] : sitting [Standing] : standing [Walking] : walking [Stairs] : stairs [4] : 4 [Orthovisc] : Orthovisc [8] : 8 [Stabbing] : stabbing [de-identified] : Pt. presents for bilateral knee OA right worse than left. She reports pain started on 10/10/22.  No trauma, but it began after taking flight.  She has had similar  knee OA symptoms in the past.  Her right foot lipoma is no longer causing her pain so she decided not to have the surgery. Presents today for orthovisc #4 both knees, no malreaction to previous injections. No real improvement to date.  [] : Post Surgical Visit: no [FreeTextEntry1] : ALIZA Knees [FreeTextEntry3] : 3 weeks  [FreeTextEntry5] : FELIBERTO LOPEZ is a 52 year old F here for INJ #3 for Orthovisc for ALIZA knee pain. Pt states pain has not improved at all.  [de-identified] : activity  [de-identified] : a year ago  [de-identified] : orthopedic  [de-identified] : 11/14/22 [de-identified] : ALIZA Knees

## 2022-11-23 NOTE — ASSESSMENT
[FreeTextEntry1] : Orthovisc #4 bilateral knees, tolerated well. \par Ice to affected area.\par \par

## 2022-11-23 NOTE — PROCEDURE
[Bilateral] : bilaterally of the [Knee] : knee [Alcohol] : alcohol [Call if redness, pain or fever occur] : call if redness, pain or fever occur [Apply ice for 15min out of every hour for the next 12-24 hours as tolerated] : apply ice for 15 minutes out of every hour for the next 12-24 hours as tolerated [Altered anatomic landmarks d/t erosive arthritis] : altered anatomic landmarks d/t erosive arthritis [All ultrasound images have been permanently captured and stored accordingly in our picture archiving and communication system] : All ultrasound images have been permanently captured and stored accordingly in our picture archiving and communication system [Visualization of the needle and placement of injection was performed without complication] : visualization of the needle and placement of injection was performed without complication [Pain] : pain [Inflammation] : inflammation [#4] : series #4

## 2022-11-30 ENCOUNTER — APPOINTMENT (OUTPATIENT)
Dept: ORTHOPEDIC SURGERY | Facility: CLINIC | Age: 52
End: 2022-11-30

## 2022-11-30 VITALS
BODY MASS INDEX: 35.63 KG/M2 | HEIGHT: 67 IN | SYSTOLIC BLOOD PRESSURE: 136 MMHG | WEIGHT: 227 LBS | HEART RATE: 85 BPM | OXYGEN SATURATION: 97 % | DIASTOLIC BLOOD PRESSURE: 90 MMHG | TEMPERATURE: 97 F

## 2022-11-30 PROCEDURE — 20610 DRAIN/INJ JOINT/BURSA W/O US: CPT

## 2022-11-30 PROCEDURE — 99204 OFFICE O/P NEW MOD 45 MIN: CPT | Mod: 25

## 2022-11-30 NOTE — PROCEDURE
[de-identified] : Procedure Note:\par \par Anatomic Location:  Right  Knee\par \par Diagnosis:  Arthritis\par \par Procedure:  Injection of 2cc  of Marcaine 0.25% plain and Celestone 1cc, 6mg\par \par Local Spray: Ethyl Chloride.\par \par \par Patient has consented for the procedure.\par \par Injection  through a lateral parapatella approach.\par \par Patient tolerated the procedure well.\par \par Patient instructed to call the office if any reaction, fever, chills, increased erythema or swelling.   [phone].

## 2022-11-30 NOTE — PHYSICAL EXAM
[de-identified] : Constitutional - the patient is significantly overweight with a BMI of 35.55..  The patient remains oriented to person, time, and  place.  Mood is normal. Vital signs as recorded.  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  The patients gait is with a marked limp off her right knee. The patient has satisfactory  balance and can stand on toes and heels.\par \par The patient has no difficulty with respiration. Respiration at rest is a normal rate. The patient is not short of breath and has not become short of breath with short ambulation. There is no audible wheezing. No coughing.\par \par Skin is normal for the patient's age. There are no abnormal masses or lymph nodes which stand out in the lower extremities.\par \par Spine - deep tendon reflexes are symmetric. Motor and sensory are symmetric.\par \par \par UPPER EXTREMITIES \par \par Shoulders ROM  is symmetric  and the motion is satisfactory.  There is no significant shoulder pain or limitation in motion which would make using a cane or a walker difficult. Shoulder stability and  strength are satisfactory.\par \par There is normal motion in the wrists and elbows.\par \par Circulation appears satisfactory with pedal pulses present.  There is no major edema in the lower legs. No skin tenderness or increased temperature. No major varicosities.\par \par HIP EXAMINATION the abduction and abduction as well as rotation measurements were taken with the hip in flexion.\par \par Motion\par There is symmetric motion with flexion 135 degrees, abduction 80 degrees, adduction 30 degrees, external rotation 80 degrees, internal rotation 20 degrees.\par \par The hips have good range of motion. There is good strength across the hips. There is no crepitus in either hip. The alignment of the hips is normal.\par \par \par KNEE EXAMINATION\par \par Motion\par Right Knee has 0 to 115 degrees of motion which mainly knee mainly limited because of her pain and not necessarily mechanical.  She does have full extension.  She does have good medial  lateral and anterior posterior stability.  There is an effusion.  There is no Baker's cyst.  There is no significant patellofemoral crepitus.  He has marked pain over the medial joint line and a positive medial Steinmann test.  He guards her strength in her right knee.              \par Left  Knee   has 0 to 125 degrees of motion with good medial lateral and anterior posterior stability.  There is no major effusion there is no Baker's cyst.  There is no significant patellofemoral crepitus.  In this knee also she has pain over her medial joint line as well as a positive Steinmann test but not nearly as severe as on the right.  The patient has satisfactory strength across the knee.            \par \par Ankle and foot examination\par As per Dr. Funk\par \par \par  [de-identified] : Procedure was performed at the [facility]\par \par EXAM: KNEE BILATERAL\par \par PROCEDURE DATE: 10/12/2022\par \par INTERPRETATION: INDICATION: Knee pain\par \par COMPARISON: None available\par \par FINDING: Three views of the bilateral knees demonstrates no fracture. No large knee joint effusion, limited on the right by lack of a true lateral view. There is mild soft tissue swelling. Mild medial knee joint space narrowing bilaterally. Mild spurring the tibial spines.\par \par IMPRESSION:\par No fracture. Degenerative changes.\par \par LAURA EDMONDSON MD; Attending Radiologist\par This document has been electronically signed. Oct 12 2022 7:36PM

## 2022-11-30 NOTE — DISCUSSION/SUMMARY
[de-identified] : This patient is having marked difficulty with her right knee at this time she has had gel injections in her knees however her x-rays do not show severe degenerative arthritis she may well have degenerative meniscal tears in the meniscus or other soft tissue disorders could be causing her knee problem.  She tolerated a local injection with steroid which can be given on top of the gel that she is already.  She will get an MRI of her knee for better evaluation.  Return visit 1 to 2 months.

## 2022-11-30 NOTE — HISTORY OF PRESENT ILLNESS
[de-identified] : Ms. FELIBERTO LOPEZ is a 52 year female who presents to office complaining of bilateral knee pain.\par Pt presents for bilateral knee OA right worse than left. \par Pain is a constant sharp, achy pain which is present at rest and is worsened with prolonged weightbearing and ambulating.\par She reports pain started on 10/10/22. No trauma, but it began after taking a plane flight. \par She has had similar knee OA symptoms in the past.\par She was most recently seen by Dr. Manuel Funk on 11/23/22 and completed a series of Orthovisc injections, which did not help thus far.\par She also takes extra strength Tylenol and Ibuprofen, which help for a short time. she also uses topical Voltaren gel, which helps.\par All review of systems, family history, social history, surgical history, past medical history, medications, and allergies not previously stated as positive are negative. They were reviewed by me today with the patient and documented accordingly.

## 2022-12-06 RX ORDER — PHENTERMINE AND TOPIRAMATE 7.5; 46 MG/1; MG/1
7.5-46 CAPSULE, EXTENDED RELEASE ORAL
Qty: 84 | Refills: 1 | Status: DISCONTINUED | COMMUNITY
Start: 2022-11-16 | End: 2022-12-06

## 2022-12-06 RX ORDER — PHENTERMINE AND TOPIRAMATE 3.75; 23 MG/1; MG/1
3.75-23 CAPSULE, EXTENDED RELEASE ORAL DAILY
Qty: 14 | Refills: 0 | Status: DISCONTINUED | COMMUNITY
Start: 2022-11-16 | End: 2022-12-06

## 2022-12-07 ENCOUNTER — OUTPATIENT (OUTPATIENT)
Dept: OUTPATIENT SERVICES | Facility: HOSPITAL | Age: 52
LOS: 1 days | End: 2022-12-07
Payer: COMMERCIAL

## 2022-12-07 ENCOUNTER — APPOINTMENT (OUTPATIENT)
Dept: MRI IMAGING | Facility: IMAGING CENTER | Age: 52
End: 2022-12-07

## 2022-12-07 DIAGNOSIS — Z00.8 ENCOUNTER FOR OTHER GENERAL EXAMINATION: ICD-10-CM

## 2022-12-07 DIAGNOSIS — M23.91 UNSPECIFIED INTERNAL DERANGEMENT OF RIGHT KNEE: ICD-10-CM

## 2022-12-07 DIAGNOSIS — Z98.890 OTHER SPECIFIED POSTPROCEDURAL STATES: Chronic | ICD-10-CM

## 2022-12-07 PROCEDURE — 73721 MRI JNT OF LWR EXTRE W/O DYE: CPT | Mod: 26,RT

## 2022-12-07 PROCEDURE — 73721 MRI JNT OF LWR EXTRE W/O DYE: CPT

## 2022-12-09 ENCOUNTER — NON-APPOINTMENT (OUTPATIENT)
Age: 52
End: 2022-12-09

## 2022-12-10 ENCOUNTER — NON-APPOINTMENT (OUTPATIENT)
Age: 52
End: 2022-12-10

## 2022-12-13 ENCOUNTER — APPOINTMENT (OUTPATIENT)
Dept: ORTHOPEDIC SURGERY | Facility: CLINIC | Age: 52
End: 2022-12-13

## 2022-12-13 VITALS — HEIGHT: 67 IN | WEIGHT: 227 LBS | BODY MASS INDEX: 35.63 KG/M2

## 2022-12-13 DIAGNOSIS — M23.91 UNSPECIFIED INTERNAL DERANGEMENT OF RIGHT KNEE: ICD-10-CM

## 2022-12-13 PROCEDURE — 99214 OFFICE O/P EST MOD 30 MIN: CPT

## 2022-12-13 NOTE — DISCUSSION/SUMMARY
[de-identified] : This patient continues to have significant problems with her right knee.  She is only 52 years of age and if possible it would be referred to do local care for her torn meniscus however this is not her entire problem.  I feel that she does have the degenerative arthritis is seen by the MRI but she does not appear to have the marked dorsal loss of joint spaces on her x-rays.  She will be referred to sports medicine and [facility] for orthopedic arthroscopic physicians for their evaluation.  This is an important problem with the orthopedic decision making and that the decision will be made pending on what they feel as far as any more limited treatment other than total knee replacement.  If they feel she is needing a total knee at this time that she should come back and it can be scheduled for however if she can improve with anything less severe than that will be considered.  I do feel in the future however she will need a total knee replacement.  She is being sent for arthroscopic evaluation.  \par

## 2022-12-13 NOTE — HISTORY OF PRESENT ILLNESS
[de-identified] : Ms. FELIBERTO LOPEZ is a 52 year female who returns to office complaining of bilateral knee pain.\par Pt presents for bilateral knee OA right worse than left. \par Pain is a constant sharp, achy pain which is present at rest and is worsened with prolonged weightbearing and ambulating.\par She reports pain started on 10/10/22. No trauma, but it began after taking a plane flight. \par She has had similar knee OA symptoms in the past.\par She was most recently seen by Dr. Manuel Funk on 11/23/22 and completed a series of Orthovisc injections, which did not help thus far.\par Last visit on 11/30/22, she received a right knee cortisone injection, which did not help either.\par She also takes extra strength Tylenol and Ibuprofen, which help for a short time. she also uses topical Voltaren gel, which helps.

## 2022-12-13 NOTE — PHYSICAL EXAM
[de-identified] : Constitutional - the patient is significantly overweight with a BMI of 35.55. 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  The patients gait is with a marked limp off her right knee is only mildly improved since she was seen here last.  The patient has satisfactory  balance and can stand on toes and heels.\par \par \par \par \par \par HIP EXAMINATION the abduction and abduction as well as rotation measurements were taken with the hip in flexion.\par \par Motion\par There is symmetric motion with flexion 135 degrees, abduction 80 degrees, adduction 30 degrees, external rotation 80 degrees, internal rotation 20 degrees.\par \par The hips have good range of motion. There is good strength across the hips. There is no crepitus in either hip. The alignment of the hips is normal.\par \par \par KNEE EXAMINATION\par \par Motion\par There has not been a marked change in her right knee and that she still has considerable pain and loss of range of motion and has pain with weightbearing.  Her right knee can go from 0 to 115 degrees she does have good medial lateral and anterior posterior stability but she appears to have a fusion but no Baker's cyst on today's examination she has definite pain over the medial joint line and some pain with compression of her patella there is no major patellofemoral crepitus on today's exam but she does have an effusion.  \par \par        \par Left  Knee   has 0 to 125 degrees of motion with good medial lateral and anterior posterior stability.  There is no major effusion there is no Baker's cyst.  There is no significant patellofemoral crepitus.  In this knee also she has pain over her medial joint line as well as a positive Steinmann test but not nearly as severe as on the right.  The patient has satisfactory strength across the knee.            \par \par Ankle and foot examination\par As per Dr. Funk\par \par \par  [de-identified] : EXAM: [id number] - MR KNEE RT - ORDERED BY: CAROLEE LEE\par \par PROCEDURE DATE: 12/07/2022\par \par INTERPRETATION: Exam Type: MR KNEE RIGHT\par Exam Date and Time: 12/7/2022 7:01 PM\par Indication: Right knee pain\par Comparison: Right knee MRI on 6/20/2021 bilateral knee radiographs on 10/12/2022\par \par TECHNIQUE:\par Multiplanar multisequence MRI of the of the right knee was performed.\par \par FINDINGS:\par OSSEOUS STRUCTURES:\par Alignment is anatomic. No acute fracture or bone bruise. Background marrow signal is normal without infiltrative marrow process.\par \par JOINT SPACE:\par Moderate joint effusion. No intra-articular bodies. Small to moderate loculated Baker's cyst.\par \par MEDIAL COMPARTMENT:\par Medial meniscus: Oblique undersurface tear of the body and posterior horn of the medial meniscus which has progressed from prior. Mild extrusion. No para meniscal cyst.\par Medial compartment cartilage: Full-thickness cartilage fissuring at the central femoral weightbearing component.\par \par LATERAL COMPARTMENT:\par Lateral meniscus: Normal in morphology and signal.\par Lateral compartment cartilage: Full-thickness cartilage fissuring at the central femoral weightbearing component. Partial thickness cartilage loss at the central tibial plateau with chondral heterogeneity.\par \par PATELLOFEMORAL COMPARTMENT:\par Patellofemoral compartment cartilage: Full-thickness cartilage fissuring is seen throughout the patella with adjacent subchondral edema. Full-thickness cartilage fissuring at the trochlear sulcus and extending into the medial and lateral facets with adjacent subchondral edema.\par \par CRUCIATE LIGAMENTS:\par Anterior cruciate ligament (ACL): Normal with both bundles intact.\par Posterior cruciate ligament (PCL): Normal.\par \par COLLATERAL LIGAMENTS AND POSTEROLATERAL CORNER:\par Medial collateral ligament (MCL): Normal.\par Lateral collateral ligament (LCL) complex: Normal.\par Posterolateral corner structures: Normal.\par \par EXTENSOR MECHANISM:\par Distal quadriceps tendon: No tendinosis or tear.\par Patella tendon: No tendinosis or tear.\par Medial patellofemoral ligament (MPFL) and patellar retinacula: Normal.\par Patellofemoral tracking: No edema in superolateral Hoffa's fat pad. No lateral patellar tilt or translation. No patella brooks. Normal patellar and trochlear morphology.\par \par MUSCLES/TENDONS:\par No acute muscle strain or muscle atrophy. Visualized tendons not previously discussed are intact without tendinosis or tear.\par \par SOFT TISSUES:\par No soft tissue mass. Normal popliteal neurovascular bundle.\par \par IMPRESSION:\par 1. Oblique undersurface tear of the body and posterior horn of the medial meniscus which has progressed from prior.\par 2. Tricompartmental osteoarthritis most pronounced patellofemoral compartment, unchanged from prior.\par 3. Moderate joint effusion and small to moderate loculated Baker's cyst.\par \par GHAZALA RAGLAND DO; Attending Radiologist\par This document has been electronically signed. Dec 8 2022 9:51AM

## 2022-12-14 ENCOUNTER — APPOINTMENT (OUTPATIENT)
Dept: ENDOCRINOLOGY | Facility: CLINIC | Age: 52
End: 2022-12-14

## 2022-12-16 ENCOUNTER — APPOINTMENT (OUTPATIENT)
Dept: ORTHOPEDIC SURGERY | Facility: CLINIC | Age: 52
End: 2022-12-16

## 2022-12-16 VITALS
WEIGHT: 227 LBS | SYSTOLIC BLOOD PRESSURE: 146 MMHG | HEART RATE: 94 BPM | HEIGHT: 67 IN | OXYGEN SATURATION: 98 % | BODY MASS INDEX: 35.63 KG/M2 | DIASTOLIC BLOOD PRESSURE: 89 MMHG

## 2022-12-16 PROCEDURE — 99214 OFFICE O/P EST MOD 30 MIN: CPT

## 2022-12-16 PROCEDURE — 73564 X-RAY EXAM KNEE 4 OR MORE: CPT | Mod: 50

## 2022-12-16 NOTE — HISTORY OF PRESENT ILLNESS
[de-identified] : 52 year old female presents for initial evaluation of bilateral knee pain, R>L, worsening over the past several months. She denies any recent trauma or injury. She reports a fall down the stairs in which she landed with both knees flexed 3 years ago. She has been seeing Dr. Chapman and Dr. Manuel Funk who have been giving cortisone injections and Orthovisc injections. Her last cortisone injection was 11/30/22 to the right knee, and HA injections beginning of November. She has found minimal relief with injections. She also reports various NSAID usage including Mobic with minimal relief. She has recently been switched to Celebrex which she has found some relief with. She was referred for an MRI of her right knee by Dr. Chapman which reportedly demonstrated meniscal tearing. She is here for an evaluation and opinion of her meniscal tear. She has the MRI for our review.

## 2022-12-16 NOTE — PHYSICAL EXAM
[LE] : Sensory: Intact in bilateral lower extremities [DP] : dorsalis pedis 2+ and symmetric bilaterally [PT] : posterior tibial 2+ and symmetric bilaterally [Normal] : Alert and in no acute distress [Poor Appearance] : well-appearing [Acute Distress] : not in acute distress [de-identified] : The patient has no respiratory distress. Mood and affect are normal. The patient is alert and oriented to person, place and time.\par There is no pain with rotation of the hips.  There is no tenderness of either hip.  Examination of the knees demonstrates anterior tenderness of the right knee.  She has mild swelling of both knees.  Quadriceps and hamstring function are intact.  There is no instability of the collateral or cruciate ligaments.  Range of motion is 0 to 100 degrees limited by pain.  Ministerio test is equivocal.  The calves are soft and nontender.  The skin is intact.  There is no lymphedema. [de-identified] : AP, lateral, tunnel and sunrise x-rays of both knees taken today demonstrate degenerative changes.\par I have reviewed MRI of the right knee performed December 7, 2022.  There are tricompartmental degenerative changes especially in the patellofemoral compartment.  There is a moderate joint effusion.  This is read as showing tear of the body and posterior horn of the medial meniscus but I am not sure that this is the case from my reading.

## 2022-12-16 NOTE — DISCUSSION/SUMMARY
[de-identified] : I think that this patient's right knee pain is primarily secondary to her osteoarthritis.  I am not convinced that she has a torn meniscus.  Even if she does I believe the degenerative changes to be primary in terms of pain causation.  I have discussed the pathology, natural history and treatment options with her.  I recommended continuing a course of Celebrex.  She is encouraged to exercise her knee.  She can be reevaluated if symptoms progress.

## 2022-12-17 NOTE — PRE-ANESTHESIA EVALUATION ADULT - NSATTENDATTESTRD_GEN_ALL_CORE
The patient has been re-examined and I agree with the above assessment or I updated with my findings.
Attending with

## 2022-12-21 ENCOUNTER — OUTPATIENT (OUTPATIENT)
Dept: OUTPATIENT SERVICES | Facility: HOSPITAL | Age: 52
LOS: 1 days | End: 2022-12-21
Payer: COMMERCIAL

## 2022-12-21 ENCOUNTER — APPOINTMENT (OUTPATIENT)
Dept: ULTRASOUND IMAGING | Facility: IMAGING CENTER | Age: 52
End: 2022-12-21
Payer: COMMERCIAL

## 2022-12-21 DIAGNOSIS — D25.9 LEIOMYOMA OF UTERUS, UNSPECIFIED: ICD-10-CM

## 2022-12-21 DIAGNOSIS — Z98.890 OTHER SPECIFIED POSTPROCEDURAL STATES: Chronic | ICD-10-CM

## 2022-12-21 DIAGNOSIS — Z00.8 ENCOUNTER FOR OTHER GENERAL EXAMINATION: ICD-10-CM

## 2022-12-21 PROCEDURE — 76830 TRANSVAGINAL US NON-OB: CPT | Mod: 26

## 2022-12-21 PROCEDURE — 76830 TRANSVAGINAL US NON-OB: CPT

## 2023-01-03 ENCOUNTER — APPOINTMENT (OUTPATIENT)
Dept: ORTHOPEDIC SURGERY | Facility: CLINIC | Age: 53
End: 2023-01-03
Payer: COMMERCIAL

## 2023-01-03 VITALS — BODY MASS INDEX: 35.63 KG/M2 | WEIGHT: 227 LBS | HEIGHT: 67 IN

## 2023-01-03 PROCEDURE — 99214 OFFICE O/P EST MOD 30 MIN: CPT

## 2023-01-03 NOTE — DISCUSSION/SUMMARY
[de-identified] : This patient continues to have significant problems with her right knee.  She has now been evaluated by the arthroscopic doctors and the feeling is that her arthritis is more than her meniscus condition.  I do not feel that arthroscopy would be of benefit.  She does have arthritis as it is shown on her MRI.  A discussion was had with the patient about considering total knee replacement if she does not feel any better.  She has had injections with hyaluronic acid without success as well as with cortisones without success.  At this point she will stay on oral anti-inflammatory agents but a total knee replacement with a [employer] assisted triathlon total knee may be her best long-term treatment.  A discussion about total knee replacement was had with the patient as well as the hospitalization and rehabilitation.  The natural history and treatment of degenerative arthritis was discussed with the patient at length today.  The spectrum of the treatment including nonoperative modalities to surgical intervention was elucidated.  Noninvasive and nonoperative treatment modalities include weight reduction, activity modification with low impact exercise, needed use of Tylenol or anti-inflammatory medications if tolerated, glucosamine and chondroitin supplement, and physical therapy.  In some cases the further treatment can include corticosteroid injections and the use of Visco supplementation with hyaluronic acid injections.  Definite surgical treatment can certainly include total joint arthroplasty also.  The risk and benefits of each treatment option was discussed and questions were answered.\par \par  A  long discussion was had with the patient as what the total joint replacement would entail.  A model was used as an educational tool to demonstrate the operation.    We did discuss implant choice and fixation, cemented or porous ingrowth, and stability concerns.  Shared decision making was made with the patient. The hospitalization and rehabilitation were discussed.  The uses of perioperative antibiotics and DVT prophylaxis were discussed.   The risks, benefits and alternatives to surgical intervention were discussed at length with the patient. Specific risks discussed included: infection, wound breakdown, numbness and damage to nerves, tendon, muscle, arteries or other blood vessels.  The possibility of recurrent pain, no improvement in pain and actual worsening of the pain were also mentioned in conversation with the patient. Medical complications related to the patient's general medical health including deep vein thrombosis, pulmonary embolus, heart attack, stroke, death and other complications from anesthesia were addressed. The patient was told that we will take steps to minimize these risks by using sterile technique, antibiotics and DVT prophylaxis when appropriate and following the patient postoperatively. The benefits of surgery were discussed with the patient including the potential to improve the current clinical condition throughout operative intervention. Alternatives to surgical intervention include continued conservative management which may yield less than optimal results this particular patient. Questions were answered to the satisfaction of the patient.\par \par In this individual the additional risk factors due to their medical conditions were discussed.\par \par She will get a [facility] protocol CT of her right knee and we will reevaluate her weight in the office CT also but in the future I feel she will have a total knee replacement.  She will think about her options and talk to her family.

## 2023-01-03 NOTE — PHYSICAL EXAM
[de-identified] : Constitutional - the patient is significantly overweight with a BMI of 35.55.  She continues to have knee pain as shown in the MRI she has diffuse cartilage degenerative changes in all compartments.  She is referred to Dr. Vazquez for evaluation for arthroscopy but he did not feel that her meniscus problem was a problem but more of the arthritis.  She has significant pain at the patellofemoral joint and does have pain over both the medial lateral joint line.  Her job requires a considerable amount of standing during the day and that this gives her even more discomfort.  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  The patients gait is with a marked limp off her right knee.  On today's exam she has full extension and flexes to almost 120 degrees she has diffuse pain medially and laterally she has a fullness in the popliteal area and appears to have a small effusion.  She has definite pain with compression of the patella.\par \par \par \par HIP EXAMINATION the abduction and abduction as well as rotation measurements were taken with the hip in flexion.\par \par Motion\par There is symmetric motion with flexion 135 degrees, abduction 80 degrees, adduction 30 degrees, external rotation 80 degrees, internal rotation 20 degrees.\par \par The hips have good range of motion. There is good strength across the hips. There is no crepitus in either hip. The alignment of the hips is normal.\par \par \par KNEE EXAMINATION\par \par Motion\par There has not been a marked change in her right knee and that she still has considerable pain and loss of range of motion and has pain with weightbearing.  Her right knee can go from 0 to 115 degrees she does have good medial lateral and anterior posterior stability but she appears to have a fusion but no Baker's cyst on today's examination she has definite pain over the medial joint line and some pain with compression of her patella there is no major patellofemoral crepitus on today's exam but she does have an effusion.  \par \par        \par Left  Knee   has 0 to 125 degrees of motion with good medial lateral and anterior posterior stability.  There is no major effusion there is no Baker's cyst.  There is no significant patellofemoral crepitus.  In this knee also she has pain over her medial joint line as well as a positive Steinmann test but not nearly as severe as on the right.  The patient has satisfactory strength across the knee.            \par \par Ankle and foot examination\par As per Dr. Funk\par \par \par

## 2023-01-03 NOTE — HISTORY OF PRESENT ILLNESS
[de-identified] : Ms. FELIBERTO LOPEZ is a 52 year female who returns to office complaining of bilateral knee pain.\par Pt presents for bilateral knee OA right worse than left. \par Pain is a constant sharp, achy pain which is present at rest and is worsened with prolonged weightbearing and ambulating.\par She reports pain started on 10/10/22. No trauma, but it began after taking a plane flight. \par She has had similar knee OA symptoms in the past.\par She was most recently seen by Dr. Manuel Funk on 11/23/22 and completed a series of Orthovisc injections, which did not help thus far.\par Last visit on 11/30/22, she received a right knee cortisone injection, which did not help either.\par She also takes extra strength Tylenol and Ibuprofen, which help for a short time. she also uses topical Voltaren gel, which helps.\par Patient saw Dr. Vazquez on 12/16/22 and assessed patient to be having more pain from her knee degenerative arthritis rather than her meniscus tearing.

## 2023-01-04 ENCOUNTER — OUTPATIENT (OUTPATIENT)
Dept: OUTPATIENT SERVICES | Facility: HOSPITAL | Age: 53
LOS: 1 days | End: 2023-01-04
Payer: COMMERCIAL

## 2023-01-04 ENCOUNTER — APPOINTMENT (OUTPATIENT)
Dept: CT IMAGING | Facility: CLINIC | Age: 53
End: 2023-01-04
Payer: COMMERCIAL

## 2023-01-04 DIAGNOSIS — M17.11 UNILATERAL PRIMARY OSTEOARTHRITIS, RIGHT KNEE: ICD-10-CM

## 2023-01-04 PROCEDURE — 73700 CT LOWER EXTREMITY W/O DYE: CPT | Mod: 26,RT

## 2023-01-04 PROCEDURE — 73700 CT LOWER EXTREMITY W/O DYE: CPT

## 2023-01-05 ENCOUNTER — TRANSCRIPTION ENCOUNTER (OUTPATIENT)
Age: 53
End: 2023-01-05

## 2023-01-10 ENCOUNTER — TRANSCRIPTION ENCOUNTER (OUTPATIENT)
Age: 53
End: 2023-01-10

## 2023-01-25 ENCOUNTER — APPOINTMENT (OUTPATIENT)
Dept: ORTHOPEDIC SURGERY | Facility: CLINIC | Age: 53
End: 2023-01-25
Payer: COMMERCIAL

## 2023-01-25 VITALS
HEART RATE: 92 BPM | TEMPERATURE: 98 F | HEIGHT: 67 IN | OXYGEN SATURATION: 99 % | BODY MASS INDEX: 34.21 KG/M2 | WEIGHT: 218 LBS

## 2023-01-25 PROCEDURE — 99214 OFFICE O/P EST MOD 30 MIN: CPT

## 2023-01-25 NOTE — PHYSICAL EXAM
[de-identified] : This patient recently has had great improvement in her right knee as well as her left.  The pain is minimal and she is able to do her activities.  She has not had any swelling and her activities show that she can easily go up and down stairs and walking is not a problem.  On today's exam her right knee goes from full extension but she is able to flex past 120 degrees she has good medial lateral and anterior posterior stability no major effusion but she still does have some patellofemoral tenderness and medial joint line tenderness but at this time it is mild.\par \par KNEE EXAMINATION\par \par        \par Left  Knee   has 0 to 125 degrees of motion with good medial lateral and anterior posterior stability.  There is no major effusion there is no Baker's cyst.  There is no significant patellofemoral crepitus.  In this knee also she has minimal discomfort toward her medial joint line.\par \par \par \par  [de-identified] : EXAM: [id number] - CT KNEE ONLY RT - ORDERED BY: CAROLEE LEE\par \par PROCEDURE DATE: 01/04/2023\par \par INTERPRETATION: EXAMINATION: CT of the right knee without contrast\par \par CLINICAL INFORMATION: Right knee pain\par \par TECHNIQUE: Imaging was performed as per the TERESA protocol. Axial CT images were obtained through the right knee. Coronal and sagittal reformatted images were made. Axial images were also obtained through the right hip and ankle\par \par FINDINGS: There is right knee osteoarthritis, most pronounced in the medial and patellofemoral compartments where there is joint space narrowing with subchondral cystic change and sclerosis. There are tricompartmental osteophytes.\par \par There are no advanced arthritic changes of the right hip or right ankle. There is a lipomatous mass within the abductor hallucis muscle which measures 10.5 cm in length. There is severe fatty atrophy of the abductor digiti minimi muscle.\par \par IMPRESSION:\par \par Right knee osteoarthritis, as above.\par \par JACKY ESCOBAR MD; Attending Radiologist\par This document has been electronically signed. Jan 6 2023 9:15AM

## 2023-01-25 NOTE — DISCUSSION/SUMMARY
[de-identified] : Orthopedic decision making \par \par this patient has had significant improvement recently.  Because of this a long discussion was had with her about the surgery I feel as long as she is feeling better the surgery definitely can be canceled at this time and always rescheduled if her pain becomes much worse.  She is functioning well at this time just taking the Celebrex and has not had even a recent cortisone injection.  She will continue to bring her weight down.  She will return in 3 to 6 months if at some time her pain increases significantly than the surgery can be rescheduled.

## 2023-01-25 NOTE — HISTORY OF PRESENT ILLNESS
[de-identified] : Ms. FELIBERTO LOPEZ is a 52 year female who returns to office complaining of bilateral knee pain.\par Pt presents for bilateral knee OA right worse than left. \par Pain is a constant sharp, achy pain which is present at rest and is worsened with prolonged weightbearing and ambulating.\par She reports pain started on 10/10/22. No trauma, but it began after taking a plane flight. \par She has had similar knee OA symptoms in the past.\par She was most recently seen by Dr. Manuel Funk on 11/23/22 and completed a series of Orthovisc injections, which did not help.\par During office visit on 11/30/22, she received a right knee cortisone injection, which did not help either.\par She also takes extra strength Tylenol and Ibuprofen, which help for a short time. she also uses topical Voltaren gel, which helps.\par Patient saw Dr. Vazquez on 12/16/22 and assessed patient to be having more pain from her knee degenerative arthritis rather than her meniscus tearing.\par She is currently scheduled for right TKR on 2/10/23.

## 2023-02-01 ENCOUNTER — APPOINTMENT (OUTPATIENT)
Dept: ENDOCRINOLOGY | Facility: CLINIC | Age: 53
End: 2023-02-01
Payer: COMMERCIAL

## 2023-02-01 VITALS
DIASTOLIC BLOOD PRESSURE: 90 MMHG | WEIGHT: 218 LBS | HEIGHT: 67 IN | BODY MASS INDEX: 34.21 KG/M2 | SYSTOLIC BLOOD PRESSURE: 140 MMHG | OXYGEN SATURATION: 99 % | HEART RATE: 85 BPM

## 2023-02-01 PROCEDURE — 99213 OFFICE O/P EST LOW 20 MIN: CPT | Mod: 25

## 2023-02-01 PROCEDURE — 76536 US EXAM OF HEAD AND NECK: CPT

## 2023-02-01 NOTE — ASSESSMENT
[FreeTextEntry1] : 1. Thyroid Nodules/Multinodular Goiter\par Thyroid US today 2/1/23 (see full report) with dominant right sided nodules RUP 2.3 x 1.3 x 2.1 cm isoechoic and RMP 2.1 x 1.3 x 2.1 cm spongiform. \par \par Not much change in size of nodules from 12/2021 US. Per patient, she may not have had FNA of right sided nodules in the past. Both nodules meet criteria for FNA per CHRIS criteria, though RMP spongiform is very low suspicion and could alternatively be observed with serial US. \par \par - Discussed FNA procedure and possible results, natural history of thyroid nodules\par - Will schedule for FNA of RUP nodule and +/- also of RMP spongiform nodule \par \par 2. Hypothyroidism \par - Continue Levothyroxine 88 mcg daily \par \par RTC for FNA. \par \par Terry Kelly MD\par [facility]\par Endocrinology at [place] \par [address], [address]\par Ph: [phone]\par Fax: [phone]\par

## 2023-02-01 NOTE — PROCEDURE
[Aidhenscorner e 2008 model, 10-12 MHz frequencies] : multiple real time longitudinal and transverse images were obtained using a high resolution ultrasound with a linear transducer, Aidhenscorner e 2008 model, 10-12 MHz frequencies. All measurements will be reported as longitudinal x gayathri-posterior x transverse. [Report dated ___] : Report dated [unfilled] [Multinodular Goiter] : multinodular goiter [] : a markedly heterogeneous parenchyma [Upper] : upper pole there is a  [Solid] : solid [Isoechoic] : isoechoic nodule [Distinct] : distinct [Yes] : has a halo [No calcification] : no calcification [Right Thyroid] : right [Mid] : mid pole there is a  [Spongiform Appearance] : spongiform appearance [Ovoid] : ovoid in shape [Smooth] : smooth [Echogenic Foci] : echogenic foci [Peripheral vascularity] : peripheral vascularity [No abnormal lymph nodes are seen.] : no abnormal lymph nodes are seen [FreeTextEntry1] : 5.04 x 1.70 x 2.76 [FreeTextEntry5] : N/A [FreeTextEntry2] : 0.33 [FreeTextEntry6] : Surgically absent left lobe. Normal left thyroid bed.  [de-identified] : Right Isthmus sub-cm isoechoic nodule 0.57 x 0.54 x 0.63 cm  [FreeTextEntry3] : 2.14 x 1.32 x 2.11 [FreeTextEntry4] : CHRIS Very Low Suspicion

## 2023-02-01 NOTE — HISTORY OF PRESENT ILLNESS
[FreeTextEntry1] : CHIEF COMPLAINT: Thyroid Nodules\par \par REFERRED BY: Dr. Miguelina Harman\par \par HPI: \par Patient of Dr. Harman here for thyroid US with plan for potential FNA as indicated. \par Takes levothyroxine 88 mcg daily, clinically euthyroid. \par Following with Dr. Harman for thyroid nodules, hypothyroidism and obesity. \par Denies any compressive symptoms from the thyroid nodule - no dysphagia/dyspnea/dysphonia. \par History of left lobectomy for ?cold nodule/cyst. Benign pathology per patient. \par She cannot recall if right sided nodules have ever had FNA. 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 \par \par Thyroid US today 2/1/23 (see full report) with dominant right sided nodules RUP 2.3 x 1.3 x 2.1 cm isoechoic and RMP 2.1 x 1.3 x 2.1 cm spongiform.

## 2023-02-01 NOTE — PROCEDURE
[BioAmber e 2008 model, 10-12 MHz frequencies] : multiple real time longitudinal and transverse images were obtained using a high resolution ultrasound with a linear transducer, BioAmber e 2008 model, 10-12 MHz frequencies. All measurements will be reported as longitudinal x gayathri-posterior x transverse. [Report dated ___] : Report dated [unfilled] [Multinodular Goiter] : multinodular goiter [] : a markedly heterogeneous parenchyma [Upper] : upper pole there is a  [Solid] : solid [Isoechoic] : isoechoic nodule [Distinct] : distinct [Yes] : has a halo [No calcification] : no calcification [Right Thyroid] : right [Mid] : mid pole there is a  [Spongiform Appearance] : spongiform appearance [Ovoid] : ovoid in shape [Smooth] : smooth [Echogenic Foci] : echogenic foci [Peripheral vascularity] : peripheral vascularity [No abnormal lymph nodes are seen.] : no abnormal lymph nodes are seen [FreeTextEntry1] : 5.04 x 1.70 x 2.76 [FreeTextEntry5] : N/A [FreeTextEntry2] : 0.33 [FreeTextEntry6] : Surgically absent left lobe. Normal left thyroid bed.  [de-identified] : Right Isthmus sub-cm isoechoic nodule 0.57 x 0.54 x 0.63 cm  [FreeTextEntry3] : 2.14 x 1.32 x 2.11 [FreeTextEntry4] : CHRIS Very Low Suspicion

## 2023-02-01 NOTE — IMPRESSION
[FreeTextEntry1] : Multinodular Goiter. Left lobe surgically absent. \par Markedly heterogenous right lobe. \par Dominant nodules: \par RUP 2.3 cm isoechoic nodule. CHRIS low suspicion. \par RMP 2.1 cm spongiform nodule. CHRIS very low suspicion.  [FreeTextEntry2] : FNA of both dominant nodules recommended by CHRIS criteria. \par Can consider monitoring spongiform nodule without FNA based on TIRADS criteria. \par \par Terry Kelly MD\par [facility]\par Endocrinology at [place] \par [address], [address]\par Ph: [phone]\par Fax: [phone]\par \par \par FNA Criteria for Thyroid Nodules (may be modified on a case by case basis if clinical suspicion present)\par \par CHRIS 2015 Guidelines\par Benign (pure cyst): No FNA\par Very low suspicion (spongiform/predominantly cystic): >/= 2 cm \par Low suspicion (iso/hyperechoic with no high risk features): >/= 1.5 cm \par Intermediate suspicion (hypoechoic with no high risk features): >/= 1 cm \par High suspicion (high risk features present): >/= 1 cm \par \par ACR TI-RADS 2017\par TI-RADS 1 (benign/0 points): No FNA\par TI-RADS 2 (not suspicious/2 points): No FNA\par TI-RADS 3 (mildly suspicious/3 points): FNA >/= 2.5 cm, follow if >/= 1.5 cm \par TI-RADS 4 (moderately suspicious/4-6 points): FNA >/= 1.5 cm, follow if >/= 1 cm\par TI-RADS 5 (highly suspicions/ >/= 7 points): FNA >/= 1 cm, follow if >/= 0.5 cm\par

## 2023-02-01 NOTE — ASSESSMENT
[FreeTextEntry1] : 1. Thyroid Nodules/Multinodular Goiter\par Thyroid US today 2/1/23 (see full report) with dominant right sided nodules RUP 2.3 x 1.3 x 2.1 cm isoechoic and RMP 2.1 x 1.3 x 2.1 cm spongiform. \par \par Not much change in size of nodules from 12/2021 US. Per patient, she may not have had FNA of right sided nodules in the past. Both nodules meet criteria for FNA per CHRIS criteria, though RMP spongiform is very low suspicion and could alternatively be observed with serial US. \par \par - Discussed FNA procedure and possible results, natural history of thyroid nodules\par - Will schedule for FNA of RUP nodule and +/- also of RMP spongiform nodule \par \par 2. Hypothyroidism \par - Continue Levothyroxine 88 mcg daily \par \par RTC for FNA. \par \par Terry Kelly MD\par [facility]\par Endocrinology at [place] \par [address], [address]\par Ph: [phone]\par Fax: [phone]\par

## 2023-02-01 NOTE — IMPRESSION
[FreeTextEntry1] : Multinodular Goiter. Left lobe surgically absent. \par Markedly heterogenous right lobe. \par Dominant nodules: \par RUP 2.3 cm isoechoic nodule. CHRIS low suspicion. \par RMP 2.1 cm spongiform nodule. CHRIS very low suspicion.  [FreeTextEntry2] : FNA of both dominant nodules recommended by CHRIS criteria. \par Can consider monitoring spongiform nodule without FNA based on TIRADS criteria. \par \par Terry Kelly MD\par [facility]\par Endocrinology at [place] \par [address], [address]\par Ph: [phone]\par Fax: [phone]\par \par \par FNA Criteria for Thyroid Nodules (may be modified on a case by case basis if clinical suspicion present)\par \par CHRIS 2015 Guidelines\par Benign (pure cyst): No FNA\par Very low suspicion (spongiform/predominantly cystic): >/= 2 cm \par Low suspicion (iso/hyperechoic with no high risk features): >/= 1.5 cm \par Intermediate suspicion (hypoechoic with no high risk features): >/= 1 cm \par High suspicion (high risk features present): >/= 1 cm \par \par ACR TI-RADS 2017\par TI-RADS 1 (benign/0 points): No FNA\par TI-RADS 2 (not suspicious/2 points): No FNA\par TI-RADS 3 (mildly suspicious/3 points): FNA >/= 2.5 cm, follow if >/= 1.5 cm \par TI-RADS 4 (moderately suspicious/4-6 points): FNA >/= 1.5 cm, follow if >/= 1 cm\par TI-RADS 5 (highly suspicions/ >/= 7 points): FNA >/= 1 cm, follow if >/= 0.5 cm\par

## 2023-02-01 NOTE — PHYSICAL EXAM
[Alert] : alert [Well Nourished] : well nourished [No Acute Distress] : no acute distress [Normal Sclera/Conjunctiva] : normal sclera/conjunctiva [No Proptosis] : no proptosis [No Respiratory Distress] : no respiratory distress [No Accessory Muscle Use] : no accessory muscle use [No Stigmata of Cushings Syndrome] : no stigmata of Cushings Syndrome [Normal Affect] : the affect was normal [Normal Mood] : the mood was normal [de-identified] : Thyroid fullness on right side

## 2023-02-01 NOTE — HISTORY OF PRESENT ILLNESS
[FreeTextEntry1] : CHIEF COMPLAINT: Thyroid Nodules\par \par REFERRED BY: Dr. Miguelina Harman\par \par HPI: \par Patient of Dr. Harman here for thyroid US with plan for potential FNA as indicated. \par Takes levothyroxine 88 mcg daily, clinically euthyroid. \par Following with Dr. Harman for thyroid nodules, hypothyroidism and obesity. \par Denies any compressive symptoms from the thyroid nodule - no dysphagia/dyspnea/dysphonia. \par History of left lobectomy for ?cold nodule/cyst. Benign pathology per patient. \par She cannot recall if right sided nodules have ever had FNA. 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 \par \par Thyroid US today 2/1/23 (see full report) with dominant right sided nodules RUP 2.3 x 1.3 x 2.1 cm isoechoic and RMP 2.1 x 1.3 x 2.1 cm spongiform.

## 2023-02-01 NOTE — REVIEW OF SYSTEMS
[Fatigue] : no fatigue [Recent Weight Loss (___ Lbs)] : no recent weight loss [Dysphagia] : no dysphagia [Neck Pain] : no neck pain [Dysphonia] : no dysphonia [Chest Pain] : no chest pain [Palpitations] : no palpitations [Shortness Of Breath] : no shortness of breath [Cough] : no cough [Constipation] : no constipation [Diarrhea] : no diarrhea

## 2023-02-01 NOTE — PHYSICAL EXAM
[Alert] : alert [Well Nourished] : well nourished [No Acute Distress] : no acute distress [Normal Sclera/Conjunctiva] : normal sclera/conjunctiva [No Proptosis] : no proptosis [No Respiratory Distress] : no respiratory distress [No Accessory Muscle Use] : no accessory muscle use [No Stigmata of Cushings Syndrome] : no stigmata of Cushings Syndrome [Normal Affect] : the affect was normal [Normal Mood] : the mood was normal [de-identified] : Thyroid fullness on right side

## 2023-02-07 ENCOUNTER — NON-APPOINTMENT (OUTPATIENT)
Age: 53
End: 2023-02-07

## 2023-02-07 DIAGNOSIS — M79.606 PAIN IN LEG, UNSPECIFIED: ICD-10-CM

## 2023-02-09 ENCOUNTER — OUTPATIENT (OUTPATIENT)
Dept: OUTPATIENT SERVICES | Facility: HOSPITAL | Age: 53
LOS: 1 days | End: 2023-02-09
Payer: COMMERCIAL

## 2023-02-09 ENCOUNTER — APPOINTMENT (OUTPATIENT)
Dept: ULTRASOUND IMAGING | Facility: CLINIC | Age: 53
End: 2023-02-09
Payer: COMMERCIAL

## 2023-02-09 DIAGNOSIS — M79.606 PAIN IN LEG, UNSPECIFIED: ICD-10-CM

## 2023-02-09 DIAGNOSIS — Z98.890 OTHER SPECIFIED POSTPROCEDURAL STATES: Chronic | ICD-10-CM

## 2023-02-09 PROCEDURE — 93970 EXTREMITY STUDY: CPT

## 2023-02-09 PROCEDURE — 93970 EXTREMITY STUDY: CPT | Mod: 26

## 2023-02-10 ENCOUNTER — APPOINTMENT (OUTPATIENT)
Dept: ORTHOPEDIC SURGERY | Facility: HOSPITAL | Age: 53
End: 2023-02-10

## 2023-02-10 ENCOUNTER — NON-APPOINTMENT (OUTPATIENT)
Age: 53
End: 2023-02-10

## 2023-02-15 ENCOUNTER — APPOINTMENT (OUTPATIENT)
Dept: ORTHOPEDIC SURGERY | Facility: CLINIC | Age: 53
End: 2023-02-15
Payer: COMMERCIAL

## 2023-02-15 VITALS
TEMPERATURE: 97 F | HEART RATE: 76 BPM | OXYGEN SATURATION: 99 % | HEIGHT: 67 IN | SYSTOLIC BLOOD PRESSURE: 146 MMHG | DIASTOLIC BLOOD PRESSURE: 88 MMHG | BODY MASS INDEX: 34.21 KG/M2 | WEIGHT: 218 LBS

## 2023-02-15 PROCEDURE — 99213 OFFICE O/P EST LOW 20 MIN: CPT

## 2023-02-15 NOTE — DISCUSSION/SUMMARY
[de-identified] : Orthopedic decision making.\par \par There was a discussion again about total knee replacement in the right knee I feel that she will need in the future a total knee replacement.  At this time however she would like another trial of an injection with the hyaluronic acid.  She will continue taking her Celebrex and we will follow her concerns conservatively at this time however if she is not getting improvement from the hyaluronic acid then I would feel that a right total knee replacement is indicated she is in agreement.  She does not want a cortisone injections at this time.  We will contact her after we have the clearance for the gel injections.\par \par

## 2023-02-15 NOTE — HISTORY OF PRESENT ILLNESS
[de-identified] : Ms. FELIBERTO LOPEZ is a 52 year female who returns to office complaining of bilateral knee pain.\par Pt presents for bilateral knee OA right worse than left. \par Pain is a constant sharp, achy pain which is present at rest and is worsened with prolonged weightbearing and ambulating.\par She reports pain started on 10/10/22. No trauma, but it began after taking a plane flight. \par She has had similar knee OA symptoms in the past.\par She was most recently seen by Dr. Manuel Funk on 11/23/22 and completed a series of Orthovisc injections, which did not help.\par During office visit on 11/30/22, she received a right knee cortisone injection, which did not help either.\par She also takes extra strength Tylenol and Ibuprofen, which help for a short time. she also uses topical Voltaren gel, which helps.\par Patient saw Dr. Vazquez on 12/16/22 and assessed patient to be having more pain from her knee degenerative arthritis rather than her meniscus tearing.\par She had cancelled her right TKR on 2/10/23 because of feeling much better with her knee then.\par However, now her knees have been giving her notable pain and she would like to proceed with Durolane injection to both knees, since these injections helped her the most when last given about 1 year ago.

## 2023-02-15 NOTE — PHYSICAL EXAM
[de-identified] : At this time the patient again had increased symptoms in her right knee.  She is seen by arthroscopic orthopedics and the feeling is with the amount of arthritis she has a total knee replacement would be indicated more than doing arthroscopic surgery.  At this time she feels that her Baker's cyst is better she says it was more swollen a week to 2 ago.  She says in the past she has gotten good results from hyaluronic acid injection her last was only 3 months ago.\par \par KNEE EXAMINATION\par Right knee on today's exam is going from full extension to approximately 115 degrees.  She has good medial lateral and anterior posterior stability.  She has marked discomfort over medial joint line and she does have a small Baker's cyst present and she does have a moderate effusion.\par        \par Left  Knee   has 0 to 125 degrees of motion with good medial lateral and anterior posterior stability.  There is no major effusion there is no Baker's cyst.  There is no significant patellofemoral crepitus.  In this knee also she has discomfort toward her medial joint line.  This knee does have the discomfort also over the medial joint line with a medial Steinmann test.\par \par \par \par

## 2023-02-16 ENCOUNTER — TRANSCRIPTION ENCOUNTER (OUTPATIENT)
Age: 53
End: 2023-02-16

## 2023-02-28 ENCOUNTER — APPOINTMENT (OUTPATIENT)
Dept: ORTHOPEDIC SURGERY | Facility: CLINIC | Age: 53
End: 2023-02-28

## 2023-03-01 ENCOUNTER — APPOINTMENT (OUTPATIENT)
Dept: ENDOCRINOLOGY | Facility: CLINIC | Age: 53
End: 2023-03-01
Payer: COMMERCIAL

## 2023-03-01 VITALS
WEIGHT: 210 LBS | HEART RATE: 96 BPM | BODY MASS INDEX: 32.96 KG/M2 | SYSTOLIC BLOOD PRESSURE: 110 MMHG | HEIGHT: 67 IN | OXYGEN SATURATION: 98 % | DIASTOLIC BLOOD PRESSURE: 80 MMHG

## 2023-03-01 PROCEDURE — 10006 FNA BX W/US GDN EA ADDL: CPT

## 2023-03-01 PROCEDURE — 10005 FNA BX W/US GDN 1ST LES: CPT

## 2023-03-02 NOTE — ASSESSMENT
[FreeTextEntry1] : 1. Thyroid Nodule/Multinodular goiter \par \par Dominant nodules: \par RUP 2.3 cm solid isoechoic \par RMP 2.1 cm solid/spongiform\par \par - FNA performed in office today for both nodules \par - Sample was saved for ThyroSeqv3 \par - Patient tolerated procedure well, provided post-procedural instructions: avoid heavy lifting today, can use ice pack or OTC pain medications (acetaminophen or ibuprofen) if needed. Call back in case of any swelling increasing in size, or severe pain at the site. \par - Will follow up cytology results and formulate further plan with patient and primary endocrinologist\par \par Terry Kelly MD\par [facility]\par Endocrinology at [place] \par [address], [address]\par Ph: [phone]\par Fax: [phone]\par \par

## 2023-03-02 NOTE — PROCEDURE
[Fine Needle Aspiration] : Fine needle aspiration ~T ~C was performed. [Risks] : risks [Benefits] : benefits [Patient] : the patient [Lidocaine Cream] : lidocaine cream [Supine] : The patient was placed in the supine position with the neck extended as tolerated. [Alcohol] : with alcohol [25 gauge 1.5 inch] : A 25 gauge 1.5 inch needle was used [Ultrasonic Guidance] : ultrasound guidance was employed [Sent to Histology] : The specimens were prepared in the usual manner and sent to histology. [Thyroseq] : Thyroseq [Tolerated Well] : the patient tolerated the procedure well [Hemostasis] : hemostasis was assured and the patient was discharged in satisfactory condition [No Complications] : There were no complications [Instructions Given] : Handouts/patient instructions were given to patient [____ Passes] : [unfilled] passes were made through the mass [de-identified] : MNG - RUP 2.3 cm and RMP 2.1 cm nodules [FreeTextEntry7] : Cold spray  [de-identified] : 3 passes RUP 2.3 cm , 3 passes RMP 2.1 cm  [FreeTextEntry6] : with Dr. Harman.

## 2023-03-03 LAB — FNA, THYROID: NORMAL

## 2023-03-24 ENCOUNTER — APPOINTMENT (OUTPATIENT)
Dept: CARDIOLOGY | Facility: CLINIC | Age: 53
End: 2023-03-24

## 2023-04-11 ENCOUNTER — APPOINTMENT (OUTPATIENT)
Dept: ORTHOPEDIC SURGERY | Facility: CLINIC | Age: 53
End: 2023-04-11
Payer: COMMERCIAL

## 2023-04-11 VITALS — HEIGHT: 67 IN | BODY MASS INDEX: 32.96 KG/M2 | WEIGHT: 210 LBS

## 2023-04-11 DIAGNOSIS — M17.11 UNILATERAL PRIMARY OSTEOARTHRITIS, RIGHT KNEE: ICD-10-CM

## 2023-04-11 DIAGNOSIS — M17.12 UNILATERAL PRIMARY OSTEOARTHRITIS, LEFT KNEE: ICD-10-CM

## 2023-04-11 PROCEDURE — 99214 OFFICE O/P EST MOD 30 MIN: CPT | Mod: 25

## 2023-04-11 PROCEDURE — 20610 DRAIN/INJ JOINT/BURSA W/O US: CPT | Mod: 50

## 2023-04-11 NOTE — HISTORY OF PRESENT ILLNESS
[de-identified] : Ms. FELIBERTO LOPEZ is a 52 year female who returns to office complaining of bilateral knee pain.\par Pt presents for bilateral knee OA right worse than left. \par Pain is a constant sharp, achy pain which is present at rest and is worsened with prolonged weightbearing and ambulating.\par She reports pain started on 10/10/22. No trauma, but it began after taking a plane flight. \par She has had similar knee OA symptoms in the past.\par She was recently seen by Dr. Manuel Funk on 11/23/22 and completed a series of Orthovisc injections, which did not help.\par During office visit on 11/30/22, she received a right knee cortisone injection, which did not help either.\par She also takes extra strength Tylenol and Ibuprofen, which help for a short time. she also uses topical Voltaren gel, which helps.\par Patient saw Dr. Vazquez on 12/16/22 and assessed patient to be having more pain from her knee degenerative arthritis rather than her meniscus tearing.\par She had cancelled her right TKR on 2/10/23 because of feeling much better with her knee then.\par However, now her knees have been giving her notable pain and she would like to proceed with Durolane injection to both knees, since these injections helped her the most when last given about 1 year ago.

## 2023-04-11 NOTE — DISCUSSION/SUMMARY
[de-identified] : Therapeutic decision making shows that her knee x-rays do not look that bad and cartilage appears to be maintained in general however the CT of her knee does show the significant degenerative osteoarthritic changes.  If at all possible we would like to keep this patient on conservative measures and at this time she will try the 1 shot hyaluronic acid Durolane.   She tolerated both injections well we will see now whether or not this causes significant improvement in her knees and function.  Return visit 3 to 6 months.

## 2023-04-11 NOTE — PHYSICAL EXAM
[de-identified] : Knees have again become painful and she has difficulty although she can slowly go from 0 to 120 degrees.  Her stability remains good and no major effusion but she does have increased pain.  \par \par \par KNEE EXAMINATION\par \par Right and left knees both go from 0 to 120 degrees today she has good medial lateral and anterior posterior stability.  There is no major effusion and there is no Baker's cyst.  She does have some mild discomfort with patellofemoral pressure and a diffuse discomfort toward her medial joint line.  She would like local injections as well be given low today in the hyaluronic acid family.      \par \par Although her x-rays do not look that severe the CT does show especially of the right knee that she does have the degenerative arthritis.\par  [de-identified] : EXAM: [id number] - CT KNEE ONLY RT - ORDERED BY: CAROLEE LEE\par \par PROCEDURE DATE: 01/04/2023\par \par INTERPRETATION: EXAMINATION: CT of the right knee without contrast\par \par CLINICAL INFORMATION: Right knee pain\par \par TECHNIQUE: Imaging was performed as per the TERESA protocol. Axial CT images were obtained through the right knee. Coronal and sagittal reformatted images were made. Axial images were also obtained through the right hip and ankle\par \par FINDINGS: There is right knee osteoarthritis, most pronounced in the medial and patellofemoral compartments where there is joint space narrowing with subchondral cystic change and sclerosis. There are tricompartmental osteophytes.\par \par There are no advanced arthritic changes of the right hip or right ankle. There is a lipomatous mass within the abductor hallucis muscle which measures 10.5 cm in length. There is severe fatty atrophy of the abductor digiti minimi muscle.\par \par IMPRESSION:\par \par Right knee osteoarthritis, as above.\par \par JACKY ESCOBAR MD; Attending Radiologist\par This document has been electronically signed. Jan 6 2023 9:15AM

## 2023-06-21 ENCOUNTER — TRANSCRIPTION ENCOUNTER (OUTPATIENT)
Age: 53
End: 2023-06-21

## 2023-07-12 ENCOUNTER — APPOINTMENT (OUTPATIENT)
Dept: ORTHOPEDIC SURGERY | Facility: CLINIC | Age: 53
End: 2023-07-12
Payer: COMMERCIAL

## 2023-07-12 VITALS — WEIGHT: 210 LBS | HEIGHT: 67 IN | BODY MASS INDEX: 32.96 KG/M2

## 2023-07-12 PROCEDURE — 20610 DRAIN/INJ JOINT/BURSA W/O US: CPT | Mod: 50

## 2023-07-12 PROCEDURE — 99214 OFFICE O/P EST MOD 30 MIN: CPT | Mod: 25

## 2023-07-12 NOTE — HISTORY OF PRESENT ILLNESS
[de-identified] : Ms. FELIBERTO LOPEZ is a 53 year female who returns to office complaining of bilateral knee pain.\par Pt presents for bilateral knee OA right worse than left. \par Pain is a constant sharp, achy pain which is present at rest and is worsened with prolonged weightbearing and ambulating.\par She reports pain started on 10/10/22. No trauma, but it began after taking a plane flight. \par She has had similar knee OA symptoms in the past.\par She was seen by Dr. Manuel Funk on 11/23/22 and completed a series of Orthovisc injections, which did not help.\par During office visit on 11/30/22, she received a right knee cortisone injection, which did not help either.\par She also takes extra strength Tylenol and Ibuprofen, which help for a short time. she also uses topical Voltaren gel, which helps.\par Patient saw Dr. Vazquez on 12/16/22 and assessed patient to be having more pain from her knee degenerative arthritis rather than her meniscus tearing.\par She had cancelled her right TKR on 2/10/23 because of feeling much better with her knee then.\par At last office visit on 4/11/23, she received Durolane injections to both knees, which also did not provide relief of pain.

## 2023-07-12 NOTE — PHYSICAL EXAM
[de-identified] : Knees have again become painful and she has difficulty although she can slowly go from 0 to 120 degrees her right knee and has 0-115 on the left.  Her stability remains good.  She does have a small effusion present more on the left.\par KNEE EXAMINATION\par \par  she has good medial lateral and anterior posterior stability.  There is no Baker's cyst.  She does have some mild discomfort with patellofemoral pressure and a diffuse discomfort toward her medial joint line more on the left than the right where was previously more on the right..  She would like local injections with Durolane.\par \par Although her x-rays do not look that severe the CT does show especially of the right knee that she does have the degenerative arthritis.\par  [de-identified] : EXAM: [id number] - CT KNEE ONLY RT - ORDERED BY: CAROLEE LEE\par \par PROCEDURE DATE: 01/04/2023\par \par INTERPRETATION: EXAMINATION: CT of the right knee without contrast\par \par CLINICAL INFORMATION: Right knee pain\par \par TECHNIQUE: Imaging was performed as per the [facility] protocol. Axial CT images were obtained through the right knee. Coronal and sagittal reformatted images were made. Axial images were also obtained through the right hip and ankle\par \par FINDINGS: There is right knee osteoarthritis, most pronounced in the medial and patellofemoral compartments where there is joint space narrowing with subchondral cystic change and sclerosis. There are tricompartmental osteophytes.\par \par There are no advanced arthritic changes of the right hip or right ankle. There is a lipomatous mass within the abductor hallucis muscle which measures 10.5 cm in length. There is severe fatty atrophy of the abductor digiti minimi muscle.\par \par IMPRESSION:\par \par Right knee osteoarthritis, as above.\par \par JACKY ESCOBAR MD; Attending Radiologist\par This document has been electronically signed. Jan 6 2023 9:15AM

## 2023-07-12 NOTE — PROCEDURE
[de-identified] : Procedure Note: Durolane Injection\par \par Anatomic Location: Right Knee\par \par Diagnosis: Arthritis\par \par Procedure: Injection of 60mg/3mL Durolane\par \par Lot Number: [id number]    Expiration Date: 09-\par \par Local Spray: Ethyl Chloride.\par \par Patient has consented for the procedure.\par \par Injection through a lateral parapatella approach.\par \par Patient tolerated the procedure well.\par \par Patient instructed to call the office if any reaction, fever, chills, increased erythema or swelling. [phone]. \par \par \par \par Procedure Note: Durolane Injection\par \par Anatomic Location: Left Knee\par \par Diagnosis: Arthritis\par \par Procedure: Injection of 60mg/3mL Durolane\par \par Lot Number: [id number]    Expiration Date: 09-\par \par Local Spray: Ethyl Chloride.\par \par Patient has consented for the procedure.\par \par Injection through a lateral parapatella approach.\par \par Patient tolerated the procedure well.\par \par Patient instructed to call the office if any reaction, fever, chills, increased erythema or swelling. [phone].

## 2023-07-12 NOTE — DISCUSSION/SUMMARY
[de-identified] : Therapeutic decision making shows that her knee x-rays do not look that bad and cartilage appears to be maintained in general however the CT of her knee does show the significant degenerative osteoarthritic changes.  If at all possible we would like to keep this patient on conservative measures and at this time she will try the 1 shot hyaluronic acid Durolane.   She tolerated both injections well we will see now whether or not this causes significant improvement in her knees and function.  Return visit 3 to 6 months.  She will also get an MRI of her left knee because her findings are more significant on the CT done of the right knee that her x-ray and this may well be true on the left.

## 2023-07-13 ENCOUNTER — OUTPATIENT (OUTPATIENT)
Dept: OUTPATIENT SERVICES | Facility: HOSPITAL | Age: 53
LOS: 1 days | End: 2023-07-13
Payer: COMMERCIAL

## 2023-07-13 ENCOUNTER — APPOINTMENT (OUTPATIENT)
Dept: MRI IMAGING | Facility: CLINIC | Age: 53
End: 2023-07-13
Payer: COMMERCIAL

## 2023-07-13 DIAGNOSIS — Z98.890 OTHER SPECIFIED POSTPROCEDURAL STATES: Chronic | ICD-10-CM

## 2023-07-13 DIAGNOSIS — M17.12 UNILATERAL PRIMARY OSTEOARTHRITIS, LEFT KNEE: ICD-10-CM

## 2023-07-13 PROCEDURE — 73721 MRI JNT OF LWR EXTRE W/O DYE: CPT | Mod: 26,LT

## 2023-07-13 PROCEDURE — 73721 MRI JNT OF LWR EXTRE W/O DYE: CPT

## 2023-07-17 ENCOUNTER — APPOINTMENT (OUTPATIENT)
Dept: ENDOCRINOLOGY | Facility: CLINIC | Age: 53
End: 2023-07-17

## 2023-08-10 ENCOUNTER — APPOINTMENT (OUTPATIENT)
Dept: OBGYN | Facility: CLINIC | Age: 53
End: 2023-08-10

## 2023-09-20 DIAGNOSIS — R92.2 INCONCLUSIVE MAMMOGRAM: ICD-10-CM

## 2023-09-20 DIAGNOSIS — Z12.39 ENCOUNTER FOR OTHER SCREENING FOR MALIGNANT NEOPLASM OF BREAST: ICD-10-CM

## 2023-10-02 NOTE — ASU PATIENT PROFILE, ADULT - MEDICATION HERBAL REMEDIES, PROFILE
PROCEDURE DETAILS    Preoperative Diagnosis:  Benign prostatic hyperplasia with lower urinary tract symptoms, N40.1    Postoperative Diagnosis:  Benign prostatic hyperplasia with lower urinary tract symptoms, N40.1    Surgeon: Eusebio Campos  Resident/Fellow/Other Assistant: Ag Vargas    Procedure:  1. HoLEP  2. Cystoscopy with BOTOX INJECTION    Anesthesia: Ladarius Ferrell  Estimated Blood Loss: 20  Findings: urolift clips meticulously removed from prostate  Drains and/or Catheters: 22 fr 3 way, 30cc        Operative Report:   DESCRIPTION OF PROCEDURE:      The patient was brought to OR #3 and after good general anesthesia was achieved, the patient was prepped and draped in dorsal lithotomy position. All pressure points were padded.  Surgical pause was performed.  The patient was identified using 2 identifiers.   The correct surgical procedure was confirmed.  All members of surgical and anesthesia team were in agreement.  The patient received prophylactic antibiotic and the procedure started.    Cystoscopy: The patient's bladder was first entered with a 22-Niuean rigid cystoscope with 30-degree lens.  Cystoscopic examination showed normal anterior urethra.  The posterior urethra showed bilobar enlargement of the prostate with some intravesical  protrusion.  Both ureteral orifices were identified away from the bladder neck.  The rest of the examination was normal.  The cystoscope was removed and the meatus was dilated up to 28-Niuean using sounds.  The urethra was then filled with lidocaine gel  and a 26-Niuean Hassan continuous-flow resectoscope was placed.     Adjustable Botox injection needle was inserted through the sheath.  The needle was set to 3 mm.  We proceeded to inject 100 units of Botox in 10 cc of normal saline in 6 aliquots across the posterior bladder wall.  The Botox injection needle was then  removed.     Holmium laser apparatus was placed through the sheath.  Using a 550-micron end-firing  quartz laser fiber with 10 cm of cladding stripped off, a laser bridge, and a 7-Tajik laser stabilizing catheter, the procedure was started with the above-mentioned  laser setting.      A en bloc technique was performed with an initial incision at the apex and circumferentially using low energy.  We continued to develop our anterior plane at the apex, identifying the capsule and freeing up the anterior apex well within the sphincter.  We then proceeded with our posterior dissection, developing the posterior space toward the bladder neck and continuing our incision laterally to 9 and 3:00.    Then we turned our attention to the lateral attachments of the prostate.  Using laser energy, taking care to avoid any damage to the sphincter, we connected our previously dissected anterior and posterior planes to completely liberate the apex of the  prostate preserving the sphincter. We then continued our dissection circumferentially, freeing the prostate systematically from apex to its attachments at the bladder neck. The adenoma was then pushed into the bladder.     Once the prostate had been fully enucleated, the laser was defocused on any sources of bleeding to get additional hemostasis.  There was good hemostasis at the conclusion of this procedure.  A few small remaining nodular adenomas were then resected from  the capsule.  We meticulously searched for remaining urolift clips and ensured these were evacuated from the bladder.    The laser bridge apparatus and the resectoscope were then removed and the offset Hassan nephroscope and Hassan - Duran tissue morcellator were then introduced and used to completely morcellate the tissue.  Two inflows were used during this portion of the  procedure to fully distend the bladder and to prevent any inadvertent bladder injury.  The bladder was then ellik'd out after insertion of the inner sheath and reinspected with the cystoscope showing no residual adenomas.  Hemostasis was  ensured at the  conclusion of the procedure and both ureteral orifices were confirmed and identified well away from the area of resection.  No residual adenoma could be identified.    Following that, a three-way Medina catheter on a guide was placed into the bladder and the balloon was filled.  The bladder was irrigated near clear and the continuous irrigation was started,    The patient tolerated the procedure well and was shifted to recovery in good general condition.                          Electronic Signatures:  Eusebio Campos)  (Signed 30-May-2023 14:33)   Authored: Post-Operative Note, Chart Review, Note Completion      Last Updated: 30-May-2023 14:33 by Eusebio Campos)    no

## 2023-10-04 ENCOUNTER — TRANSCRIPTION ENCOUNTER (OUTPATIENT)
Age: 53
End: 2023-10-04

## 2023-10-09 ENCOUNTER — TRANSCRIPTION ENCOUNTER (OUTPATIENT)
Age: 53
End: 2023-10-09

## 2023-10-17 ENCOUNTER — APPOINTMENT (OUTPATIENT)
Dept: ENDOCRINOLOGY | Facility: CLINIC | Age: 53
End: 2023-10-17

## 2023-10-17 DIAGNOSIS — E04.1 NONTOXIC SINGLE THYROID NODULE: ICD-10-CM

## 2023-10-17 DIAGNOSIS — E66.9 OBESITY, UNSPECIFIED: ICD-10-CM

## 2023-10-26 ENCOUNTER — APPOINTMENT (OUTPATIENT)
Dept: ULTRASOUND IMAGING | Facility: CLINIC | Age: 53
End: 2023-10-26
Payer: COMMERCIAL

## 2023-10-26 ENCOUNTER — RESULT REVIEW (OUTPATIENT)
Age: 53
End: 2023-10-26

## 2023-10-26 ENCOUNTER — APPOINTMENT (OUTPATIENT)
Dept: MAMMOGRAPHY | Facility: CLINIC | Age: 53
End: 2023-10-26
Payer: COMMERCIAL

## 2023-10-26 PROCEDURE — 76641 ULTRASOUND BREAST COMPLETE: CPT | Mod: 50

## 2023-10-26 PROCEDURE — 77063 BREAST TOMOSYNTHESIS BI: CPT

## 2023-10-26 PROCEDURE — 77067 SCR MAMMO BI INCL CAD: CPT

## 2023-10-27 ENCOUNTER — TRANSCRIPTION ENCOUNTER (OUTPATIENT)
Age: 53
End: 2023-10-27

## 2023-10-27 PROBLEM — E66.9 OBESITY, CLASS II, BMI 35-39.9: Status: ACTIVE | Noted: 2020-02-24

## 2023-10-27 PROBLEM — E04.1 THYROID NODULE: Status: ACTIVE | Noted: 2021-04-16

## 2023-10-27 LAB
ALBUMIN SERPL ELPH-MCNC: 4.3 G/DL
ALP BLD-CCNC: 63 U/L
ALT SERPL-CCNC: 14 U/L
ANION GAP SERPL CALC-SCNC: 8 MMOL/L
AST SERPL-CCNC: 21 U/L
BILIRUB SERPL-MCNC: 0.5 MG/DL
BUN SERPL-MCNC: 16 MG/DL
CALCIUM SERPL-MCNC: 9.6 MG/DL
CHLORIDE SERPL-SCNC: 105 MMOL/L
CHOLEST SERPL-MCNC: 165 MG/DL
CO2 SERPL-SCNC: 32 MMOL/L
CREAT SERPL-MCNC: 0.97 MG/DL
EGFR: 69 ML/MIN/1.73M2
ESTIMATED AVERAGE GLUCOSE: 80 MG/DL
GLUCOSE SERPL-MCNC: 99 MG/DL
HBA1C MFR BLD HPLC: 4.4 %
HDLC SERPL-MCNC: 84 MG/DL
LDLC SERPL CALC-MCNC: 70 MG/DL
NONHDLC SERPL-MCNC: 81 MG/DL
POTASSIUM SERPL-SCNC: 4.1 MMOL/L
PROT SERPL-MCNC: 6.6 G/DL
SODIUM SERPL-SCNC: 145 MMOL/L
TRIGL SERPL-MCNC: 52 MG/DL
TSH SERPL-ACNC: 0.58 UIU/ML

## 2023-11-10 ENCOUNTER — APPOINTMENT (OUTPATIENT)
Dept: OBGYN | Facility: CLINIC | Age: 53
End: 2023-11-10
Payer: COMMERCIAL

## 2023-11-10 VITALS
HEIGHT: 67 IN | BODY MASS INDEX: 32.33 KG/M2 | DIASTOLIC BLOOD PRESSURE: 90 MMHG | WEIGHT: 206 LBS | SYSTOLIC BLOOD PRESSURE: 145 MMHG

## 2023-11-10 DIAGNOSIS — Z91.89 OTHER SPECIFIED PERSONAL RISK FACTORS, NOT ELSEWHERE CLASSIFIED: ICD-10-CM

## 2023-11-10 DIAGNOSIS — D25.9 LEIOMYOMA OF UTERUS, UNSPECIFIED: ICD-10-CM

## 2023-11-10 DIAGNOSIS — Z01.419 ENCOUNTER FOR GYNECOLOGICAL EXAMINATION (GENERAL) (ROUTINE) W/OUT ABNORMAL FINDINGS: ICD-10-CM

## 2023-11-10 PROCEDURE — 99396 PREV VISIT EST AGE 40-64: CPT

## 2023-11-10 PROCEDURE — 82270 OCCULT BLOOD FECES: CPT

## 2023-11-12 LAB
HBV SURFACE AG SER QL: NONREACTIVE
HCV AB SER QL: NONREACTIVE
HCV S/CO RATIO: 0.05 S/CO
HIV1+2 AB SPEC QL IA.RAPID: NONREACTIVE
HPV HIGH+LOW RISK DNA PNL CVX: NOT DETECTED
T PALLIDUM AB SER QL IA: NEGATIVE

## 2023-11-15 LAB — CYTOLOGY CVX/VAG DOC THIN PREP: NORMAL

## 2023-12-07 ENCOUNTER — APPOINTMENT (OUTPATIENT)
Dept: ULTRASOUND IMAGING | Facility: HOSPITAL | Age: 53
End: 2023-12-07
Payer: COMMERCIAL

## 2023-12-07 ENCOUNTER — OUTPATIENT (OUTPATIENT)
Dept: OUTPATIENT SERVICES | Facility: HOSPITAL | Age: 53
LOS: 1 days | End: 2023-12-07
Payer: COMMERCIAL

## 2023-12-07 ENCOUNTER — RESULT REVIEW (OUTPATIENT)
Age: 53
End: 2023-12-07

## 2023-12-07 DIAGNOSIS — Z98.890 OTHER SPECIFIED POSTPROCEDURAL STATES: Chronic | ICD-10-CM

## 2023-12-07 DIAGNOSIS — Z91.89 OTHER SPECIFIED PERSONAL RISK FACTORS, NOT ELSEWHERE CLASSIFIED: ICD-10-CM

## 2023-12-07 DIAGNOSIS — Z00.00 ENCOUNTER FOR GENERAL ADULT MEDICAL EXAMINATION WITHOUT ABNORMAL FINDINGS: ICD-10-CM

## 2023-12-07 PROCEDURE — 76830 TRANSVAGINAL US NON-OB: CPT | Mod: 26

## 2023-12-07 PROCEDURE — 76856 US EXAM PELVIC COMPLETE: CPT

## 2023-12-07 PROCEDURE — 76856 US EXAM PELVIC COMPLETE: CPT | Mod: 26

## 2023-12-07 PROCEDURE — 76830 TRANSVAGINAL US NON-OB: CPT

## 2023-12-11 ENCOUNTER — APPOINTMENT (OUTPATIENT)
Dept: MRI IMAGING | Facility: CLINIC | Age: 53
End: 2023-12-11

## 2023-12-15 ENCOUNTER — NON-APPOINTMENT (OUTPATIENT)
Age: 53
End: 2023-12-15

## 2024-01-02 ENCOUNTER — TRANSCRIPTION ENCOUNTER (OUTPATIENT)
Age: 54
End: 2024-01-02

## 2024-01-05 ENCOUNTER — NON-APPOINTMENT (OUTPATIENT)
Age: 54
End: 2024-01-05

## 2024-01-16 ENCOUNTER — TRANSCRIPTION ENCOUNTER (OUTPATIENT)
Age: 54
End: 2024-01-16

## 2024-01-16 ENCOUNTER — NON-APPOINTMENT (OUTPATIENT)
Age: 54
End: 2024-01-16

## 2024-01-16 DIAGNOSIS — N85.02 ENDOMETRIAL INTRAEPITHELIAL NEOPLASIA [EIN]: ICD-10-CM

## 2024-01-16 DIAGNOSIS — N85.00 ENDOMETRIAL HYPERPLASIA, UNSPECIFIED: ICD-10-CM

## 2024-01-17 ENCOUNTER — NON-APPOINTMENT (OUTPATIENT)
Age: 54
End: 2024-01-17

## 2024-01-17 RX ORDER — PHENTERMINE HYDROCHLORIDE 15 MG/1
15 CAPSULE ORAL
Qty: 30 | Refills: 0 | Status: ACTIVE | COMMUNITY
Start: 2022-12-06 | End: 1900-01-01

## 2024-02-26 ENCOUNTER — OUTPATIENT (OUTPATIENT)
Dept: OUTPATIENT SERVICES | Facility: HOSPITAL | Age: 54
LOS: 1 days | End: 2024-02-26
Payer: COMMERCIAL

## 2024-02-26 ENCOUNTER — APPOINTMENT (OUTPATIENT)
Dept: MRI IMAGING | Facility: IMAGING CENTER | Age: 54
End: 2024-02-26
Payer: COMMERCIAL

## 2024-02-26 DIAGNOSIS — Z98.890 OTHER SPECIFIED POSTPROCEDURAL STATES: Chronic | ICD-10-CM

## 2024-02-26 DIAGNOSIS — Z91.89 OTHER SPECIFIED PERSONAL RISK FACTORS, NOT ELSEWHERE CLASSIFIED: ICD-10-CM

## 2024-02-26 PROCEDURE — A9585: CPT

## 2024-02-26 PROCEDURE — 77049 MRI BREAST C-+ W/CAD BI: CPT | Mod: 26

## 2024-02-26 PROCEDURE — C8937: CPT

## 2024-02-26 PROCEDURE — C8908: CPT

## 2024-02-28 DIAGNOSIS — R92.8 OTHER ABNORMAL AND INCONCLUSIVE FINDINGS ON DIAGNOSTIC IMAGING OF BREAST: ICD-10-CM

## 2024-03-12 ENCOUNTER — RESULT REVIEW (OUTPATIENT)
Age: 54
End: 2024-03-12

## 2024-03-12 ENCOUNTER — APPOINTMENT (OUTPATIENT)
Dept: ORTHOPEDIC SURGERY | Facility: CLINIC | Age: 54
End: 2024-03-12
Payer: COMMERCIAL

## 2024-03-12 ENCOUNTER — APPOINTMENT (OUTPATIENT)
Dept: MRI IMAGING | Facility: IMAGING CENTER | Age: 54
End: 2024-03-12
Payer: COMMERCIAL

## 2024-03-12 ENCOUNTER — OUTPATIENT (OUTPATIENT)
Dept: OUTPATIENT SERVICES | Facility: HOSPITAL | Age: 54
LOS: 1 days | End: 2024-03-12
Payer: COMMERCIAL

## 2024-03-12 VITALS — WEIGHT: 209 LBS | HEIGHT: 67 IN | BODY MASS INDEX: 32.8 KG/M2

## 2024-03-12 DIAGNOSIS — Z00.8 ENCOUNTER FOR OTHER GENERAL EXAMINATION: ICD-10-CM

## 2024-03-12 DIAGNOSIS — M17.0 BILATERAL PRIMARY OSTEOARTHRITIS OF KNEE: ICD-10-CM

## 2024-03-12 DIAGNOSIS — M23.91 UNSPECIFIED INTERNAL DERANGEMENT OF RIGHT KNEE: ICD-10-CM

## 2024-03-12 DIAGNOSIS — Z98.890 OTHER SPECIFIED POSTPROCEDURAL STATES: Chronic | ICD-10-CM

## 2024-03-12 DIAGNOSIS — M23.92 UNSPECIFIED INTERNAL DERANGEMENT OF LEFT KNEE: ICD-10-CM

## 2024-03-12 PROCEDURE — 77065 DX MAMMO INCL CAD UNI: CPT | Mod: 26,LT

## 2024-03-12 PROCEDURE — 73564 X-RAY EXAM KNEE 4 OR MORE: CPT | Mod: 50

## 2024-03-12 PROCEDURE — A9585: CPT

## 2024-03-12 PROCEDURE — 88360 TUMOR IMMUNOHISTOCHEM/MANUAL: CPT | Mod: 26

## 2024-03-12 PROCEDURE — 88305 TISSUE EXAM BY PATHOLOGIST: CPT | Mod: 26

## 2024-03-12 PROCEDURE — 88360 TUMOR IMMUNOHISTOCHEM/MANUAL: CPT

## 2024-03-12 PROCEDURE — A4648: CPT

## 2024-03-12 PROCEDURE — 20610 DRAIN/INJ JOINT/BURSA W/O US: CPT | Mod: 50

## 2024-03-12 PROCEDURE — 99214 OFFICE O/P EST MOD 30 MIN: CPT | Mod: 25

## 2024-03-12 PROCEDURE — 19085 BX BREAST 1ST LESION MR IMAG: CPT | Mod: LT

## 2024-03-12 PROCEDURE — 77065 DX MAMMO INCL CAD UNI: CPT

## 2024-03-12 PROCEDURE — 88305 TISSUE EXAM BY PATHOLOGIST: CPT

## 2024-03-12 PROCEDURE — 19085 BX BREAST 1ST LESION MR IMAG: CPT

## 2024-03-15 ENCOUNTER — NON-APPOINTMENT (OUTPATIENT)
Age: 54
End: 2024-03-15

## 2024-03-15 LAB — SURGICAL PATHOLOGY STUDY: SIGNIFICANT CHANGE UP

## 2024-03-18 ENCOUNTER — NON-APPOINTMENT (OUTPATIENT)
Age: 54
End: 2024-03-18

## 2024-04-02 PROBLEM — Z80.3 FAMILY HISTORY OF BREAST CANCER: Status: ACTIVE | Noted: 2024-04-02

## 2024-04-03 ENCOUNTER — APPOINTMENT (OUTPATIENT)
Dept: PLASTIC SURGERY | Facility: CLINIC | Age: 54
End: 2024-04-03
Payer: COMMERCIAL

## 2024-04-03 VITALS
SYSTOLIC BLOOD PRESSURE: 165 MMHG | HEIGHT: 67 IN | DIASTOLIC BLOOD PRESSURE: 90 MMHG | HEART RATE: 81 BPM | WEIGHT: 209 LBS | TEMPERATURE: 97.8 F | OXYGEN SATURATION: 97 % | BODY MASS INDEX: 32.8 KG/M2

## 2024-04-03 DIAGNOSIS — Z80.3 FAMILY HISTORY OF MALIGNANT NEOPLASM OF BREAST: ICD-10-CM

## 2024-04-03 PROCEDURE — 99245 OFF/OP CONSLTJ NEW/EST HI 55: CPT

## 2024-04-05 ENCOUNTER — APPOINTMENT (OUTPATIENT)
Dept: PLASTIC SURGERY | Facility: CLINIC | Age: 54
End: 2024-04-05
Payer: COMMERCIAL

## 2024-04-05 ENCOUNTER — NON-APPOINTMENT (OUTPATIENT)
Age: 54
End: 2024-04-05

## 2024-04-05 VITALS
WEIGHT: 209 LBS | BODY MASS INDEX: 32.8 KG/M2 | HEART RATE: 84 BPM | DIASTOLIC BLOOD PRESSURE: 87 MMHG | HEIGHT: 67 IN | TEMPERATURE: 97.7 F | SYSTOLIC BLOOD PRESSURE: 137 MMHG | OXYGEN SATURATION: 99 %

## 2024-04-05 DIAGNOSIS — Z42.1 ENCOUNTER FOR BREAST RECONSTRUCTION FOLLOWING MASTECTOMY: ICD-10-CM

## 2024-04-05 PROCEDURE — 99205 OFFICE O/P NEW HI 60 MIN: CPT

## 2024-04-05 RX ORDER — CELECOXIB 200 MG/1
200 CAPSULE ORAL DAILY
Qty: 30 | Refills: 1 | Status: DISCONTINUED | COMMUNITY
Start: 2023-02-15 | End: 2024-04-05

## 2024-04-05 RX ORDER — HYALURONATE SODIUM, STABILIZED 60 MG/3 ML
60 SYRINGE (ML) INTRAARTICULAR
Qty: 2 | Refills: 0 | Status: DISCONTINUED | COMMUNITY
Start: 2023-02-15 | End: 2024-04-05

## 2024-04-05 RX ORDER — PEN NEEDLE, DIABETIC 32GX 5/32"
32G X 4 MM NEEDLE, DISPOSABLE MISCELLANEOUS
Qty: 1 | Refills: 1 | Status: DISCONTINUED | COMMUNITY
Start: 2021-08-25 | End: 2024-04-05

## 2024-04-05 RX ORDER — CELECOXIB 200 MG/1
200 CAPSULE ORAL
Qty: 30 | Refills: 2 | Status: DISCONTINUED | COMMUNITY
Start: 2023-03-30 | End: 2024-04-05

## 2024-04-05 RX ORDER — CELECOXIB 200 MG/1
200 CAPSULE ORAL
Qty: 30 | Refills: 1 | Status: DISCONTINUED | COMMUNITY
Start: 2022-12-09 | End: 2024-04-05

## 2024-04-05 RX ORDER — HYALURONATE SODIUM, STABILIZED 60 MG/3 ML
60 SYRINGE (ML) INTRAARTICULAR
Qty: 2 | Refills: 0 | Status: DISCONTINUED | COMMUNITY
Start: 2024-03-12 | End: 2024-04-05

## 2024-04-05 RX ORDER — ZOSTER VACCINE RECOMBINANT, ADJUVANTED 50 MCG/0.5
50 KIT INTRAMUSCULAR
Qty: 1 | Refills: 0 | Status: DISCONTINUED | COMMUNITY
Start: 2022-07-05 | End: 2024-04-05

## 2024-04-08 NOTE — HISTORY OF PRESENT ILLNESS
[FreeTextEntry1] : She is a 53 year old female here for a consultation regarding newly diagnosed left DCIS.  She has a family history of breast cancer in her sister, who was diagnosed at age 55.  She underwent breast conserving therapy.  She states that she has had a recent recurrence and will be undergoing bilateral mastectomies in [place].  She does not think her sister had genetic testing. She has a past medical history of hypothyroid and HTN; she is status post partial thyroidectomy and multiple myomectomies. 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 10/26/2023 [facility]%. The breasts are extremely dense, which lowers the sensitivity of mammography. No suspicious mass, suspicious microcalcifications, or other sign of malignancy is identified. Bilateral ultrasound: No suspicious solid masses. BI-RADS 1 2/26/2024 Bilateral high risk screening baseline breast MRI: RIGHT BREAST: There are scattered enhancing nonspecific foci.  There is no suspicious enhancement in the right breast. LEFT BREAST: There is a focal area of clumped nonmass enhancement in the anterior slightly upper inner left breast spanning up to 2.4 cm in CC dimension and best seen on axial image 82, coronal image 282. There are scattered enhancing nonspecific foci. AXILLA/OTHER: There is no significant axillary or internal mammary lymphadenopathy. 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 ER+(>95%)/ND+(5%). These results are concordant, recommend surgical or oncologic management.

## 2024-04-08 NOTE — PHYSICAL EXAM
[Normocephalic] : normocephalic [Atraumatic] : atraumatic [EOMI] : extra ocular movement intact [Sclera nonicteric] : sclera nonicteric [Supple] : supple [No Cervical Adenopathy] : no cervical adenopathy [No Supraclavicular Adenopathy] : no supraclavicular adenopathy [No Thyromegaly] : no thyromegaly [Clear to Auscultation Bilat] : clear to auscultation bilaterally [Normal S1, S2] : normal S1 and S2 [No Gallops] : no gallops [No Rubs] : no pericardial rub [Examined in the supine and seated position] : examined in the supine and seated position [Bra Size: ___] : Bra Size: [unfilled] [No dominant masses] : no dominant masses in right breast  [No dominant masses] : no dominant masses left breast [No Nipple Retraction] : no right nipple retraction [No Nipple Discharge] : no left nipple discharge [No Axillary Lymphadenopathy] : no right axillary lymphadenopathy [No Edema] : no edema [No Rashes] : no rashes [No Ulceration] : no ulceration [Asymmetrical] : asymmetrical [de-identified] : Her right breast is larger than her left. [de-identified] : Core scar: left 2:00 (N2.5 cm)

## 2024-04-08 NOTE — ASSESSMENT
[FreeTextEntry1] : Left breast ductal carcinoma in situ (DCIS). I had an extensive discussion with the patient informing her that this is a Stage 0 cancer which has an excellent prognosis with appropriate treatment.   1. The surgical treatment options of left Magseed localization wide excision lumpectomy, followed by post-operative XRT (to reduce risk of local recurrence), vs. left total mastectomy, vs. bilateral total mastectomies, if she should so choose, were discussed in detail. The indications, alternatives, benefits and risks were discussed, including but not limited to bleeding, infection, pain, scar, swelling, numbness, change in breast appearance, recurrence, potential need for additional surgery and lymphedema (if she undergoes LN biopsy). The breast cancer booklet and postoperative instructions were reviewed and provided to the patient.  2. The lymphedema instruction sheet was reviewed and provided. This only applies if she were to need a LN biopsy. 3. I discussed with her the potential for postoperative radiation therapy. 4. I discussed with her the potential for postoperative adjuvant therapy based on the final results of the surgical path, including the possibility of chemotherapy (if invasive cancer found) and/or anti-hormonal therapy as indicated. 5. Slide review: to be requested if patient opts to proceed. 6. Bilateral breast MRI with IV contrast: done. 7. Genetic testing: does meet criteria. [facility] comprehensive genetic testing drawn today. 8. Reconstruction: discussed with patient option of reconstruction if she undergoes mastectomy(ies). I have advised her to meet with Dr. Brennan to discuss reconstructive options.  She is interested in Evelyne flap.  She will need to undergo an abdominal MRI to evaluate abdominal wall blood supply.  She has had abdominal surgery with myomectomies.  She does have bilateral ptosis.  I discussed with her the potential for a nipple areolar sparing approach pending the opinion of Dr. Brennan.  I discussed with her the possibility of a two-stage procedure with a lumpectomy and bilateral oncoplastic reduction to be followed subsequently with bilateral mastectomies with reconstruction. 9.  I discussed with her that she has extremely dense breast tissue which will make future monitoring of breast disease limited.  She is opting to undergo bilateral mastectomies with breast reconstruction.  She will see Dr. Brennan and her surgery will be scheduled thereafter.

## 2024-04-08 NOTE — CONSULT LETTER
[Dear  ___] : Dear  [unfilled], [Consult Letter:] : I had the pleasure of evaluating your patient, [unfilled]. [Sincerely,] : Sincerely, [Please see my note below.] : Please see my note below. [DrEric  ___] : Dr. HU [FreeTextEntry3] : Susan M. Palleschi, MD, FACS Division of Breast Surgery Director, Breast Surgery [address] [address] (Phone) [phone] (Fax) [phone]

## 2024-04-11 ENCOUNTER — APPOINTMENT (OUTPATIENT)
Dept: MRI IMAGING | Facility: CLINIC | Age: 54
End: 2024-04-11
Payer: COMMERCIAL

## 2024-04-11 PROCEDURE — A9585: CPT | Mod: JW

## 2024-04-11 PROCEDURE — 72198 MR ANGIO PELVIS W/O & W/DYE: CPT

## 2024-04-11 PROCEDURE — 74185 MRA ABD W OR W/O CNTRST: CPT

## 2024-04-15 ENCOUNTER — NON-APPOINTMENT (OUTPATIENT)
Age: 54
End: 2024-04-15

## 2024-04-17 ENCOUNTER — APPOINTMENT (OUTPATIENT)
Dept: PLASTIC SURGERY | Facility: CLINIC | Age: 54
End: 2024-04-17
Payer: COMMERCIAL

## 2024-04-17 PROCEDURE — 99443: CPT | Mod: 93

## 2024-04-17 RX ORDER — ASPIRIN 81 MG/1
81 TABLET ORAL DAILY
Qty: 21 | Refills: 0 | Status: ACTIVE | COMMUNITY
Start: 2024-04-17 | End: 1900-01-01

## 2024-04-17 RX ORDER — IBUPROFEN 800 MG/1
800 TABLET, FILM COATED ORAL EVERY 6 HOURS
Qty: 20 | Refills: 0 | Status: ACTIVE | COMMUNITY
Start: 2024-04-17 | End: 1900-01-01

## 2024-04-17 RX ORDER — OXYCODONE AND ACETAMINOPHEN 5; 325 MG/1; MG/1
5-325 TABLET ORAL
Qty: 20 | Refills: 0 | Status: ACTIVE | COMMUNITY
Start: 2024-04-17 | End: 1900-01-01

## 2024-04-17 RX ORDER — CYCLOBENZAPRINE HYDROCHLORIDE 10 MG/1
10 TABLET, FILM COATED ORAL 3 TIMES DAILY
Qty: 15 | Refills: 0 | Status: ACTIVE | COMMUNITY
Start: 2024-04-17 | End: 1900-01-01

## 2024-04-25 ENCOUNTER — APPOINTMENT (OUTPATIENT)
Dept: MRI IMAGING | Facility: CLINIC | Age: 54
End: 2024-04-25
Payer: COMMERCIAL

## 2024-04-25 ENCOUNTER — OUTPATIENT (OUTPATIENT)
Dept: OUTPATIENT SERVICES | Facility: HOSPITAL | Age: 54
LOS: 1 days | End: 2024-04-25
Payer: COMMERCIAL

## 2024-04-25 VITALS
OXYGEN SATURATION: 100 % | RESPIRATION RATE: 18 BRPM | SYSTOLIC BLOOD PRESSURE: 140 MMHG | HEIGHT: 66.5 IN | HEART RATE: 73 BPM | TEMPERATURE: 98 F | DIASTOLIC BLOOD PRESSURE: 84 MMHG | WEIGHT: 218.26 LBS

## 2024-04-25 DIAGNOSIS — D05.12 INTRADUCTAL CARCINOMA IN SITU OF LEFT BREAST: ICD-10-CM

## 2024-04-25 DIAGNOSIS — Z90.13 ACQUIRED ABSENCE OF BILATERAL BREASTS AND NIPPLES: ICD-10-CM

## 2024-04-25 DIAGNOSIS — Z01.818 ENCOUNTER FOR OTHER PREPROCEDURAL EXAMINATION: ICD-10-CM

## 2024-04-25 DIAGNOSIS — Z42.1 ENCOUNTER FOR BREAST RECONSTRUCTION FOLLOWING MASTECTOMY: ICD-10-CM

## 2024-04-25 DIAGNOSIS — E03.9 HYPOTHYROIDISM, UNSPECIFIED: ICD-10-CM

## 2024-04-25 DIAGNOSIS — I10 ESSENTIAL (PRIMARY) HYPERTENSION: ICD-10-CM

## 2024-04-25 DIAGNOSIS — Z98.890 OTHER SPECIFIED POSTPROCEDURAL STATES: Chronic | ICD-10-CM

## 2024-04-25 PROCEDURE — A9585: CPT

## 2024-04-25 PROCEDURE — 86850 RBC ANTIBODY SCREEN: CPT

## 2024-04-25 PROCEDURE — 80048 BASIC METABOLIC PNL TOTAL CA: CPT

## 2024-04-25 PROCEDURE — 93005 ELECTROCARDIOGRAM TRACING: CPT

## 2024-04-25 PROCEDURE — 86900 BLOOD TYPING SEROLOGIC ABO: CPT

## 2024-04-25 PROCEDURE — 85027 COMPLETE CBC AUTOMATED: CPT

## 2024-04-25 PROCEDURE — 72197 MRI PELVIS W/O & W/DYE: CPT

## 2024-04-25 PROCEDURE — 36415 COLL VENOUS BLD VENIPUNCTURE: CPT

## 2024-04-25 PROCEDURE — G0463: CPT

## 2024-04-25 PROCEDURE — 93010 ELECTROCARDIOGRAM REPORT: CPT | Mod: NC

## 2024-04-25 PROCEDURE — 86901 BLOOD TYPING SEROLOGIC RH(D): CPT

## 2024-04-25 NOTE — H&P PST ADULT - COMMENTS
Denies h/o or family h/o DVT, PE  Denies bleeding or clotting disorders  Denies dentures/partials, loose teeth/caps 165/124 165/124 and 170/102 electronic

## 2024-04-25 NOTE — H&P PST ADULT - PROBLEM SELECTOR PLAN 1
Patient provided with pre-operative instructions and verbalized understanding.  Patient can take ________ but otherwise will be NPO on day of surgery. Patient will stop NSAIDs, aspirin, herbal supplements or vitamins 1 week prior to surgery.  Chlorhexidine wash provided with instructions, verbalized understanding. Pending medical clearance as per surgeon. Patient provided with pre-operative instructions and verbalized understanding.  Patient can take olmesartan but otherwise will be NPO on day of surgery. Patient will stop NSAIDs, aspirin, herbal supplements or vitamins 1 week prior to surgery.  Chlorhexidine wash provided with instructions, verbalized understanding. Patient provided with pre-operative instructions and verbalized understanding.  Patient can take levothyroxine and olmesartan but otherwise will be NPO on day of surgery. Patient will stop NSAIDs, aspirin, herbal supplements or vitamins 1 week prior to surgery.  Chlorhexidine wash provided with instructions, verbalized understanding.

## 2024-04-25 NOTE — H&P PST ADULT - ATTENDING COMMENTS
The patient is a 54 year old female who was recently diagnosed with left breast DCIS. She presents to undergo bilateral skin sparing total mastectomies, injection of bilateral breasts with Magtrace, and bilateral DANY flap reconstruction.

## 2024-04-25 NOTE — H&P PST ADULT - NSICDXPASTMEDICALHX_GEN_ALL_CORE_FT
PAST MEDICAL HISTORY:  Bilateral chronic knee pain     Chronic back pain     Cold thyroid nodule     Fibroids     Hypothyroidism     Obese     Polyp of corpus uteri      PAST MEDICAL HISTORY:  Bilateral chronic knee pain     Chronic back pain     Cold thyroid nodule     Fibroids     HTN (hypertension)     Hypothyroidism     Intraductal carcinoma in situ of left breast     Obese     Polyp of corpus uteri

## 2024-04-25 NOTE — H&P PST ADULT - HISTORY OF PRESENT ILLNESS
Patient is a 53 year old F/M with PMHx of ___ with no pertinent medical history presents for preoperative testing for with  __ scheduled for /2024. Reports____. Denies any acute symptoms at this time. Otherwise, patient states feeling well overall.    Patient is a 53 year old F with PMHx of ___ with no pertinent medical history presents for preoperative testing for with  __ scheduled for /2024. Reports____. Denies any acute symptoms at this time. Otherwise, patient states feeling well overall.       biopsy, cancer, MRI   brca negative  Patient is a 53 year old F with PMHx of SAMANTHA uses occasional dental device, HTN, hypothyroid presents to PST for preoperative diagnosis of Intraductal carcinoma in situ of left breast with Dr. Susan Palleschi scheduled for 05/13/2024. Reports having her scheduled mammogram, had a MRI, abnormal lesion noted, had biopsy which came back positive for malignancy therefore having upcoming surgery. States she is BRCA negative. Denies breast pain/tenderness, breast lumps/masses, nipple discharge/inversion or any acute symptoms at this time. Otherwise, patient states feeling well overall.

## 2024-04-25 NOTE — H&P PST ADULT - NSANTHOSAYNRD_GEN_A_CORE
neck inches/No. SAMANTHA screening performed.  STOP BANG Legend: 0-2 = LOW Risk; 3-4 = INTERMEDIATE Risk; 5-8 = HIGH Risk neck 16 inches/Yes

## 2024-04-25 NOTE — H&P PST ADULT - WEIGHT IN LBS
Continue Regimen: Betametasone ointment
Detail Level: Zone
Continue Regimen: Betametasone BID to scaly areas
Continue Regimen: Prednisone 3 pills po QD for 7 days
218.2

## 2024-04-26 NOTE — ED ADULT NURSE NOTE - CAS DISCH ACCOMP BY
5-year-old female complaining of loose cough and fever at school today.  Denies otalgia sore throat headache shortness of breath fevers chills sweats  Symptoms improved with Tylenol        /76   Pulse 105   Temp 36.7 °C (98.1 °F)   Wt 21.8 kg   SpO2 98%     Alert playful active with cough  Not toxic appearing  TMs clear  Oropharynx without exudates  Membranes moist  Nasal mucosa red  Clear rhinorrhea seen  Chest clear to auscultation without rales rhonchi or wheeze.  Good air movement  Heart split S2 no murmur  Abdomen soft nontender  Good capillary refill    
Spouse

## 2024-04-29 ENCOUNTER — RESULT REVIEW (OUTPATIENT)
Age: 54
End: 2024-04-29

## 2024-04-29 LAB — SURGICAL PATHOLOGY STUDY: SIGNIFICANT CHANGE UP

## 2024-05-03 PROBLEM — D05.12 INTRADUCTAL CARCINOMA IN SITU OF LEFT BREAST: Chronic | Status: ACTIVE | Noted: 2024-04-25

## 2024-05-03 PROBLEM — I10 ESSENTIAL (PRIMARY) HYPERTENSION: Chronic | Status: ACTIVE | Noted: 2024-04-25

## 2024-05-12 ENCOUNTER — TRANSCRIPTION ENCOUNTER (OUTPATIENT)
Age: 54
End: 2024-05-12

## 2024-05-12 ENCOUNTER — NON-APPOINTMENT (OUTPATIENT)
Age: 54
End: 2024-05-12

## 2024-05-13 ENCOUNTER — INPATIENT (INPATIENT)
Facility: HOSPITAL | Age: 54
LOS: 1 days | Discharge: HOME | DRG: 951 | End: 2024-05-15
Attending: INTERNAL MEDICINE | Admitting: SURGERY
Payer: COMMERCIAL

## 2024-05-13 ENCOUNTER — APPOINTMENT (OUTPATIENT)
Dept: PLASTIC SURGERY | Facility: HOSPITAL | Age: 54
End: 2024-05-13
Payer: COMMERCIAL

## 2024-05-13 ENCOUNTER — RESULT REVIEW (OUTPATIENT)
Age: 54
End: 2024-05-13

## 2024-05-13 VITALS
WEIGHT: 220.9 LBS | DIASTOLIC BLOOD PRESSURE: 92 MMHG | TEMPERATURE: 98 F | RESPIRATION RATE: 14 BRPM | HEIGHT: 66.5 IN | SYSTOLIC BLOOD PRESSURE: 158 MMHG | OXYGEN SATURATION: 99 % | HEART RATE: 64 BPM

## 2024-05-13 DIAGNOSIS — Z90.13 ACQUIRED ABSENCE OF BILATERAL BREASTS AND NIPPLES: ICD-10-CM

## 2024-05-13 DIAGNOSIS — Z98.890 OTHER SPECIFIED POSTPROCEDURAL STATES: Chronic | ICD-10-CM

## 2024-05-13 PROCEDURE — 38530 BIOPSY/REMOVAL LYMPH NODES: CPT | Mod: LT

## 2024-05-13 PROCEDURE — 19303 MAST SIMPLE COMPLETE: CPT | Mod: 50

## 2024-05-13 PROCEDURE — 38530 BIOPSY/REMOVAL LYMPH NODES: CPT | Mod: RT

## 2024-05-13 PROCEDURE — 38900 IO MAP OF SENT LYMPH NODE: CPT | Mod: 50

## 2024-05-13 PROCEDURE — 21600 PARTIAL REMOVAL OF RIB: CPT | Mod: RT,59

## 2024-05-13 PROCEDURE — 88305 TISSUE EXAM BY PATHOLOGIST: CPT | Mod: 26

## 2024-05-13 PROCEDURE — 21600 PARTIAL REMOVAL OF RIB: CPT | Mod: LT,59

## 2024-05-13 PROCEDURE — S2068: CPT | Mod: RT

## 2024-05-13 PROCEDURE — S2068: CPT | Mod: LT

## 2024-05-13 PROCEDURE — 15877 SUCTION LIPECTOMY TRUNK: CPT

## 2024-05-13 PROCEDURE — 19303 MAST SIMPLE COMPLETE: CPT | Mod: AS,50

## 2024-05-13 PROCEDURE — 88307 TISSUE EXAM BY PATHOLOGIST: CPT | Mod: 26

## 2024-05-13 DEVICE — DOPPLER PROBE DISPOSABLE: Type: IMPLANTABLE DEVICE | Site: BILATERAL | Status: FUNCTIONAL

## 2024-05-13 DEVICE — SURGICEL NU-KNIT 6 X 9": Type: IMPLANTABLE DEVICE | Site: BILATERAL | Status: FUNCTIONAL

## 2024-05-13 DEVICE — CARTRIDGE MICROCLIP 30: Type: IMPLANTABLE DEVICE | Site: BILATERAL | Status: FUNCTIONAL

## 2024-05-13 DEVICE — COUPLER VESSEL ANASTOMOTIC 2.5MM: Type: IMPLANTABLE DEVICE | Site: BILATERAL | Status: FUNCTIONAL

## 2024-05-13 DEVICE — CLIP APPLIER ETHICON LIGACLIP 9 3/8" SMALL: Type: IMPLANTABLE DEVICE | Site: BILATERAL | Status: FUNCTIONAL

## 2024-05-13 DEVICE — LIGATING CLIPS WECK HORIZON MEDIUM (BLUE) 6: Type: IMPLANTABLE DEVICE | Site: BILATERAL | Status: FUNCTIONAL

## 2024-05-13 DEVICE — MESH PHASIX 2.4X6.3IN: Type: IMPLANTABLE DEVICE | Site: BILATERAL | Status: FUNCTIONAL

## 2024-05-13 DEVICE — CLIP APPLIER ETHICON LIGACLIP 11.5" MEDIUM: Type: IMPLANTABLE DEVICE | Site: BILATERAL | Status: FUNCTIONAL

## 2024-05-13 DEVICE — LIGATING CLIPS WECK HORIZON SMALL (YELLOW) 24: Type: IMPLANTABLE DEVICE | Site: BILATERAL | Status: FUNCTIONAL

## 2024-05-13 RX ORDER — MELOXICAM 15 MG/1
1 TABLET ORAL
Qty: 0 | Refills: 0 | DISCHARGE

## 2024-05-13 RX ORDER — ONDANSETRON 8 MG/1
4 TABLET, FILM COATED ORAL ONCE
Refills: 0 | Status: DISCONTINUED | OUTPATIENT
Start: 2024-05-13 | End: 2024-05-13

## 2024-05-13 RX ORDER — ACETAMINOPHEN 500 MG
1000 TABLET ORAL EVERY 6 HOURS
Refills: 0 | Status: COMPLETED | OUTPATIENT
Start: 2024-05-13 | End: 2024-05-14

## 2024-05-13 RX ORDER — CEFAZOLIN SODIUM 1 G
2000 VIAL (EA) INJECTION EVERY 8 HOURS
Refills: 0 | Status: COMPLETED | OUTPATIENT
Start: 2024-05-13 | End: 2024-05-14

## 2024-05-13 RX ORDER — LIRAGLUTIDE 6 MG/ML
1.2 INJECTION SUBCUTANEOUS
Qty: 0 | Refills: 0 | DISCHARGE

## 2024-05-13 RX ORDER — ENOXAPARIN SODIUM 100 MG/ML
40 INJECTION SUBCUTANEOUS EVERY 24 HOURS
Refills: 0 | Status: DISCONTINUED | OUTPATIENT
Start: 2024-05-14 | End: 2024-05-15

## 2024-05-13 RX ORDER — ACETAMINOPHEN 500 MG
650 TABLET ORAL EVERY 6 HOURS
Refills: 0 | Status: COMPLETED | OUTPATIENT
Start: 2024-05-13 | End: 2025-04-11

## 2024-05-13 RX ORDER — OLMESARTAN MEDOXOMIL 5 MG/1
2 TABLET, FILM COATED ORAL
Refills: 0 | DISCHARGE

## 2024-05-13 RX ORDER — SODIUM CHLORIDE 9 MG/ML
1000 INJECTION, SOLUTION INTRAVENOUS
Refills: 0 | Status: DISCONTINUED | OUTPATIENT
Start: 2024-05-13 | End: 2024-05-13

## 2024-05-13 RX ORDER — LEVOTHYROXINE SODIUM 125 MCG
1 TABLET ORAL
Refills: 0 | DISCHARGE

## 2024-05-13 RX ORDER — KETOROLAC TROMETHAMINE 30 MG/ML
15 SYRINGE (ML) INJECTION EVERY 6 HOURS
Refills: 0 | Status: DISCONTINUED | OUTPATIENT
Start: 2024-05-13 | End: 2024-05-15

## 2024-05-13 RX ORDER — LOSARTAN POTASSIUM 100 MG/1
25 TABLET, FILM COATED ORAL DAILY
Refills: 0 | Status: DISCONTINUED | OUTPATIENT
Start: 2024-05-13 | End: 2024-05-15

## 2024-05-13 RX ORDER — SENNA PLUS 8.6 MG/1
2 TABLET ORAL AT BEDTIME
Refills: 0 | Status: DISCONTINUED | OUTPATIENT
Start: 2024-05-13 | End: 2024-05-15

## 2024-05-13 RX ORDER — OXYCODONE HYDROCHLORIDE 5 MG/1
5 TABLET ORAL EVERY 4 HOURS
Refills: 0 | Status: DISCONTINUED | OUTPATIENT
Start: 2024-05-13 | End: 2024-05-15

## 2024-05-13 RX ORDER — SODIUM CHLORIDE 9 MG/ML
1000 INJECTION, SOLUTION INTRAVENOUS
Refills: 0 | Status: DISCONTINUED | OUTPATIENT
Start: 2024-05-13 | End: 2024-05-15

## 2024-05-13 RX ORDER — LEVOTHYROXINE SODIUM 125 MCG
88 TABLET ORAL DAILY
Refills: 0 | Status: DISCONTINUED | OUTPATIENT
Start: 2024-05-13 | End: 2024-05-15

## 2024-05-13 RX ORDER — OXYCODONE HYDROCHLORIDE 5 MG/1
10 TABLET ORAL EVERY 4 HOURS
Refills: 0 | Status: DISCONTINUED | OUTPATIENT
Start: 2024-05-13 | End: 2024-05-15

## 2024-05-13 RX ORDER — HYDROMORPHONE HYDROCHLORIDE 2 MG/ML
0.5 INJECTION INTRAMUSCULAR; INTRAVENOUS; SUBCUTANEOUS
Refills: 0 | Status: DISCONTINUED | OUTPATIENT
Start: 2024-05-13 | End: 2024-05-13

## 2024-05-13 RX ORDER — LEVOTHYROXINE SODIUM 125 MCG
1 TABLET ORAL
Qty: 0 | Refills: 0 | DISCHARGE

## 2024-05-13 RX ORDER — HYDROMORPHONE HYDROCHLORIDE 2 MG/ML
1 INJECTION INTRAMUSCULAR; INTRAVENOUS; SUBCUTANEOUS
Refills: 0 | Status: DISCONTINUED | OUTPATIENT
Start: 2024-05-13 | End: 2024-05-13

## 2024-05-13 RX ADMIN — OXYCODONE HYDROCHLORIDE 10 MILLIGRAM(S): 5 TABLET ORAL at 20:10

## 2024-05-13 RX ADMIN — Medication 400 MILLIGRAM(S): at 23:04

## 2024-05-13 RX ADMIN — SENNA PLUS 2 TABLET(S): 8.6 TABLET ORAL at 21:05

## 2024-05-13 RX ADMIN — HYDROMORPHONE HYDROCHLORIDE 0.5 MILLIGRAM(S): 2 INJECTION INTRAMUSCULAR; INTRAVENOUS; SUBCUTANEOUS at 17:18

## 2024-05-13 RX ADMIN — Medication 15 MILLIGRAM(S): at 18:30

## 2024-05-13 RX ADMIN — Medication 100 MILLIGRAM(S): at 21:05

## 2024-05-13 RX ADMIN — Medication 1000 MILLIGRAM(S): at 19:00

## 2024-05-13 RX ADMIN — HYDROMORPHONE HYDROCHLORIDE 0.5 MILLIGRAM(S): 2 INJECTION INTRAMUSCULAR; INTRAVENOUS; SUBCUTANEOUS at 17:00

## 2024-05-13 RX ADMIN — Medication 400 MILLIGRAM(S): at 18:30

## 2024-05-13 RX ADMIN — Medication 15 MILLIGRAM(S): at 17:56

## 2024-05-13 RX ADMIN — OXYCODONE HYDROCHLORIDE 10 MILLIGRAM(S): 5 TABLET ORAL at 21:00

## 2024-05-13 RX ADMIN — SODIUM CHLORIDE 50 MILLILITER(S): 9 INJECTION, SOLUTION INTRAVENOUS at 06:10

## 2024-05-13 NOTE — BRIEF OPERATIVE NOTE - NSICDXBRIEFPOSTOP_GEN_ALL_CORE_FT
POST-OP DIAGNOSIS:  Ductal carcinoma in situ (DCIS) of left breast 13-May-2024 11:12:13  Palleschi, Susan M  History of breast cancer 13-May-2024 16:30:21  Jennifer Ren  
POST-OP DIAGNOSIS:  Ductal carcinoma in situ (DCIS) of left breast 13-May-2024 11:12:13  Palleschi, Susan M

## 2024-05-13 NOTE — PATIENT PROFILE ADULT - FALL HARM RISK - FALL HARM RISK
No indicators present Oral Minoxidil Pregnancy And Lactation Text: This medication should only be used when clearly needed if you are pregnant, attempting to become pregnant or breast feeding.

## 2024-05-13 NOTE — PRE-OP CHECKLIST - DENTURES
From: Josefina Bryant  To: Jeremiah Be  Sent: 6/13/2022 4:00 PM CDT  Subject: Ankles are swelling     Hi Pat Cook here. I am Josefina Bryant's daughter. My mothers ankles have recently started to swell on a daily basis. It's happening daily. Is it possible that her medication could be causing this? Could be her heart condition?     I tried to schedule an appt with you to have her seen and the soonest appt is 7/5. Any way we could get in sooner?    She also got dizzy yesterday while brushing her teeth after dinner.   Additionally, she is experiencing left knee pain as well. I think this is unrelated to the swelling of her ankles.     We are trying to get in to see an orthopedic doctor to address.    Please advise.     Thank you in Pat downs and Josefina Bryant  [phone]    Please advise.    no

## 2024-05-13 NOTE — BRIEF OPERATIVE NOTE - NSICDXBRIEFPROCEDURE_GEN_ALL_CORE_FT
PROCEDURES:  Breast reconstruction with DANY free flap 13-May-2024 16:29:16  Jennifer Ren  Partial rib resection 13-May-2024 16:29:25  Jennifer Ren  Excision, lymph node, internal mammary 13-May-2024 16:29:43  Jennifer Ren  
PROCEDURES:  Bilateral skin sparing mastectomy 13-May-2024 11:11:32 injection of bilateral breasts with Magtrace Palleschi, Susan M

## 2024-05-13 NOTE — BRIEF OPERATIVE NOTE - NSICDXBRIEFPREOP_GEN_ALL_CORE_FT
PRE-OP DIAGNOSIS:  Ductal carcinoma in situ (DCIS) of left breast 13-May-2024 11:12:03  Palleschi, Susan M  
PRE-OP DIAGNOSIS:  Ductal carcinoma in situ (DCIS) of left breast 13-May-2024 11:12:03  Palleschi, Susan M  History of breast cancer 13-May-2024 16:29:58  Jennifer Ren

## 2024-05-13 NOTE — BRIEF OPERATIVE NOTE - OPERATION/FINDINGS
breast reconstruction with bilateral deep inferior epigastric  flaps, bilateral partial rib resection, internal mammary lymph node biopsies
Magtrace uptake: right 824, left 1001

## 2024-05-13 NOTE — PRE-OP CHECKLIST - LOOSE TEETH
ANTICOAGULATION FOLLOW-UP CLINIC VISIT    Patient Name:  Michael Martinez  Date:  10/14/2019  Contact Type:  Face to Face    SUBJECTIVE:  Patient Findings         Clinical Outcomes     Negatives:   Major bleeding event, Thromboembolic event, Anticoagulation-related hospital admission, Anticoagulation-related ED visit, Anticoagulation-related fatality           OBJECTIVE    INR Protime   Date Value Ref Range Status   10/14/2019 1.9 (A) 0.86 - 1.14 Final       ASSESSMENT / PLAN  INR assessment SUB    Recheck INR In: 3 WEEKS    INR Location Clinic      Anticoagulation Summary  As of 10/14/2019    INR goal:   2.0-3.0   TTR:   93.6 % (2.9 y)   INR used for dosin.9! (10/14/2019)   Warfarin maintenance plan:   5 mg (5 mg x 1) every Mon, Fri; 7.5 mg (7.5 mg x 1) all other days   Full warfarin instructions:   10/14: 7.5 mg; Otherwise 5 mg every Mon, Fri; 7.5 mg all other days   Weekly warfarin total:   47.5 mg   Plan last modified:   Naya Diallo RN (2019)   Next INR check:   2019   Priority:   INR   Target end date:   Indefinite    Indications    Long-term (current) use of anticoagulants [Z79.01] [Z79.01]  Anticoagulation monitoring  INR range 2-3 [Z79.01]  History of pulmonary embolism [Z86.711]  Personal history of DVT (deep vein thrombosis) [Z86.718]             Anticoagulation Episode Summary     INR check location:   Anticoagulation Clinic    Preferred lab:   [facility]    Send INR reminders to:    INR    Comments:   Leaves for       Anticoagulation Care Providers     Provider Role Specialty Phone number    Isma Martinez MD [facility] [phone]            See the Encounter Report to view Anticoagulation Flowsheet and Dosing Calendar (Go to Encounters tab in chart review, and find the Anticoagulation Therapy Visit)        Maria Del Rosario Trujillo RN                  no

## 2024-05-14 ENCOUNTER — APPOINTMENT (OUTPATIENT)
Dept: INTERNAL MEDICINE | Facility: CLINIC | Age: 54
End: 2024-05-14

## 2024-05-14 ENCOUNTER — TRANSCRIPTION ENCOUNTER (OUTPATIENT)
Age: 54
End: 2024-05-14

## 2024-05-14 LAB
ANION GAP SERPL CALC-SCNC: 6 MMOL/L — SIGNIFICANT CHANGE UP (ref 5–17)
BUN SERPL-MCNC: 16 MG/DL — SIGNIFICANT CHANGE UP (ref 7–23)
CALCIUM SERPL-MCNC: 8.2 MG/DL — LOW (ref 8.4–10.5)
CHLORIDE SERPL-SCNC: 108 MMOL/L — SIGNIFICANT CHANGE UP (ref 96–108)
CO2 SERPL-SCNC: 31 MMOL/L — SIGNIFICANT CHANGE UP (ref 22–31)
CREAT SERPL-MCNC: 1.29 MG/DL — SIGNIFICANT CHANGE UP (ref 0.5–1.3)
EGFR: 49 ML/MIN/1.73M2 — LOW
GLUCOSE SERPL-MCNC: 103 MG/DL — HIGH (ref 70–99)
HCT VFR BLD CALC: 34.9 % — SIGNIFICANT CHANGE UP (ref 34.5–45)
HGB BLD-MCNC: 11.6 G/DL — SIGNIFICANT CHANGE UP (ref 11.5–15.5)
MAGNESIUM SERPL-MCNC: 1.9 MG/DL — SIGNIFICANT CHANGE UP (ref 1.6–2.6)
MCHC RBC-ENTMCNC: 27.2 PG — SIGNIFICANT CHANGE UP (ref 27–34)
MCHC RBC-ENTMCNC: 33.2 GM/DL — SIGNIFICANT CHANGE UP (ref 32–36)
MCV RBC AUTO: 81.9 FL — SIGNIFICANT CHANGE UP (ref 80–100)
NRBC # BLD: 0 /100 WBCS — SIGNIFICANT CHANGE UP (ref 0–0)
PHOSPHATE SERPL-MCNC: 4.4 MG/DL — SIGNIFICANT CHANGE UP (ref 2.5–4.5)
PLATELET # BLD AUTO: 166 K/UL — SIGNIFICANT CHANGE UP (ref 150–400)
POTASSIUM SERPL-MCNC: 4 MMOL/L — SIGNIFICANT CHANGE UP (ref 3.5–5.3)
POTASSIUM SERPL-SCNC: 4 MMOL/L — SIGNIFICANT CHANGE UP (ref 3.5–5.3)
RBC # BLD: 4.26 M/UL — SIGNIFICANT CHANGE UP (ref 3.8–5.2)
RBC # FLD: 15.9 % — HIGH (ref 10.3–14.5)
SODIUM SERPL-SCNC: 145 MMOL/L — SIGNIFICANT CHANGE UP (ref 135–145)
WBC # BLD: 8.35 K/UL — SIGNIFICANT CHANGE UP (ref 3.8–10.5)
WBC # FLD AUTO: 8.35 K/UL — SIGNIFICANT CHANGE UP (ref 3.8–10.5)

## 2024-05-14 RX ORDER — ACETAMINOPHEN 500 MG
650 TABLET ORAL EVERY 6 HOURS
Refills: 0 | Status: DISCONTINUED | OUTPATIENT
Start: 2024-05-14 | End: 2024-05-15

## 2024-05-14 RX ADMIN — Medication 15 MILLIGRAM(S): at 12:06

## 2024-05-14 RX ADMIN — Medication 400 MILLIGRAM(S): at 12:49

## 2024-05-14 RX ADMIN — Medication 400 MILLIGRAM(S): at 05:22

## 2024-05-14 RX ADMIN — Medication 15 MILLIGRAM(S): at 00:34

## 2024-05-14 RX ADMIN — Medication 88 MICROGRAM(S): at 05:40

## 2024-05-14 RX ADMIN — Medication 1000 MILLIGRAM(S): at 13:19

## 2024-05-14 RX ADMIN — Medication 650 MILLIGRAM(S): at 18:35

## 2024-05-14 RX ADMIN — OXYCODONE HYDROCHLORIDE 10 MILLIGRAM(S): 5 TABLET ORAL at 08:27

## 2024-05-14 RX ADMIN — LOSARTAN POTASSIUM 25 MILLIGRAM(S): 100 TABLET, FILM COATED ORAL at 05:40

## 2024-05-14 RX ADMIN — Medication 15 MILLIGRAM(S): at 05:29

## 2024-05-14 RX ADMIN — Medication 650 MILLIGRAM(S): at 19:35

## 2024-05-14 RX ADMIN — OXYCODONE HYDROCHLORIDE 10 MILLIGRAM(S): 5 TABLET ORAL at 22:24

## 2024-05-14 RX ADMIN — OXYCODONE HYDROCHLORIDE 10 MILLIGRAM(S): 5 TABLET ORAL at 21:24

## 2024-05-14 RX ADMIN — Medication 15 MILLIGRAM(S): at 18:35

## 2024-05-14 RX ADMIN — OXYCODONE HYDROCHLORIDE 10 MILLIGRAM(S): 5 TABLET ORAL at 08:57

## 2024-05-14 RX ADMIN — Medication 1000 MILLIGRAM(S): at 00:00

## 2024-05-14 RX ADMIN — Medication 1000 MILLIGRAM(S): at 06:49

## 2024-05-14 RX ADMIN — Medication 100 MILLIGRAM(S): at 05:40

## 2024-05-14 RX ADMIN — Medication 15 MILLIGRAM(S): at 19:35

## 2024-05-14 RX ADMIN — Medication 15 MILLIGRAM(S): at 06:49

## 2024-05-14 RX ADMIN — SENNA PLUS 2 TABLET(S): 8.6 TABLET ORAL at 21:24

## 2024-05-14 RX ADMIN — Medication 15 MILLIGRAM(S): at 01:10

## 2024-05-14 RX ADMIN — ENOXAPARIN SODIUM 40 MILLIGRAM(S): 100 INJECTION SUBCUTANEOUS at 05:40

## 2024-05-14 RX ADMIN — OXYCODONE HYDROCHLORIDE 10 MILLIGRAM(S): 5 TABLET ORAL at 16:16

## 2024-05-14 RX ADMIN — OXYCODONE HYDROCHLORIDE 10 MILLIGRAM(S): 5 TABLET ORAL at 16:46

## 2024-05-14 RX ADMIN — Medication 15 MILLIGRAM(S): at 12:36

## 2024-05-14 NOTE — DISCHARGE NOTE PROVIDER - HOSPITAL COURSE
54F with PMHx of SAMANTHA, HTN, hypothyroid presented to [facility] on 5/13 for scheduled procedure. Pt now s/p b/l skin-sparing mastectomies w/ b/l DANY reconstruction with Drs. Palleschi, Anu, and Ananth. Operative course uneventful, pt recovered in PACU and admitted to surgical floor for further monitoring. Pt cook dopplers, vitals, and labs were monitored throughout stay. Pt was on DVT prophylaxis during hospital admission. POD1 unger was removed and pt advanced to regular diet. Pt OOB/ambulated. POD2 pt was seen and examined by attending surgeon and determined stable for discharge. Cook dopplers were disconnected. At time of discharge pt was HD stable, tolerating diet, voiding freely, exam stable. Pt understands and agreeable to plan.   54F with PMHx of SAMANTHA, HTN, hypothyroid presented to [facility] on 5/13 for scheduled procedure. Pt now s/p b/l skin-sparing mastectomies w/ b/l DANY reconstruction with Drs. Palleschi, Tanna, and Ananth. Operative course uneventful, pt recovered in PACU and admitted to surgical floor for further monitoring. Pt cook dopplers, vitals, and labs were monitored throughout stay. Pt was on DVT prophylaxis during hospital admission. POD1 unger was removed and pt advanced to regular diet. Pt OOB/ambulated. POD2 pt was seen and examined by attending surgeon and determined stable for discharge. Cook dopplers were disconnected. At time of discharge pt was HD stable, tolerating diet, voiding freely, exam stable. Pt understands and agreeable to plan. Patient will follow up with Dr Brennan on 5/20.

## 2024-05-14 NOTE — DISCHARGE NOTE PROVIDER - NSDCCPTREATMENT_GEN_ALL_CORE_FT
PRINCIPAL PROCEDURE  Procedure: Mastectomy, bilateral, skin sparing  Findings and Treatment: injection bilateral breasts with magtrace for left breast cancer

## 2024-05-14 NOTE — DISCHARGE NOTE PROVIDER - NSDCFUSCHEDAPPT_GEN_ALL_CORE_FT
Hermann Rowland  [facility] Physician [facility]  INTMED  [facility]  Scheduled Appointment: 05/14/2024    Guevara Brennan  [facility]  PLASTIC[address]  Scheduled Appointment: 05/20/2024    Palleschi, Susan M  [facility]  [address]AST[address]  Scheduled Appointment: 05/28/2024     Guevara Brennan  [facility]  PLAST[address]  Scheduled Appointment: 05/20/2024    Palleschi, Susan M  [facility]  [address]ASTIC[address]  Scheduled Appointment: 05/28/2024

## 2024-05-14 NOTE — DISCHARGE NOTE NURSING/CASE MANAGEMENT/SOCIAL WORK - NSDCPEFALRISK_GEN_ALL_CORE
For information on Fall & Injury Prevention, visit: [email or web address] OR  [email or web address] OR  https://www.cdc.gov/steadi/patient.html

## 2024-05-14 NOTE — DISCHARGE NOTE PROVIDER - CARE PROVIDER_API CALL
Guevara Brennan  Plastic Surgery  [address] [place] [address], [address]  [place] [address]  Phone: [phone]  Fax: [phone]  Scheduled Appointment: 05/20/2024

## 2024-05-14 NOTE — DISCHARGE NOTE PROVIDER - NSDCFUADDINST_GEN_ALL_CORE_FT
SLEEP ON BACK   EMPTY AND RECORD DRAINAGE TWICE A DAY OR AS NEEDED   INCENTIVE SPIROMETER SEVERAL TIMES A DAY WHILE AWAKE   AMBULATE AS TOLERATED BUT RESTING IS ALSO IMPORTANT   NO UPPER EXTREMITY ABDUCTION > than 90 DEGREES   MAY SHOWER ON 5/17   WEAR BRA AT ALL TIMES   TAKE PAIN MEDICATION AS PRESCRIBED BY DR BRENNAN S OFFICE  - oxycodone for severe pain, tylenol for mild pain alternate with oxycodone to lessen taking a narcotic   ASA 81 mg twice a day for 3-4 weeks confirm length of time with Dr Brennan   Take any other medication as prescribed or directed   Follow up with dr Brennan on 5/20 SLEEP ON BACK   EMPTY AND RECORD DRAINAGE TWICE A DAY OR AS NEEDED   INCENTIVE SPIROMETER SEVERAL TIMES A DAY WHILE AWAKE   AMBULATE AS TOLERATED BUT RESTING IS ALSO IMPORTANT   NO UPPER EXTREMITY ABDUCTION > than 90 DEGREES   MAY SHOWER ON 5/17   WEAR BRA AT ALL TIMES   TAKE PAIN MEDICATION AS PRESCRIBED BY DR BRENNAN S OFFICE  - oxycodone for severe pain, tylenol for mild pain alternate with oxycodone to lessen taking a narcotic   ASA 81 mg twice a day for 3-4 weeks confirm length of time with Dr Brennan   Take any other medication as prescribed or directed   Follow up with dr Brennan on 5/20  Apply bacitracin to left upper skin abrasion

## 2024-05-14 NOTE — DISCHARGE NOTE PROVIDER - NSDCMRMEDTOKEN_GEN_ALL_CORE_FT
levothyroxine 88 mcg (0.088 mg) oral capsule: 1 cap(s) orally once a day  mvi:   olmesartan 5 mg oral tablet: 2 tab(s) orally once a day   bacitracin 500 units/g topical ointment: 1 Apply topically to affected area 3 times a day  levothyroxine 88 mcg (0.088 mg) oral capsule: 1 cap(s) orally once a day  losartan 25 mg oral tablet: 1 tab(s) orally once a day  olmesartan 5 mg oral tablet: 2 tab(s) orally once a day

## 2024-05-14 NOTE — DISCHARGE NOTE PROVIDER - NSDCCPCAREPLAN_GEN_ALL_CORE_FT
PRINCIPAL DISCHARGE DIAGNOSIS  Diagnosis: Breast cancer, left  Assessment and Plan of Treatment: scheduled for bilateral mastectomies /DANY flap reconstruction

## 2024-05-14 NOTE — DISCHARGE NOTE NURSING/CASE MANAGEMENT/SOCIAL WORK - PATIENT PORTAL LINK FT
You can access the FollowMyHealth Patient Portal offered by [facility] by registering at the following website: [email or web address]. By joining [employer]’s FollowMyHealth portal, you will also be able to view your health information using other applications (apps) compatible with our system.

## 2024-05-15 VITALS
SYSTOLIC BLOOD PRESSURE: 123 MMHG | TEMPERATURE: 98 F | OXYGEN SATURATION: 98 % | RESPIRATION RATE: 17 BRPM | DIASTOLIC BLOOD PRESSURE: 78 MMHG | HEART RATE: 81 BPM

## 2024-05-15 PROCEDURE — C1781: CPT

## 2024-05-15 PROCEDURE — 36415 COLL VENOUS BLD VENIPUNCTURE: CPT

## 2024-05-15 PROCEDURE — C9399: CPT

## 2024-05-15 PROCEDURE — 88305 TISSUE EXAM BY PATHOLOGIST: CPT

## 2024-05-15 PROCEDURE — 85027 COMPLETE CBC AUTOMATED: CPT

## 2024-05-15 PROCEDURE — C1889: CPT

## 2024-05-15 PROCEDURE — 83735 ASSAY OF MAGNESIUM: CPT

## 2024-05-15 PROCEDURE — 88307 TISSUE EXAM BY PATHOLOGIST: CPT

## 2024-05-15 PROCEDURE — 84100 ASSAY OF PHOSPHORUS: CPT

## 2024-05-15 PROCEDURE — 80048 BASIC METABOLIC PNL TOTAL CA: CPT

## 2024-05-15 RX ORDER — LOSARTAN POTASSIUM 100 MG/1
1 TABLET, FILM COATED ORAL
Qty: 0 | Refills: 0 | DISCHARGE
Start: 2024-05-15

## 2024-05-15 RX ORDER — BACITRACIN ZINC 500 UNIT/G
1 OINTMENT IN PACKET (EA) TOPICAL THREE TIMES A DAY
Refills: 0 | Status: DISCONTINUED | OUTPATIENT
Start: 2024-05-15 | End: 2024-05-15

## 2024-05-15 RX ORDER — BACITRACIN ZINC 500 UNIT/G
1 OINTMENT IN PACKET (EA) TOPICAL
Qty: 0 | Refills: 0 | DISCHARGE
Start: 2024-05-15

## 2024-05-15 RX ADMIN — Medication 15 MILLIGRAM(S): at 00:25

## 2024-05-15 RX ADMIN — Medication 88 MICROGRAM(S): at 06:31

## 2024-05-15 RX ADMIN — OXYCODONE HYDROCHLORIDE 10 MILLIGRAM(S): 5 TABLET ORAL at 13:30

## 2024-05-15 RX ADMIN — Medication 650 MILLIGRAM(S): at 11:00

## 2024-05-15 RX ADMIN — Medication 15 MILLIGRAM(S): at 00:11

## 2024-05-15 RX ADMIN — OXYCODONE HYDROCHLORIDE 10 MILLIGRAM(S): 5 TABLET ORAL at 03:17

## 2024-05-15 RX ADMIN — Medication 15 MILLIGRAM(S): at 11:30

## 2024-05-15 RX ADMIN — Medication 650 MILLIGRAM(S): at 00:10

## 2024-05-15 RX ADMIN — Medication 15 MILLIGRAM(S): at 11:00

## 2024-05-15 RX ADMIN — OXYCODONE HYDROCHLORIDE 10 MILLIGRAM(S): 5 TABLET ORAL at 04:17

## 2024-05-15 RX ADMIN — Medication 650 MILLIGRAM(S): at 06:46

## 2024-05-15 RX ADMIN — OXYCODONE HYDROCHLORIDE 5 MILLIGRAM(S): 5 TABLET ORAL at 06:46

## 2024-05-15 RX ADMIN — OXYCODONE HYDROCHLORIDE 5 MILLIGRAM(S): 5 TABLET ORAL at 06:31

## 2024-05-15 RX ADMIN — Medication 1 APPLICATION(S): at 13:31

## 2024-05-15 RX ADMIN — Medication 650 MILLIGRAM(S): at 00:25

## 2024-05-15 RX ADMIN — Medication 15 MILLIGRAM(S): at 06:32

## 2024-05-15 RX ADMIN — ENOXAPARIN SODIUM 40 MILLIGRAM(S): 100 INJECTION SUBCUTANEOUS at 06:36

## 2024-05-15 RX ADMIN — Medication 650 MILLIGRAM(S): at 11:30

## 2024-05-15 RX ADMIN — OXYCODONE HYDROCHLORIDE 10 MILLIGRAM(S): 5 TABLET ORAL at 14:00

## 2024-05-15 RX ADMIN — Medication 15 MILLIGRAM(S): at 06:46

## 2024-05-15 RX ADMIN — LOSARTAN POTASSIUM 25 MILLIGRAM(S): 100 TABLET, FILM COATED ORAL at 06:31

## 2024-05-15 RX ADMIN — Medication 650 MILLIGRAM(S): at 06:31

## 2024-05-15 NOTE — PROGRESS NOTE ADULT - ASSESSMENT
s/p bilateral skin sparing mastectomy and DANY flap breast reconstruction, POD 1  plan:  oob to chair  regular diet  encourage water intake  cont DVT chemoppx  cont flap monitoring  encourage incentive spirometer
53F PMHx SAMANTHA uses occasional dental device, HTN, hypothyroid presents to PST for preoperative diagnosis of Intraductal carcinoma in situ of left breast. POD#0 s/p Bilateral Mastectomy Breast reconstruction with DANY free flap.     Plan:  -Serial Cook Dopplers.   -serial flap assessment for signs of perfusion  -skin assessment for color, firmness, signs of hematoma or other changes  -abdominal incisions site  monitoring  -hourly checks on UNIQUE drain output  -Analgesia PRN.   -Am labs.   -have discussed case with eICU team, including attending physician  -any and all changes or concerns regarding DANY procedure, flap, etc will be immediately addressed with primary surgical team. 
54 year old female with hx of SAMANTHA, hypertension , hypothyroid is stable POD #2 S/P Bilateral nipple sparing mastectomies/ DANY flap reconstruction     Discussed with Dr Brennan And Dr Palleschi    Plan:   Post op orders reviewed with patient             Discharge to home             F/u appointment arranged 
54F now POD#1 s/p b/l skin-sparing mastectomies w/ b/l DANY reconstruction  - Reg diet  - Supportive bra  - Monitor drain output  - Encourage water intake  - Encourage IS use, OOB  - Drain education  - Management as per plastic surgery  
Assessment  1. Breast cancer s/p DANY 5/13/24  2. Underlying SAMANTHA, HTN, Hypothyroid    Plan  Transition care from ICU to Surgical team  Pain control  d/c [place] GINGER  advance pain  pain control, wound care as per surgery  on a/c  d/w Surgical acp
s/p bilateral breast reconstruction with DANY flaps, POD 2  plan:  d/c home  sleep on back  f/u May20 at [address] [facility] [address], [place]  encourage water and incentive spirometer  ok to shower on 5/16/24  prescriptions given preop  no heavy lifting  empty drains bid  no upper extremity abduction >90 degrees

## 2024-05-15 NOTE — PROGRESS NOTE ADULT - SUBJECTIVE AND OBJECTIVE BOX
Addendum to H&P/ICU Consult note  - Patient seen and examined today    ICU CONSULT  Location of Patient :  2SUR [id number] ([facility] 2SUR)      Initial HPI on admission:  HPI:  53 year old Female with PMHx of SAMANTHA (non compliant to dental device), HTN, hypothyroid where underwent Breast reconstruction with DANY free flap with Partial rib resection with Excision, lymph node, internal mammary on 5/13/24     BRIEF HOSPITAL COURSE:   today feels well  pain controlled   feels sore on chest  no plalp, no cp, sob, cough, secretions      PAST MEDICAL & SURGICAL HISTORY:  Hypothyroidism  Fibroids  Cold thyroid nodule  Obese  Chronic back pain  Polyp of corpus uteri  Bilateral chronic knee pain  HTN (hypertension)  Intraductal carcinoma in situ of left breast  H/O myomectomy 2006, 2012  History of subtotal thyroidectomy 2005  H/O arthroscopy of shoulder right, 2012  S/P D&C (status post dilation and curettage) with polypectomy, 05/21/21     Allergies    No Known Allergies         FAMILY HISTORY:  Family history of hypertension (Father)  Family history of congestive heart failure (Father)  Family history of diabetic complications (Sibling, Sibling)    Social History:     Smoking: denies      Drinking: denies      Drug use: denies     Review of Systems: as stated above         Medications:  MEDICATIONS  (STANDING):  acetaminophen     Tablet .. 650 milliGRAM(s) Oral every 6 hours  acetaminophen   IVPB .. 1000 milliGRAM(s) IV Intermittent every 6 hours  enoxaparin Injectable 40 milliGRAM(s) SubCutaneous every 24 hours  ketorolac   Injectable 15 milliGRAM(s) IV Push every 6 hours  lactated ringers. 1000 milliLiter(s) (125 mL/Hr) IV Continuous <Continuous>  levothyroxine 88 MICROGram(s) Oral daily  losartan 25 milliGRAM(s) Oral daily  senna 2 Tablet(s) Oral at bedtime    MEDICATIONS  (PRN):  oxyCODONE    IR 5 milliGRAM(s) Oral every 4 hours PRN Moderate Pain (4 - 6)  oxyCODONE    IR 10 milliGRAM(s) Oral every 4 hours PRN Severe Pain (7 - 10)      Antibiotics History  ceFAZolin   IVPB 2000 milliGRAM(s) IV Intermittent every 8 hours, 05-13-24 @ 19:38, Stop order after: 2 Doses      Heme Medications   enoxaparin Injectable 40 milliGRAM(s) SubCutaneous every 24 hours, 05-14-24 @ 00:00      GI Medications  senna 2 Tablet(s) Oral at bedtime, 05-13-24 @ 19:38, Routine        Home Medications:  Last Order Reconciliation Date: 05-13-24 (Admission Reconciliation)  levothyroxine 88 mcg (0.088 mg) oral capsule: 1 cap(s) orally once a day (05-13-24)  olmesartan 5 mg oral tablet: 2 tab(s) orally once a day (05-13-24)        LABS:                        11.6   8.35  )-----------( 166      ( 14 May 2024 07:48 )             34.9     05-14    145  |  108  |  16  ----------------------------<  103<H>  4.0   |  31  |  1.29    Ca    8.2<L>      14 May 2024 07:48  Phos  4.4     05-14  Mg     1.9     05-14         VITALS:  T(C): 37.2 (05-14-24 @ 06:05), Max: 37.2 (05-14-24 @ 06:05)  T(F): 98.9 (05-14-24 @ 06:05), Max: 98.9 (05-14-24 @ 06:05)  HR: 71 (05-14-24 @ 08:18) (70 - 99)  BP: 122/72 (05-14-24 @ 08:18) (96/63 - 122/72)  BP(mean): --  ABP: --  ABP(mean): --  RR: 16 (05-14-24 @ 08:18) (12 - 19)  SpO2: 97% (05-14-24 @ 08:18) (97% - 100%)  CVP(mm Hg): --  CVP(cm H2O): --    Ins and Outs     05-13-24 @ 07:01  -  05-14-24 @ 07:00  --------------------------------------------------------  IN: 739 mL / OUT: 1493 mL / NET: -754 mL        Height (cm): 168.9 (05-13-24 @ 08:18)  Weight (kg): 100.2 (05-13-24 @ 08:18)  BMI (kg/m2): 35.1 (05-13-24 @ 08:18)        I&O's Detail    13 May 2024 07:01  -  14 May 2024 07:00  --------------------------------------------------------  IN:    IV PiggyBack: 100 mL    Lactated Ringers: 166 mL    Oral Fluid: 473 mL  Total IN: 739 mL    OUT:    Bulb (mL): 62 mL    Bulb (mL): 65 mL    Bulb (mL): 51 mL    Bulb (mL): 70 mL    Indwelling Catheter - Urethral (mL): 1245 mL  Total OUT: 1493 mL    Total NET: -754 mL          Physical Examination:  GENERAL:               Alert, Oriented, No acute distress.    HEENT:                      No JVD, Moist MM  PULM:                     Bilateral air entry, Clear to auscultation bilaterally, no significant sputum production, No Rales, No Rhonchi, No Wheezing  CVS:                         S1, S2,  No Murmur  ABD:                        Soft, nondistended, nontender, normoactive bowel sounds,   EXT:                         No edema, nontender, No Cyanosis or Clubbing   Vascular:                Warm Extremities, Normal Capillary refill, Normal Distal Pulses  SKIN:                      ischion sites intact, no ecchymosis , breast and islands intact.   NEURO:                  Alert, oriented, interactive, nonfocal, follows commands  PSYC:                      Calm, + Insight.      
HPI:  53F PMHx SAMANTHA uses occasional dental device, HTN, hypothyroid presents to PST for preoperative diagnosis of Intraductal carcinoma in situ of left breast with Dr. Susan Palleschi scheduled for 05/13/2024. Reports having her scheduled mammogram, had a MRI, abnormal lesion noted, had biopsy which came back positive for malignancy, however BRCA negative.     Interval Hx:  -POD#0 s/p Bilateral Mastectomy Breast reconstruction with DANY free flap. Cook dopplers present.  -Pain controlled. Satting well on NC.     PAST MEDICAL & SURGICAL HISTORY:  Hypothyroidism  Fibroids  Cold thyroid nodule  Obese  Chronic back pain  Polyp of corpus uteri  Bilateral chronic knee pain  HTN (hypertension)  Intraductal carcinoma in situ of left breast  H/O myomectomy  2006, 2012  History of subtotal thyroidectomy  2005  H/O arthroscopy of shoulder  right, 2012  S/P D&C (status post dilation and curettage)  with polypectomy, 05/21/21  MEDICATIONS  (STANDING):  acetaminophen   IVPB .. 1000 milliGRAM(s) IV Intermittent every 6 hours  ketorolac   Injectable 15 milliGRAM(s) IV Push every 6 hours    MEDICATIONS  (PRN):    Review of Systems:  CONSTITUTIONAL: No fever, chills, or fatigue  EYES: No eye pain, visual disturbances, or discharge  ENMT:  No difficulty hearing, tinnitus, vertigo; No sinus or throat pain  NECK: No pain or stiffness  RESPIRATORY: No cough, wheezing, chills or hemoptysis; No shortness of breath  CARDIOVASCULAR: No chest pain, palpitations, dizziness, or leg swelling  GASTROINTESTINAL: No abdominal or epigastric pain. No nausea, vomiting, or hematemesis; No diarrhea or constipation. No melena or hematochezia.  GENITOURINARY: No dysuria, frequency, hematuria, or incontinence  NEUROLOGICAL: No headaches, memory loss, loss of strength, numbness, or tremors  SKIN: No itching, burning, rashes, or lesions   MUSCULOSKELETAL: No joint pain or swelling; No muscle, back, or extremity pain  PSYCHIATRIC: No depression, anxiety, mood swings, or difficulty sleeping    ICU Vital Signs Last 24 Hrs  T(C): 37 (13 May 2024 19:00), Max: 37 (13 May 2024 19:00)  T(F): 98.6 (13 May 2024 19:00), Max: 98.6 (13 May 2024 19:00)  HR: 77 (13 May 2024 19:00) (64 - 99)  BP: 109/69 (13 May 2024 19:32) (99/70 - 158/92)  BP(mean): --  ABP: --  ABP(mean): --  RR: 15 (13 May 2024 19:32) (12 - 19)  SpO2: 100% (13 May 2024 19:32) (97% - 100%)  O2 Parameters below as of 13 May 2024 19:32  Patient On (Oxygen Delivery Method): nasal cannula  O2 Flow (L/min): 3    Physical Examination:  General: Alert, oriented, interactive, nonfocal  BREAST: breasts appears symmetrical, no signs of hematoma, soft to palpation, non tender, well perfused, turgor maintained, cap refill adequate. Viotpix in place over b/l nipples functioning well. UNIQUE drains in place with serosanguinous drainage.   HEENT: PERRL.   PULM: Clear to auscultation bilaterally.  CVS: s1/s2.   ABD: Soft, nondistended, nontender, normoactive bowel sounds, no masses. Flap removal site well approximated, staples in place. No active drainage, bleeding, or signs of infection.  EXT: No edema, nontender.    Time spent on this patient encounter, which includes documenting this note in the electronic medical record, was +55 minutes including assessing the presenting problems with associated risks, reviewing the medical record to prepare for the encounter, and meeting face to face with the patient to obtain additional history. I have also performed an appropriate physical exam, made interventions listed and ordered and interpreted appropriate diagnostic studies as documented. To improve communication and patient safety, I have coordinated care with the multidisciplinary team including the bedside nurse, appropriate attending of record and consultants as needed. This time is independent of any procedures performed.    Date of entry of this note is equal to the date of services rendered. 
INTERVAL HPI/OVERNIGHT EVENTS:  Pt seen and examined at bedside resting comfortably. No acute events reported overnight. Pain is well controlled with current analgesic regimen. [place] removed this AM.  Denies fever/chills, chest pain, dyspnea, cough, dizziness.     Vital Signs Last 24 Hrs  T(C): 37.2 (14 May 2024 06:05), Max: 37.2 (14 May 2024 06:05)  T(F): 98.9 (14 May 2024 06:05), Max: 98.9 (14 May 2024 06:05)  HR: 71 (14 May 2024 08:18) (70 - 99)  BP: 122/72 (14 May 2024 08:18) (96/63 - 122/72)  BP(mean): --  RR: 16 (14 May 2024 08:18) (12 - 19)  SpO2: 97% (14 May 2024 08:18) (97% - 100%)    Parameters below as of 14 May 2024 08:18  Patient On (Oxygen Delivery Method): room air      PHYSICAL EXAM:  GENERAL: awake and alert, NAD  HEAD: atraumatic, normocephalic  EYES: EOMI  CHEST/LUNG: non-labored breathing   HEART: normal HR  BREASTS: b/l breasts soft, no hematoma or ecchymosis noted, incisions C/D/I b/l, rosalind drains in place  ABDOMEN: soft, appropriately tender to palpation, incision C/D/I, rosalind drains in place with serosang output  EXTREMITIES: no calf tenderness b/l    I&O's Detail    13 May 2024 07:01  -  14 May 2024 07:00  --------------------------------------------------------  IN:    IV PiggyBack: 100 mL    Lactated Ringers: 166 mL    Oral Fluid: 473 mL  Total IN: 739 mL    OUT:    Bulb (mL): 62 mL    Bulb (mL): 65 mL    Bulb (mL): 51 mL    Bulb (mL): 70 mL    Indwelling Catheter - Urethral (mL): 1245 mL  Total OUT: 1493 mL    Total NET: -754 mL          LABS:                        11.6   8.35  )-----------( 166      ( 14 May 2024 07:48 )             34.9     05-14    145  |  108  |  16  ----------------------------<  103<H>  4.0   |  31  |  1.29    Ca    8.2<L>      14 May 2024 07:48  Phos  4.4     05-14  Mg     1.9     05-14        Urinalysis Basic - ( 14 May 2024 07:48 )    Color: x / Appearance: x / SG: x / pH: x  Gluc: 103 mg/dL / Ketone: x  / Bili: x / Urobili: x   Blood: x / Protein: x / Nitrite: x   Leuk Esterase: x / RBC: x / WBC x   Sq Epi: x / Non Sq Epi: x / Bacteria: x  
doing well  no cp, no sob, no fevers, no calf pain  on exam, both breasts soft. DANY flap viable with good color and cap refill.  audible doppler signal.   no mastectomy ischemia.  abdomen soft. incisions intact. umbilicus viable.  all drain output serosang. all surgical sites soft without hematoma.
no cp, no sob, no fevers, no calf pain  on exam, both breasts soft. no mastectomy ischemia.   DANY flaps viable with good color and cap refill. audible doppler signal.  abdomen soft. incisions intact. umbilicus viable.  all surgical sites soft without evidence of hematoma.
Patient is a 54y old  Female who presents with a chief complaint of Elective sharon (14 May 2024 10:03)    Patient seen and examined at bedside. Patient was sitting up in chair this am.    She appears comfortable but c/o surgical discomfort especially around breast drain sites.    Left > than right .   She also thinks left breast is larger than right .    There is a small difference between them but both breasts are soft, no mastectomy ischemia,  small indurated area left lateral breast but no hematoma , bilateral drains patent and with appropriate drainage.   Abdominal incision intact , no hematomas , viable umbilicus , drains with appropriate drainage.   Cook monitors discontinued .   Post op orders reviewed with patient and daughter.   She will be discharged to home this afternoon . She denies any CP or SOB.   Discussed with Dr Brennan    ALLERGIES:  No Known Allergies    MEDICATIONS  (STANDING):  acetaminophen     Tablet .. 650 milliGRAM(s) Oral every 6 hours  bacitracin   Ointment 1 Application(s) Topical three times a day  enoxaparin Injectable 40 milliGRAM(s) SubCutaneous every 24 hours  ketorolac   Injectable 15 milliGRAM(s) IV Push every 6 hours  lactated ringers. 1000 milliLiter(s) (125 mL/Hr) IV Continuous <Continuous>  levothyroxine 88 MICROGram(s) Oral daily  losartan 25 milliGRAM(s) Oral daily  senna 2 Tablet(s) Oral at bedtime    MEDICATIONS  (PRN):  oxyCODONE    IR 5 milliGRAM(s) Oral every 4 hours PRN Moderate Pain (4 - 6)  oxyCODONE    IR 10 milliGRAM(s) Oral every 4 hours PRN Severe Pain (7 - 10)    Vital Signs Last 24 Hrs  T(F): 98.2 (15 May 2024 05:45), Max: 98.5 (14 May 2024 20:00)  HR: 81 (15 May 2024 05:45) (68 - 82)  BP: 123/78 (15 May 2024 05:45) (121/84 - 128/80)  RR: 17 (15 May 2024 05:45) (16 - 17)  SpO2: 98% (15 May 2024 05:45) (97% - 98%)  I&O's Summary    14 May 2024 07:01  -  15 May 2024 07:00  --------------------------------------------------------  IN: 1374 mL / OUT: 132 mL / NET: 1242 mL    15 May 2024 07:01  -  15 May 2024 13:57  --------------------------------------------------------  IN: 0 mL / OUT: 145 mL / NET: -145 mL      PHYSICAL EXAM:  General: NAD, A/O x 3  ENT: MMM  Neck: Supple, No JVD  Lungs: Clear to auscultation bilaterally  Cardio: RRR, S1/S2, No murmurs  Extremities: No calf tenderness, No pitting edema    LABS:                        11.6   8.35  )-----------( 166      ( 14 May 2024 07:48 )             34.9     05-14    145  |  108  |  16  ----------------------------<  103  4.0   |  31  |  1.29    Ca    8.2      14 May 2024 07:48  Phos  4.4     05-14  Mg     1.9     05-14      Urinalysis Basic - ( 14 May 2024 07:48 )    Color: x / Appearance: x / SG: x / pH: x  Gluc: 103 mg/dL / Ketone: x  / Bili: x / Urobili: x   Blood: x / Protein: x / Nitrite: x   Leuk Esterase: x / RBC: x / WBC x   Sq Epi: x / Non Sq Epi: x / Bacteria: x            RADIOLOGY & ADDITIONAL TESTS:    Care Discussed with Consultants/Other Providers:

## 2024-05-16 ENCOUNTER — TRANSCRIPTION ENCOUNTER (OUTPATIENT)
Age: 54
End: 2024-05-16

## 2024-05-17 ENCOUNTER — TRANSCRIPTION ENCOUNTER (OUTPATIENT)
Age: 54
End: 2024-05-17

## 2024-05-17 LAB — SURGICAL PATHOLOGY STUDY: SIGNIFICANT CHANGE UP

## 2024-05-20 ENCOUNTER — NON-APPOINTMENT (OUTPATIENT)
Age: 54
End: 2024-05-20

## 2024-05-20 ENCOUNTER — TRANSCRIPTION ENCOUNTER (OUTPATIENT)
Age: 54
End: 2024-05-20

## 2024-05-20 ENCOUNTER — APPOINTMENT (OUTPATIENT)
Dept: PLASTIC SURGERY | Facility: CLINIC | Age: 54
End: 2024-05-20
Payer: COMMERCIAL

## 2024-05-20 VITALS
HEIGHT: 67 IN | OXYGEN SATURATION: 100 % | SYSTOLIC BLOOD PRESSURE: 133 MMHG | RESPIRATION RATE: 16 BRPM | BODY MASS INDEX: 32.8 KG/M2 | DIASTOLIC BLOOD PRESSURE: 90 MMHG | WEIGHT: 209 LBS | HEART RATE: 89 BPM

## 2024-05-20 DIAGNOSIS — N64.89 OTHER SPECIFIED DISORDERS OF BREAST: ICD-10-CM

## 2024-05-20 PROCEDURE — 99024 POSTOP FOLLOW-UP VISIT: CPT

## 2024-05-20 RX ORDER — CEFADROXIL 500 MG/1
500 CAPSULE ORAL TWICE DAILY
Qty: 14 | Refills: 0 | Status: ACTIVE | COMMUNITY
Start: 2024-05-20 | End: 1900-01-01

## 2024-05-21 ENCOUNTER — RESULT REVIEW (OUTPATIENT)
Age: 54
End: 2024-05-21

## 2024-05-21 ENCOUNTER — INPATIENT (INPATIENT)
Facility: HOSPITAL | Age: 54
LOS: 1 days | Discharge: HOME | DRG: 601 | End: 2024-05-23
Attending: PLASTIC SURGERY | Admitting: PLASTIC SURGERY
Payer: COMMERCIAL

## 2024-05-21 ENCOUNTER — OUTPATIENT (OUTPATIENT)
Dept: OUTPATIENT SERVICES | Facility: HOSPITAL | Age: 54
LOS: 1 days | End: 2024-05-21
Payer: COMMERCIAL

## 2024-05-21 ENCOUNTER — TRANSCRIPTION ENCOUNTER (OUTPATIENT)
Age: 54
End: 2024-05-21

## 2024-05-21 ENCOUNTER — APPOINTMENT (OUTPATIENT)
Dept: ULTRASOUND IMAGING | Facility: IMAGING CENTER | Age: 54
End: 2024-05-21
Payer: COMMERCIAL

## 2024-05-21 ENCOUNTER — APPOINTMENT (OUTPATIENT)
Dept: CARE COORDINATION | Facility: HOME HEALTH | Age: 54
End: 2024-05-21
Payer: COMMERCIAL

## 2024-05-21 ENCOUNTER — APPOINTMENT (OUTPATIENT)
Dept: ULTRASOUND IMAGING | Facility: IMAGING CENTER | Age: 54
End: 2024-05-21

## 2024-05-21 VITALS
RESPIRATION RATE: 16 BRPM | DIASTOLIC BLOOD PRESSURE: 90 MMHG | OXYGEN SATURATION: 99 % | HEART RATE: 86 BPM | TEMPERATURE: 98 F | SYSTOLIC BLOOD PRESSURE: 130 MMHG

## 2024-05-21 VITALS
HEART RATE: 93 BPM | TEMPERATURE: 98 F | DIASTOLIC BLOOD PRESSURE: 85 MMHG | OXYGEN SATURATION: 98 % | RESPIRATION RATE: 18 BRPM | HEIGHT: 66.5 IN | WEIGHT: 229.06 LBS | SYSTOLIC BLOOD PRESSURE: 131 MMHG

## 2024-05-21 DIAGNOSIS — Z98.890 OTHER SPECIFIED POSTPROCEDURAL STATES: Chronic | ICD-10-CM

## 2024-05-21 DIAGNOSIS — Z09 ENCOUNTER FOR FOLLOW-UP EXAMINATION AFTER COMPLETED TREATMENT FOR CONDITIONS OTHER THAN MALIGNANT NEOPLASM: ICD-10-CM

## 2024-05-21 DIAGNOSIS — Z90.13 ACQUIRED ABSENCE OF BILATERAL BREASTS AND NIPPLES: ICD-10-CM

## 2024-05-21 DIAGNOSIS — N64.4 MASTODYNIA: ICD-10-CM

## 2024-05-21 DIAGNOSIS — N64.89 OTHER SPECIFIED DISORDERS OF BREAST: ICD-10-CM

## 2024-05-21 DIAGNOSIS — E03.9 HYPOTHYROIDISM, UNSPECIFIED: ICD-10-CM

## 2024-05-21 DIAGNOSIS — Z90.13 ACQUIRED ABSENCE OF BILATERAL BREASTS AND NIPPLES: Chronic | ICD-10-CM

## 2024-05-21 DIAGNOSIS — N61.0 MASTITIS WITHOUT ABSCESS: ICD-10-CM

## 2024-05-21 DIAGNOSIS — I10 ESSENTIAL (PRIMARY) HYPERTENSION: ICD-10-CM

## 2024-05-21 LAB
ALBUMIN SERPL ELPH-MCNC: 2.5 G/DL — LOW (ref 3.3–5)
ALP SERPL-CCNC: 59 U/L — SIGNIFICANT CHANGE UP (ref 40–120)
ALT FLD-CCNC: 21 U/L — SIGNIFICANT CHANGE UP (ref 10–45)
ANION GAP SERPL CALC-SCNC: 3 MMOL/L — LOW (ref 5–17)
ANION GAP SERPL CALC-SCNC: 5 MMOL/L — SIGNIFICANT CHANGE UP (ref 5–17)
APTT BLD: 29.6 SEC — SIGNIFICANT CHANGE UP (ref 24.5–35.6)
AST SERPL-CCNC: 50 U/L — HIGH (ref 10–40)
BASOPHILS # BLD AUTO: 0.02 K/UL — SIGNIFICANT CHANGE UP (ref 0–0.2)
BASOPHILS NFR BLD AUTO: 0.3 % — SIGNIFICANT CHANGE UP (ref 0–2)
BILIRUB SERPL-MCNC: 0.3 MG/DL — SIGNIFICANT CHANGE UP (ref 0.2–1.2)
BLD GP AB SCN SERPL QL: SIGNIFICANT CHANGE UP
BUN SERPL-MCNC: 16 MG/DL — SIGNIFICANT CHANGE UP (ref 7–23)
BUN SERPL-MCNC: 17 MG/DL — SIGNIFICANT CHANGE UP (ref 7–23)
CALCIUM SERPL-MCNC: 8.5 MG/DL — SIGNIFICANT CHANGE UP (ref 8.4–10.5)
CALCIUM SERPL-MCNC: 8.7 MG/DL — SIGNIFICANT CHANGE UP (ref 8.4–10.5)
CHLORIDE SERPL-SCNC: 105 MMOL/L — SIGNIFICANT CHANGE UP (ref 96–108)
CHLORIDE SERPL-SCNC: 106 MMOL/L — SIGNIFICANT CHANGE UP (ref 96–108)
CO2 SERPL-SCNC: 30 MMOL/L — SIGNIFICANT CHANGE UP (ref 22–31)
CO2 SERPL-SCNC: 32 MMOL/L — HIGH (ref 22–31)
CREAT SERPL-MCNC: 0.84 MG/DL — SIGNIFICANT CHANGE UP (ref 0.5–1.3)
CREAT SERPL-MCNC: 1.1 MG/DL — SIGNIFICANT CHANGE UP (ref 0.5–1.3)
EGFR: 59 ML/MIN/1.73M2 — LOW
EGFR: 82 ML/MIN/1.73M2 — SIGNIFICANT CHANGE UP
EOSINOPHIL # BLD AUTO: 0.15 K/UL — SIGNIFICANT CHANGE UP (ref 0–0.5)
EOSINOPHIL NFR BLD AUTO: 2.6 % — SIGNIFICANT CHANGE UP (ref 0–6)
GLUCOSE SERPL-MCNC: 108 MG/DL — HIGH (ref 70–99)
GLUCOSE SERPL-MCNC: 88 MG/DL — SIGNIFICANT CHANGE UP (ref 70–99)
HCT VFR BLD CALC: 36.7 % — SIGNIFICANT CHANGE UP (ref 34.5–45)
HGB BLD-MCNC: 11.8 G/DL — SIGNIFICANT CHANGE UP (ref 11.5–15.5)
IMM GRANULOCYTES NFR BLD AUTO: 0.3 % — SIGNIFICANT CHANGE UP (ref 0–0.9)
INR BLD: 1.04 RATIO — SIGNIFICANT CHANGE UP (ref 0.85–1.18)
LACTATE SERPL-SCNC: 1.6 MMOL/L — SIGNIFICANT CHANGE UP (ref 0.7–2)
LYMPHOCYTES # BLD AUTO: 1.17 K/UL — SIGNIFICANT CHANGE UP (ref 1–3.3)
LYMPHOCYTES # BLD AUTO: 20 % — SIGNIFICANT CHANGE UP (ref 13–44)
MCHC RBC-ENTMCNC: 27.1 PG — SIGNIFICANT CHANGE UP (ref 27–34)
MCHC RBC-ENTMCNC: 32.2 GM/DL — SIGNIFICANT CHANGE UP (ref 32–36)
MCV RBC AUTO: 84.4 FL — SIGNIFICANT CHANGE UP (ref 80–100)
MONOCYTES # BLD AUTO: 0.38 K/UL — SIGNIFICANT CHANGE UP (ref 0–0.9)
MONOCYTES NFR BLD AUTO: 6.5 % — SIGNIFICANT CHANGE UP (ref 2–14)
NEUTROPHILS # BLD AUTO: 4.1 K/UL — SIGNIFICANT CHANGE UP (ref 1.8–7.4)
NEUTROPHILS NFR BLD AUTO: 70.3 % — SIGNIFICANT CHANGE UP (ref 43–77)
NRBC # BLD: 0 /100 WBCS — SIGNIFICANT CHANGE UP (ref 0–0)
PLATELET # BLD AUTO: 285 K/UL — SIGNIFICANT CHANGE UP (ref 150–400)
POTASSIUM SERPL-MCNC: 3.7 MMOL/L — SIGNIFICANT CHANGE UP (ref 3.5–5.3)
POTASSIUM SERPL-MCNC: 5.5 MMOL/L — HIGH (ref 3.5–5.3)
POTASSIUM SERPL-SCNC: 3.7 MMOL/L — SIGNIFICANT CHANGE UP (ref 3.5–5.3)
POTASSIUM SERPL-SCNC: 5.5 MMOL/L — HIGH (ref 3.5–5.3)
PROT SERPL-MCNC: 6.7 G/DL — SIGNIFICANT CHANGE UP (ref 6–8.3)
PROTHROM AB SERPL-ACNC: 12.1 SEC — SIGNIFICANT CHANGE UP (ref 9.5–13)
RBC # BLD: 4.35 M/UL — SIGNIFICANT CHANGE UP (ref 3.8–5.2)
RBC # FLD: 15.4 % — HIGH (ref 10.3–14.5)
SODIUM SERPL-SCNC: 140 MMOL/L — SIGNIFICANT CHANGE UP (ref 135–145)
SODIUM SERPL-SCNC: 141 MMOL/L — SIGNIFICANT CHANGE UP (ref 135–145)
WBC # BLD: 5.84 K/UL — SIGNIFICANT CHANGE UP (ref 3.8–10.5)
WBC # FLD AUTO: 5.84 K/UL — SIGNIFICANT CHANGE UP (ref 3.8–10.5)

## 2024-05-21 PROCEDURE — 19000 PUNCTURE ASPIR CYST BREAST: CPT | Mod: LT

## 2024-05-21 PROCEDURE — 76641 ULTRASOUND BREAST COMPLETE: CPT

## 2024-05-21 PROCEDURE — 99495 TRANSJ CARE MGMT MOD F2F 14D: CPT

## 2024-05-21 PROCEDURE — 76641 ULTRASOUND BREAST COMPLETE: CPT | Mod: 26,50

## 2024-05-21 PROCEDURE — 76942 ECHO GUIDE FOR BIOPSY: CPT

## 2024-05-21 PROCEDURE — 88173 CYTOPATH EVAL FNA REPORT: CPT

## 2024-05-21 PROCEDURE — 93010 ELECTROCARDIOGRAM REPORT: CPT

## 2024-05-21 PROCEDURE — 88173 CYTOPATH EVAL FNA REPORT: CPT | Mod: 26

## 2024-05-21 PROCEDURE — 19000 PUNCTURE ASPIR CYST BREAST: CPT

## 2024-05-21 PROCEDURE — 99285 EMERGENCY DEPT VISIT HI MDM: CPT

## 2024-05-21 PROCEDURE — 88305 TISSUE EXAM BY PATHOLOGIST: CPT

## 2024-05-21 PROCEDURE — 88305 TISSUE EXAM BY PATHOLOGIST: CPT | Mod: 26

## 2024-05-21 PROCEDURE — 76942 ECHO GUIDE FOR BIOPSY: CPT | Mod: 26,59

## 2024-05-21 RX ORDER — INSULIN HUMAN 100 [IU]/ML
5 INJECTION, SOLUTION SUBCUTANEOUS ONCE
Refills: 0 | Status: DISCONTINUED | OUTPATIENT
Start: 2024-05-21 | End: 2024-05-21

## 2024-05-21 RX ORDER — LEVOTHYROXINE SODIUM 125 MCG
88 TABLET ORAL DAILY
Refills: 0 | Status: DISCONTINUED | OUTPATIENT
Start: 2024-05-22 | End: 2024-05-22

## 2024-05-21 RX ORDER — DEXTROSE 50 % IN WATER 50 %
50 SYRINGE (ML) INTRAVENOUS ONCE
Refills: 0 | Status: DISCONTINUED | OUTPATIENT
Start: 2024-05-21 | End: 2024-05-21

## 2024-05-21 RX ORDER — VANCOMYCIN HCL 1 G
1500 VIAL (EA) INTRAVENOUS EVERY 12 HOURS
Refills: 0 | Status: DISCONTINUED | OUTPATIENT
Start: 2024-05-22 | End: 2024-05-22

## 2024-05-21 RX ORDER — PIPERACILLIN AND TAZOBACTAM 4; .5 G/20ML; G/20ML
3.38 INJECTION, POWDER, LYOPHILIZED, FOR SOLUTION INTRAVENOUS EVERY 8 HOURS
Refills: 0 | Status: DISCONTINUED | OUTPATIENT
Start: 2024-05-22 | End: 2024-05-22

## 2024-05-21 RX ORDER — VANCOMYCIN HCL 1 G
1000 VIAL (EA) INTRAVENOUS ONCE
Refills: 0 | Status: COMPLETED | OUTPATIENT
Start: 2024-05-21 | End: 2024-05-21

## 2024-05-21 RX ORDER — SODIUM CHLORIDE 9 MG/ML
1000 INJECTION, SOLUTION INTRAVENOUS
Refills: 0 | Status: DISCONTINUED | OUTPATIENT
Start: 2024-05-22 | End: 2024-05-22

## 2024-05-21 RX ORDER — OXYCODONE HYDROCHLORIDE 5 MG/1
10 TABLET ORAL EVERY 4 HOURS
Refills: 0 | Status: DISCONTINUED | OUTPATIENT
Start: 2024-05-21 | End: 2024-05-22

## 2024-05-21 RX ORDER — OXYCODONE HYDROCHLORIDE 5 MG/1
5 TABLET ORAL EVERY 4 HOURS
Refills: 0 | Status: DISCONTINUED | OUTPATIENT
Start: 2024-05-21 | End: 2024-05-22

## 2024-05-21 RX ORDER — PIPERACILLIN AND TAZOBACTAM 4; .5 G/20ML; G/20ML
3.38 INJECTION, POWDER, LYOPHILIZED, FOR SOLUTION INTRAVENOUS ONCE
Refills: 0 | Status: COMPLETED | OUTPATIENT
Start: 2024-05-21 | End: 2024-05-21

## 2024-05-21 RX ORDER — SODIUM ZIRCONIUM CYCLOSILICATE 10 G/10G
10 POWDER, FOR SUSPENSION ORAL ONCE
Refills: 0 | Status: DISCONTINUED | OUTPATIENT
Start: 2024-05-21 | End: 2024-05-21

## 2024-05-21 RX ORDER — ACETAMINOPHEN 500 MG
650 TABLET ORAL EVERY 6 HOURS
Refills: 0 | Status: DISCONTINUED | OUTPATIENT
Start: 2024-05-22 | End: 2024-05-22

## 2024-05-21 RX ORDER — ACETAMINOPHEN 500 MG
1000 TABLET ORAL ONCE
Refills: 0 | Status: COMPLETED | OUTPATIENT
Start: 2024-05-21 | End: 2024-05-21

## 2024-05-21 RX ADMIN — PIPERACILLIN AND TAZOBACTAM 3.38 GRAM(S): 4; .5 INJECTION, POWDER, LYOPHILIZED, FOR SOLUTION INTRAVENOUS at 18:00

## 2024-05-21 RX ADMIN — Medication 1000 MILLIGRAM(S): at 18:50

## 2024-05-21 RX ADMIN — Medication 400 MILLIGRAM(S): at 17:25

## 2024-05-21 RX ADMIN — Medication 250 MILLIGRAM(S): at 17:53

## 2024-05-21 RX ADMIN — Medication 1000 MILLIGRAM(S): at 17:50

## 2024-05-21 RX ADMIN — Medication 1000 MILLIGRAM(S): at 18:01

## 2024-05-21 RX ADMIN — PIPERACILLIN AND TAZOBACTAM 200 GRAM(S): 4; .5 INJECTION, POWDER, LYOPHILIZED, FOR SOLUTION INTRAVENOUS at 17:25

## 2024-05-21 NOTE — H&P ADULT - NSICDXPASTSURGICALHX_GEN_ALL_CORE_FT
PAST SURGICAL HISTORY:  H/O arthroscopy of shoulder right, 2012    H/O bilateral mastectomy     H/O breast reconstruction     H/O myomectomy 2006, 2012    History of subtotal thyroidectomy 2005    S/P D&C (status post dilation and curettage) with polypectomy, 05/21/21

## 2024-05-21 NOTE — H&P ADULT - ASSESSMENT
Assessment: 55 yo F w/ pmh SAMANTHA, HTN, Hypothyroid w/ recent admission 5/13 for planned procedure bilateral mastectomy w/ bilateral DANY flap reconstruction w/ Dr. Brennan presenting with bilateral breast pain and swelling since Monday. Drains removed last week. Pt went for US and seroma drainage today w/ radiology and sent to ED by Dr. Brennan for OR tomorrow. Pt AFVSS. Swelling and pain noted on exam, flaps viable. Wbc wnl, hgb stable.    Plan:  - Plan for OR tomorrow AM w/ Dr. Brennan for I&D of bilateral breast, replacement of drains  - Consent complete  - Reg diet, NPO after MN  - Vanco/Zosyn  - F/u cultures  - T&S in AM  - C/w levothyroxine  - Monitor BP in house, pt states takes Olmesartan 5mg QD, will hold for now prior to OR  - Hold dvt ppx for OR, SCDs    Seen and discussed w/ Dr. Brennan  Plastic Surgery

## 2024-05-21 NOTE — ED ADULT NURSE NOTE - OBJECTIVE STATEMENT
patient A&Ox4 RA, pt came in from outpatient MD with b/l breast tenderness and pain 3/10, s/p b/l breast sx x8 days ago, b/l breast drains removed yesterday with needle aspiration, b/l abdominal drains remain intact with minimal drainage, abdominal and breast suture lines remains intact without drainage, saftey measures maintained, bed alarm inpalce

## 2024-05-21 NOTE — ED PROVIDER NOTE - NSICDXPASTMEDICALHX_GEN_ALL_CORE_FT
PAST MEDICAL HISTORY:  Bilateral chronic knee pain     Chronic back pain     Cold thyroid nodule     Fibroids     HTN (hypertension)     Hypothyroidism     Intraductal carcinoma in situ of left breast     Obese     Polyp of corpus uteri

## 2024-05-21 NOTE — ED ADULT NURSE NOTE - NSFALLHARMRISKINTERV_ED_ALL_ED

## 2024-05-21 NOTE — PATIENT PROFILE ADULT - FALL HARM RISK - ATTEMPT OOB
Called pt relayed results verbalized understanding no further questions  CT ordered for one year     ----- Message from Jose Diaz MD sent at 5/28/2020  8:41 AM CDT -----  The CT shows that the dimension of the aneurysm is not as big as the ultrasound - the CT is a more detailed test.    The aneurysm does not need to be fixed now. It has only grown a small amount compared to 2015.    Since ultrasound has consistently overestimated the size for him -- we are going to use CT for surveillance. He should have another CTA abd/pelvis in about one year.     No

## 2024-05-21 NOTE — H&P ADULT - NSHPLABSRESULTS_GEN_ALL_CORE
11.8   5.84  )-----------( 285      ( 21 May 2024 17:15 )             36.7     05-21    140  |  105  |  17  ----------------------------<  88  5.5<H>   |  32<H>  |  0.84    Ca    8.7      21 May 2024 17:15    TPro  6.7  /  Alb  2.5<L>  /  TBili  0.3  /  DBili  x   /  AST  50<H>  /  ALT  21  /  AlkPhos  59  05-21

## 2024-05-21 NOTE — ED PROVIDER NOTE - CLINICAL SUMMARY MEDICAL DECISION MAKING FREE TEXT BOX
54-year-old female presents to the emergency department with bilateral breast pain and swelling with redness.  Sent for admission for IV antibiotics and possible drainage.  Patient has history of hypertension hypothyroidism and says she had DCIS of the breast.  Of the options, patient decided to perform bilateral mastectomy w reconstruction (b/l DANY).  Patient states she had surgery last week Monday however since then the drains have been removed yesterday.  Patient notes that she went today for ultrasound and fluid aspiration and then was directed to the emergency department for admission.  Dr. Brennan is the sending operating physician.  Patient reporting pain.  No documented fever.  Exam as stated. Plan for labs with cultures. IV abx. Discussed with surgical PA. Plan for admission. NPO after midnight.

## 2024-05-21 NOTE — H&P ADULT - HISTORY OF PRESENT ILLNESS
53yo F w/ pmh SAMANTHA, HTN, Hypothyroid, hx breast ca with recent surgical hx of bilateral mastectomy and bilateral DANY reconstruction 5/13/24 w/ Dr. Brennan now presenting to ED with bilateral breast pain accompanied by swelling and redness. Patient had drains removed end of last week in office with Dr. Brennan, and then Monday pain and swelling noted. Pt was sent today for seroma aspiration by radiology and serosanguinous fluid was drained, then sent into ED by surgeon for further evaluation. Pt admit to significant pain 7/10 to bilateral breast, L>R. Reports regular post-operative pain to abdomen. No documented fevers.  Pt denies F/C/N/V, cp, sob, dizziness

## 2024-05-21 NOTE — H&P ADULT - NSHPADDITIONALINFOADULT_GEN_ALL_CORE
Patient was seen and examined by myself, Dr. Guevara Brennan on 5/21 in the ER and 5/22 preoperatively.   The patient is well known to me with a history of bilateral DANY flap breast reconstruction on 5/13.  The patient devolved lateral chest wall fullness and left lateral chest wall tenderness and erythema.  She has suspected seroma.  The patient had ultrasound which confirmed seroma.  The patient and I discussed the role of observation. Alternatively exploration with surgical drain replacement was discussed. She would like to proceed with the latter.  Risks were discussed extensively with the patient. The patient provides informed consent.

## 2024-05-21 NOTE — ED PROVIDER NOTE - PHYSICAL EXAMINATION
General:     NAD, well-nourished, well-appearing  Chest: B/L breast with mild swelling, L>R. Tender to touch terry to outer breast on the left. No drainage of fluid.   Lungs:     CTA b/l  CVS:     RRR  Abd:     +BS, s/nt/nd ; lower abd drains are in place. Scar noted lower abd. Clean dry and intact  Ext:   no deformities or edema.  Neuro: AAOx3, no sensory/motor deficits

## 2024-05-21 NOTE — ED ADULT NURSE REASSESSMENT NOTE - NS ED NURSE REASSESS COMMENT FT1
Lab contacted regarding potential hemolysis of specimen. As per lab, Potassium draw at 1715 noted to be hemolyzed. PA informed. New specimens collected as ordered at this time.

## 2024-05-21 NOTE — ED ADULT NURSE NOTE - NSFALLRISKFACTORS_ED_ALL_ED
recent breast sx/Surgery: Recent surgery, recent lower limb amputation, major abdominal or thoracic surgery

## 2024-05-21 NOTE — ED PROVIDER NOTE - OBJECTIVE STATEMENT
54-year-old female presents to the emergency department with bilateral breast pain and swelling with redness.  Sent for admission for IV antibiotics and possible drainage.  Patient has history of hypertension hypothyroidism and says she had DCIS of the breast.  Of the options, patient decided to perform bilateral mastectomy w reconstruction (b/l DANY).  Patient states she had surgery last week Monday however since then the drains have been removed yesterday.  Patient notes that she went today for ultrasound and fluid aspiration and then was directed to the emergency department for admission.  Dr. Brennan is the sending operating physician.  Patient reporting pain.  No documented fever.

## 2024-05-21 NOTE — PATIENT PROFILE ADULT - DO YOU FEEL LIKE HURTING YOURSELF OR OTHERS?
SUBJECTIVE:   Millie Núñez is a 26year old female who presents for her annual well woman exam.   Patient's last menstrual period was 2018. GYNE ROS:   Vaginal symptoms: none. Vulvar symptoms: none. Discharge described as: normal and physiologic. Other associated symptoms: None     ROS:   No headaches, no unexplained chest pain, dyspnea, SOB, abdominal pain, bowel or bladder complaints. Review of all other systems negative. Over the last 2 weeks, how often have you been bothered by the following problems? PHQ2 Score:  0  1. Little interest or pleasure in doing things?:  0  2. Feeling down, depressed, or hopeless?:  0         DEPRESSION ASSESSMENT/PLAN:  Depression screening is negative no further plan needed. Cont to monitor     OB History    Para Term  AB Living   1 1 1 0 0 1   SAB TAB Ectopic Molar Multiple Live Births   0 0 0 0 0 1     GYN History: Denies STIs, last pap 2018    Mammogram: not indicated     Patient Active Problem List   Diagnosis   â¢ Psoriasis   â¢ Blood type, Rh negative   â¢ Family history of open neural tube defect     Past Medical History:   Diagnosis Date   â¢ Psoriasis       Past Surgical History:   Procedure Laterality Date   â¢ Iud insertion  2019    Mirena   â¢ Vaginal delivery     â¢ [place] tooth extraction       Current Outpatient Medications   Medication Sig Dispense Refill   â¢ Prenatal Vit-Fe Fum-FA-Omega (PNV PRENATAL PLUS MULTIVIT+DHA) 27-1 & 312 MG Misc TAKE 1 TABLET AND 1 CAPSULE BY MOUTH DAILY 120 each 2   â¢ clobetasol 0.05 % topical liquid Apply to affected area twice a day for two weeks on, then two weeks off. 59 mL 2   â¢ metoCLOPramide (REGLAN) 10 MG tablet Reglan 10 mg tablets Days 1-7 : take 3 tablets  Day 8-12 : take 2 tablets Days 13 and 14 : take 1 tablet 33 tablet 0   â¢ docusate sodium (COLACE) 100 MG capsule Take 100 mg by mouth.      â¢ Prenatal Vit-Fe Fumarate-FA (MULTIVITAMIN & MINERAL W/FOLIC ACID- PRENATAL) 45-9 MG Tab Take 1 tablet by mouth daily. No current facility-administered medications for this visit. Allergies: ALLERGIES:  No Known Allergies  Family History:   Family History   Problem Relation Age of Onset   â¢ Thyroid Mother    â¢ Patient is unaware of any medical problems Father    â¢ Patient is unaware of any medical problems Brother    â¢ Myocardial Infarction Maternal Grandfather 61   â¢ Dementia/Alzheimers Maternal Grandmother         not diagnosed yet   â¢ Patient is unaware of any medical problems Paternal Grandmother    â¢ Cancer Paternal Grandfather         pancreatic? Breast Cancer: Denies       GYN Cancer: Denies        Colon Cancer: Denies        Osteoporosis: Denies    Social History:   Social History     Socioeconomic History   â¢ Marital status: /Civil Union     Spouse name: Not on file   â¢ Number of children: Not on file   â¢ Years of education: Not on file   â¢ Highest education level: Not on file   Occupational History   â¢ Occupation: Works as  ( [id number])   Social Needs   â¢ Financial resource strain: Not on file   â¢ Food insecurity:     Worry: Not on file     Inability: Not on file   â¢ Transportation needs:     Medical: Not on file     Non-medical: Not on file   Tobacco Use   â¢ Smoking status: Never Smoker   â¢ Smokeless tobacco: Never Used   Substance and Sexual Activity   â¢ Alcohol use: Yes     Frequency: Monthly or less   â¢ Drug use: No   â¢ Sexual activity: Yes     Partners: Male     Birth control/protection: I.U.D.    Lifestyle   â¢ Physical activity:     Days per week: Not on file     Minutes per session: Not on file   â¢ Stress: Not on file   Relationships   â¢ Social connections:     Talks on phone: Not on file     Gets together: Not on file     Attends Catholic service: Not on file     Active member of club or organization: Not on file     Attends meetings of clubs or organizations: Not on file     Relationship status: Not on file   â¢ Intimate partner violence:     Fear of current or ex partner: Not on file     Emotionally abused: Not on file     Physically abused: Not on file     Forced sexual activity: Not on file   Other Topics Concern   â¢ Not on file   Social History Narrative   â¢ Not on file       PREVENTATIVE MEDICINE:  Does patient exercise? Yes  Was counseling given: No  Calcium servings per day 3. Immunizations: none; up to date    OBJECTIVE:  Vitals:    12/06/19 1526   BP: 112/62       Abiola's BMI is Body mass index is 23.69 kg/mÂ²., which is normal     Alert and oriented x 3. General exam: Patient appears well. Neck supple, no adenopathy or masses noted in neck or supraclavicular regions. Thyroid is not enlarged. Chest is clear. Heart sounds are normal without murmur. RRR. Abdomen is normal, soft without masses, organomegaly or tenderness. Skin: no rashes or suspicious skin lesions noted. Breast Exam: breasts symmetric, no dominant or suspicious mass, no skin or nipple changes, no axillary adenopathy and self exam is taught and encouraged. Pelvic Exam:  Genitalia -   External Genitalia: Normal appearance and hair distribution. No lesions noted  Urethral Meatus: Normal size and location, no lesions or prolapse. Urethra: No masses, tenderness or scarring  Bladder: No fullness, masses or tenderness  Uterus: Normal size, contour, position, mobility, and support. No tenderness  Adnexa:  No masses, tenderness, organomegaly or nodularity noted    Pap smear collected: No, reason :  UTD      ASSESSMENT:  Normal gyne exam    PLAN:  Pap smears and HPV testing as according to ACOG guidelines. Mammograms yearly beginning at age 36  Also recommend the following:  Healthy eating habits, Continue to exercise regularly, Adequate intake of dietary calcium, Breast awareness and STD screening with each new partner or potential exposure  Continue with yearly breast and pelvic exams. no

## 2024-05-21 NOTE — ED ADULT TRIAGE NOTE - CHIEF COMPLAINT QUOTE
Pt sent in by Dr. Brennan. PT had reconstructive sx done to b/l breast x 8 days ago and having b/l breast swelling.

## 2024-05-21 NOTE — H&P ADULT - NSHPPHYSICALEXAM_GEN_ALL_CORE
General: NAD, AAOx3, resting comfortably in bed  Lungs: Unlabored breathing  CVS: RRR  Chest: bilateral breast warm, no ecchymosis noted, mild erythema to lateral L breast, mild swelling L>R, tender to palpation laterally and superiorly bilaterally, no active drainage noted, DANY flap viable pink and warm, surgical incisions c/d/i without signs of infection  Abdomen: soft, ND, minimally ttp surrounding incisions, incisions c/d/i  Ext: bilateral LE w/o edema, calf soft NT  Neuro: AAOx3, no sensory/motor deficits

## 2024-05-22 ENCOUNTER — APPOINTMENT (OUTPATIENT)
Dept: PLASTIC SURGERY | Facility: CLINIC | Age: 54
End: 2024-05-22

## 2024-05-22 ENCOUNTER — APPOINTMENT (OUTPATIENT)
Dept: PLASTIC SURGERY | Facility: HOSPITAL | Age: 54
End: 2024-05-22
Payer: COMMERCIAL

## 2024-05-22 ENCOUNTER — TRANSCRIPTION ENCOUNTER (OUTPATIENT)
Age: 54
End: 2024-05-22

## 2024-05-22 PROCEDURE — ZZZZZ: CPT

## 2024-05-22 PROCEDURE — 35820 EXPLORE CHEST VESSELS: CPT | Mod: 78

## 2024-05-22 RX ORDER — ONDANSETRON 8 MG/1
4 TABLET, FILM COATED ORAL ONCE
Refills: 0 | Status: DISCONTINUED | OUTPATIENT
Start: 2024-05-22 | End: 2024-05-22

## 2024-05-22 RX ORDER — OXYCODONE HYDROCHLORIDE 5 MG/1
10 TABLET ORAL EVERY 4 HOURS
Refills: 0 | Status: DISCONTINUED | OUTPATIENT
Start: 2024-05-22 | End: 2024-05-23

## 2024-05-22 RX ORDER — ONDANSETRON 8 MG/1
4 TABLET, FILM COATED ORAL ONCE
Refills: 0 | Status: DISCONTINUED | OUTPATIENT
Start: 2024-05-22 | End: 2024-05-23

## 2024-05-22 RX ORDER — SODIUM CHLORIDE 9 MG/ML
1000 INJECTION, SOLUTION INTRAVENOUS
Refills: 0 | Status: DISCONTINUED | OUTPATIENT
Start: 2024-05-22 | End: 2024-05-23

## 2024-05-22 RX ORDER — SODIUM CHLORIDE 9 MG/ML
1000 INJECTION, SOLUTION INTRAVENOUS
Refills: 0 | Status: DISCONTINUED | OUTPATIENT
Start: 2024-05-22 | End: 2024-05-22

## 2024-05-22 RX ORDER — LEVOTHYROXINE SODIUM 125 MCG
88 TABLET ORAL DAILY
Refills: 0 | Status: DISCONTINUED | OUTPATIENT
Start: 2024-05-22 | End: 2024-05-23

## 2024-05-22 RX ORDER — HYDROMORPHONE HYDROCHLORIDE 2 MG/ML
0.5 INJECTION INTRAMUSCULAR; INTRAVENOUS; SUBCUTANEOUS
Refills: 0 | Status: DISCONTINUED | OUTPATIENT
Start: 2024-05-22 | End: 2024-05-22

## 2024-05-22 RX ORDER — OXYCODONE HYDROCHLORIDE 5 MG/1
5 TABLET ORAL EVERY 4 HOURS
Refills: 0 | Status: DISCONTINUED | OUTPATIENT
Start: 2024-05-22 | End: 2024-05-23

## 2024-05-22 RX ORDER — ASPIRIN/CALCIUM CARB/MAGNESIUM 324 MG
81 TABLET ORAL DAILY
Refills: 0 | Status: DISCONTINUED | OUTPATIENT
Start: 2024-05-22 | End: 2024-05-23

## 2024-05-22 RX ORDER — PIPERACILLIN AND TAZOBACTAM 4; .5 G/20ML; G/20ML
3.38 INJECTION, POWDER, LYOPHILIZED, FOR SOLUTION INTRAVENOUS EVERY 8 HOURS
Refills: 0 | Status: DISCONTINUED | OUTPATIENT
Start: 2024-05-22 | End: 2024-05-23

## 2024-05-22 RX ORDER — SODIUM ZIRCONIUM CYCLOSILICATE 10 G/10G
10 POWDER, FOR SUSPENSION ORAL ONCE
Refills: 0 | Status: DISCONTINUED | OUTPATIENT
Start: 2024-05-22 | End: 2024-05-22

## 2024-05-22 RX ORDER — ACETAMINOPHEN 500 MG
975 TABLET ORAL EVERY 6 HOURS
Refills: 0 | Status: DISCONTINUED | OUTPATIENT
Start: 2024-05-22 | End: 2024-05-23

## 2024-05-22 RX ORDER — HYDROMORPHONE HYDROCHLORIDE 2 MG/ML
1 INJECTION INTRAMUSCULAR; INTRAVENOUS; SUBCUTANEOUS ONCE
Refills: 0 | Status: DISCONTINUED | OUTPATIENT
Start: 2024-05-22 | End: 2024-05-22

## 2024-05-22 RX ADMIN — Medication 300 MILLIGRAM(S): at 05:56

## 2024-05-22 RX ADMIN — OXYCODONE HYDROCHLORIDE 5 MILLIGRAM(S): 5 TABLET ORAL at 01:09

## 2024-05-22 RX ADMIN — PIPERACILLIN AND TAZOBACTAM 200 GRAM(S): 4; .5 INJECTION, POWDER, LYOPHILIZED, FOR SOLUTION INTRAVENOUS at 01:49

## 2024-05-22 RX ADMIN — SODIUM CHLORIDE 75 MILLILITER(S): 9 INJECTION, SOLUTION INTRAVENOUS at 18:09

## 2024-05-22 RX ADMIN — PIPERACILLIN AND TAZOBACTAM 25 GRAM(S): 4; .5 INJECTION, POWDER, LYOPHILIZED, FOR SOLUTION INTRAVENOUS at 21:29

## 2024-05-22 RX ADMIN — PIPERACILLIN AND TAZOBACTAM 25 GRAM(S): 4; .5 INJECTION, POWDER, LYOPHILIZED, FOR SOLUTION INTRAVENOUS at 14:30

## 2024-05-22 RX ADMIN — SODIUM CHLORIDE 100 MILLILITER(S): 9 INJECTION, SOLUTION INTRAVENOUS at 00:09

## 2024-05-22 RX ADMIN — OXYCODONE HYDROCHLORIDE 5 MILLIGRAM(S): 5 TABLET ORAL at 00:09

## 2024-05-22 RX ADMIN — Medication 975 MILLIGRAM(S): at 17:10

## 2024-05-22 RX ADMIN — Medication 81 MILLIGRAM(S): at 17:10

## 2024-05-22 NOTE — DISCHARGE NOTE PROVIDER - NSDCACTIVITY_GEN_ALL_CORE
Stairs allowed/Walking - Indoors allowed/No heavy lifting/straining/Walking - Outdoors allowed/Follow Instructions Provided by your Surgical Team Do not drive or operate machinery/Do not make important decisions/Stairs allowed/Walking - Indoors allowed/No heavy lifting/straining/Walking - Outdoors allowed/Follow Instructions Provided by your Surgical Team/Activity as tolerated

## 2024-05-22 NOTE — BRIEF OPERATIVE NOTE - NSICDXBRIEFPROCEDURE_GEN_ALL_CORE_FT
PROCEDURES:  Revision of reconstruction of both breasts 22-May-2024 10:02:06 and culture left breast Meli Solitario

## 2024-05-22 NOTE — DISCHARGE NOTE PROVIDER - NSDCCPTREATMENT_GEN_ALL_CORE_FT
PRINCIPAL PROCEDURE  Procedure: Revision, reconstruction, breast, bilateral  Findings and Treatment: I and D Left breast and left breast cultured

## 2024-05-22 NOTE — BRIEF OPERATIVE NOTE - NSICDXBRIEFPOSTOP_GEN_ALL_CORE_FT
25-Mar-2021 08:28 POST-OP DIAGNOSIS:  Acquired absence of breast, bilateral 22-May-2024 10:01:45  Meli Solitario

## 2024-05-22 NOTE — DISCHARGE NOTE PROVIDER - CARE PROVIDER_API CALL
Guveara Brennan  Plastic Surgery  [address] [place] [address], [facility] [address]  [place] [address]  Phone: [phone]  Fax: [phone]  Follow Up Time:

## 2024-05-22 NOTE — DISCHARGE NOTE PROVIDER - NSDCFUSCHEDAPPT_GEN_ALL_CORE_FT
Guevara Brennan  [facility] PreAdmits  Scheduled Appointment: 05/22/2024    Palleschi, Susan M [place] Physician [facility]  [address]ASTIC[address]  Scheduled Appointment: 05/28/2024     Palleschi, Susan M  [facility]  PLASTICSUR [address] [place] [address]  Scheduled Appointment: 05/28/2024    Francine Anderson  [facility]  [address]ASTIC[address]  Scheduled Appointment: 05/29/2024    Francine Anderson  [facility]  PLASTICR [address] [place] [address]  Scheduled Appointment: 06/03/2024    Guevara Brennan  [facility]  PLASTICR [address] [place] [address]  Scheduled Appointment: 06/10/2024

## 2024-05-22 NOTE — CONSULT NOTE ADULT - SUBJECTIVE AND OBJECTIVE BOX
HPI:   Patient is a 54y female with  breast cancer, underwent bilateral mastectomy and sharon flaps about a week ago. She had a lot of fluid in the breasts and they remained swollen after the surgery. SHe was noted to have some cloudy fluid from the left breast so underwent aspirate yesterday and the fluid looked cloudy. Today she underwent bilateral breast washouts and cultures pend. She was placed on vanco and zosyn. she has not been feeling ill. The abdomen wounds are doing fine, right drain is removed, left is still in placed. She now has new bilateral breast drains.       REVIEW OF SYSTEMS:  All other review of systems negative (Comprehensive ROS)    PAST MEDICAL & SURGICAL HISTORY:  Hypothyroidism      Fibroids      Cold thyroid nodule      Obese      Chronic back pain      Polyp of corpus uteri      Bilateral chronic knee pain      HTN (hypertension)      Intraductal carcinoma in situ of left breast      H/O myomectomy  2006, 2012      History of subtotal thyroidectomy  2005      H/O arthroscopy of shoulder  right, 2012      S/P D&C (status post dilation and curettage)  with polypectomy, 05/21/21      H/O bilateral mastectomy      H/O breast reconstruction          Allergies    No Known Allergies    Intolerances        Antimicrobials Day #  :1  piperacillin/tazobactam IVPB.. 3.375 Gram(s) IV Intermittent every 8 hours    Other Medications:  acetaminophen     Tablet .. 975 milliGRAM(s) Oral every 6 hours  aspirin enteric coated 81 milliGRAM(s) Oral daily  lactated ringers. 1000 milliLiter(s) IV Continuous <Continuous>  levothyroxine 88 MICROGram(s) Oral daily  ondansetron Injectable 4 milliGRAM(s) IV Push once  oxyCODONE    IR 10 milliGRAM(s) Oral every 4 hours PRN  oxyCODONE    IR 5 milliGRAM(s) Oral every 4 hours PRN      FAMILY HISTORY:  Family history of hypertension (Father)    Family history of congestive heart failure (Father)    Family history of diabetic complications (Sibling, Sibling)        SOCIAL HISTORY:  Smoking: [ ]Yes [ ]No  ETOH: [ ]Yes [ ]No  Drug Use: [ ]Yes [ ]No   [ ] Single[ ]    T(F): 97.9 (05-22-24 @ 10:51), Max: 98.6 (05-22-24 @ 05:29)  HR: 80 (05-22-24 @ 10:51)  BP: 113/75 (05-22-24 @ 10:51)  RR: 15 (05-22-24 @ 10:51)  SpO2: 96% (05-22-24 @ 10:51)  Wt(kg): --    PHYSICAL EXAM:  General: alert, no acute distress  Eyes:  anicteric, no conjunctival injection, no discharge  Oropharynx: no lesions or injection 	  Neck: supple, without adenopathy  Lungs: clear to auscultation  Heart: regular rate and rhythm; no murmur, rubs or gallops  Abdomen: soft, nondistended, nontender, without mass or organomegaly. wound of lower abdomen is clean and intact. breasts are dressed  Skin: no lesions  Extremities: no clubbing, cyanosis, or edema  Neurologic: alert, oriented, moves all extremities    LAB RESULTS:                        11.8   5.84  )-----------( 285      ( 21 May 2024 17:15 )             36.7     05-21    141  |  106  |  16  ----------------------------<  108<H>  3.7   |  30  |  1.10    Ca    8.5      21 May 2024 20:20    TPro  6.7  /  Alb  2.5<L>  /  TBili  0.3  /  DBili  x   /  AST  50<H>  /  ALT  21  /  AlkPhos  59  05-21    LIVER FUNCTIONS - ( 21 May 2024 17:15 )  Alb: 2.5 g/dL / Pro: 6.7 g/dL / ALK PHOS: 59 U/L / ALT: 21 U/L / AST: 50 U/L / GGT: x           Urinalysis Basic - ( 21 May 2024 20:20 )    Color: x / Appearance: x / SG: x / pH: x  Gluc: 108 mg/dL / Ketone: x  / Bili: x / Urobili: x   Blood: x / Protein: x / Nitrite: x   Leuk Esterase: x / RBC: x / WBC x   Sq Epi: x / Non Sq Epi: x / Bacteria: x        MICROBIOLOGY:  RECENT CULTURES:        RADIOLOGY REVIEWED:  < from: US Guided Cyst Aspiration Breast, Left (05.21.24 @ 12:50) >    EXAM: [id number] - US ASP BRST CYST LT#  - ORDERED BY: OSMAN ZAMORA      PROCEDURE DATE:  05/21/2024           INTERPRETATION:  CLINICAL HISTORY: 54-year-old patient presents for   ultrasound-guided aspiration of a fluid collection in the left upper   outer reconstructed breast 1:00 axis. Status post recent bilateral   mastectomy and SHARON flap reconstruction.    Pertinent imaging studies and/or documents were reviewed prior to the   procedure. The patient identification and procedure were verified.The   patient's known allergies and medications were reviewed.  Benefits,   risks, and alternatives to the procedure were explained to the patient   and informed written consent was obtained.    Procedure: A limited sonogram of the left reconstructedbreast was   performed today to localize the previously described finding.    A timeout was performed. After sterile skin preparation, local anesthesia   was achieved with one percent lidocaine. Under ultrasound guidance, in a   lateral approach, an 18-gauge needle was introduced into the fluid   collection in the 1:00 axis which was then aspirated to resolution.    Approximately 50 mL of bloody/serosanguineous fluid was aspirated.    The fluid was sent for cytology and for Gram stain and culture.    Postprocedure mammography was not performed    The patient tolerated the procedure well, without immediate complication   or measurable blood loss.    IMPRESSION:  Cyst aspiration left breast.  Further management will be based on cytology results.    --- End of Report ---    < end of copied text >          Impression:  Patient is a 54y female with  breast cancer, underwent bilateral mastectomy and sharon flaps about a week ago. She had a lot of fluid in the breasts and they remained swollen after the surgery. SHe was noted to have some cloudy fluid from the left breast so underwent aspirate yesterday and the fluid looked cloudy. Today she underwent bilateral breast washouts and cultures pend. She was placed on  zosyn. She has not been feeling ill. The abdomen wounds are doing fine, right drain is removed, left is still in place. She now has new bilateral breast drains. The fluid cultures are not ready yet . Will do empiric zosyn pending further data    Recommendations:  continue zosyn for now  f/u cultures from seroma

## 2024-05-22 NOTE — DISCHARGE NOTE PROVIDER - NSDCMRMEDTOKEN_GEN_ALL_CORE_FT
bacitracin 500 units/g topical ointment: 1 Apply topically to affected area 3 times a day  levothyroxine 88 mcg (0.088 mg) oral capsule: 1 cap(s) orally once a day  losartan 25 mg oral tablet: 1 tab(s) orally once a day  olmesartan 5 mg oral tablet: 2 tab(s) orally once a day   bacitracin 500 units/g topical ointment: 1 Apply topically to affected area 3 times a day  cefadroxil 1000 mg oral tablet: 1 tab(s) orally every 12 hours  levothyroxine 88 mcg (0.088 mg) oral capsule: 1 cap(s) orally once a day  losartan 25 mg oral tablet: 1 tab(s) orally once a day  olmesartan 5 mg oral tablet: 2 tab(s) orally once a day

## 2024-05-22 NOTE — DISCHARGE NOTE PROVIDER - HOSPITAL COURSE
53yo F w/ pmh SAMANTHA, HTN, Hypothyroid, hx breast ca with recent surgical hx of bilateral mastectomy and bilateral DANY reconstruction 5/13/24 w/ Dr. Brennan now presented to ED with bilateral breast pain accompanied by swelling and redness. Patient had drains removed end of last week in office with Dr. Brennan, and then Monday pain and swelling noted. Pt was sent today for seroma aspiration by radiology and serosanguinous fluid was drained, then sent into ED by surgeon for further evaluation. Pt admit to significant pain 7/10 to bilateral breast, L>R. Reports regular post-operative pain to abdomen. No documented fevers.  Pt denies F/C/N/V, cp, sob, dizziness (21 May 2024 18:05)    The patient was admitted and started on antibiotics .    Patient was taken to the OR on 5/22 and underwent Bilateral revision reconstruction bilateral breasts, Evacuation of sero - sanguanous seroma of left breast and cultured .   New # 19 rosalind drains placed left and right breast.    Patient admitted for IV antibiotics and pain management .   She will be seen by ID Dr Benz and we will monitor her progress and patient to be sent home tomorrow 5/23.     55yo F w/ pmh SAMANTHA, HTN, Hypothyroid, hx breast ca with recent surgical hx of bilateral mastectomy and bilateral DANY reconstruction 5/13/24 w/ Dr. Brennan now presented to ED with bilateral breast pain accompanied by swelling and redness. Patient had drains removed end of last week in office with Dr. Brennan, and then Monday pain and swelling noted. Pt was sent today for seroma aspiration by radiology and serosanguinous fluid was drained, then sent into ED by surgeon for further evaluation. Pt admit to significant pain 7/10 to bilateral breast, L>R. Reports regular post-operative pain to abdomen. No documented fevers.  Pt denies F/C/N/V, cp, sob, dizziness (21 May 2024 18:05)    The patient was admitted and started on antibiotics .    Patient was taken to the OR on 5/22 and underwent Bilateral revision reconstruction bilateral breasts, Evacuation of sero - sanguinous seroma of left breast and cultured .   New # 19 rosalind drains placed left and right breast.    Patient admitted for IV antibiotics and pain management .   She will be seen by ID Dr Benz and we will monitor her progress and patient to be sent home tomorrow 5/23.    Pt doing well, cleared by ID for discharge on po Cefadroxil 1000mg q 12 hours for 1 week

## 2024-05-22 NOTE — DISCHARGE NOTE PROVIDER - NSDCFUADDINST_GEN_ALL_CORE_FT
TAKE ANTIBIOTIC UNTIL FINISHED    EMPTY SURGICAL DRAINS TWICE A DAY, RECORD THE AMOUNT AND COLOR    TAKE TYLENOL OR IBUPROFEN FOR PAIN AS NEEDED TAKE ANTIBIOTIC UNTIL FINISHED    EMPTY SURGICAL DRAINS TWICE A DAY, RECORD THE AMOUNT AND COLOR    YOU MAY SHOWER TOMORROW     TAKE TYLENOL OR IBUPROFEN FOR PAIN AS NEEDED

## 2024-05-23 ENCOUNTER — TRANSCRIPTION ENCOUNTER (OUTPATIENT)
Age: 54
End: 2024-05-23

## 2024-05-23 VITALS
HEART RATE: 70 BPM | SYSTOLIC BLOOD PRESSURE: 139 MMHG | TEMPERATURE: 99 F | DIASTOLIC BLOOD PRESSURE: 80 MMHG | OXYGEN SATURATION: 100 % | RESPIRATION RATE: 19 BRPM

## 2024-05-23 LAB
ANION GAP SERPL CALC-SCNC: 7 MMOL/L — SIGNIFICANT CHANGE UP (ref 5–17)
BUN SERPL-MCNC: 16 MG/DL — SIGNIFICANT CHANGE UP (ref 7–23)
CALCIUM SERPL-MCNC: 8.7 MG/DL — SIGNIFICANT CHANGE UP (ref 8.4–10.5)
CHLORIDE SERPL-SCNC: 105 MMOL/L — SIGNIFICANT CHANGE UP (ref 96–108)
CO2 SERPL-SCNC: 31 MMOL/L — SIGNIFICANT CHANGE UP (ref 22–31)
CREAT SERPL-MCNC: 0.9 MG/DL — SIGNIFICANT CHANGE UP (ref 0.5–1.3)
EGFR: 75 ML/MIN/1.73M2 — SIGNIFICANT CHANGE UP
GLUCOSE SERPL-MCNC: 108 MG/DL — HIGH (ref 70–99)
HCT VFR BLD CALC: 34.4 % — LOW (ref 34.5–45)
HGB BLD-MCNC: 11.4 G/DL — LOW (ref 11.5–15.5)
MCHC RBC-ENTMCNC: 27.3 PG — SIGNIFICANT CHANGE UP (ref 27–34)
MCHC RBC-ENTMCNC: 33.1 GM/DL — SIGNIFICANT CHANGE UP (ref 32–36)
MCV RBC AUTO: 82.3 FL — SIGNIFICANT CHANGE UP (ref 80–100)
NRBC # BLD: 0 /100 WBCS — SIGNIFICANT CHANGE UP (ref 0–0)
PLATELET # BLD AUTO: 346 K/UL — SIGNIFICANT CHANGE UP (ref 150–400)
POTASSIUM SERPL-MCNC: 3.5 MMOL/L — SIGNIFICANT CHANGE UP (ref 3.5–5.3)
POTASSIUM SERPL-SCNC: 3.5 MMOL/L — SIGNIFICANT CHANGE UP (ref 3.5–5.3)
RBC # BLD: 4.18 M/UL — SIGNIFICANT CHANGE UP (ref 3.8–5.2)
RBC # FLD: 15.3 % — HIGH (ref 10.3–14.5)
SODIUM SERPL-SCNC: 143 MMOL/L — SIGNIFICANT CHANGE UP (ref 135–145)
WBC # BLD: 8.95 K/UL — SIGNIFICANT CHANGE UP (ref 3.8–10.5)
WBC # FLD AUTO: 8.95 K/UL — SIGNIFICANT CHANGE UP (ref 3.8–10.5)

## 2024-05-23 PROCEDURE — 99285 EMERGENCY DEPT VISIT HI MDM: CPT

## 2024-05-23 PROCEDURE — 86850 RBC ANTIBODY SCREEN: CPT

## 2024-05-23 PROCEDURE — 80053 COMPREHEN METABOLIC PANEL: CPT

## 2024-05-23 PROCEDURE — 96368 THER/DIAG CONCURRENT INF: CPT

## 2024-05-23 PROCEDURE — 87070 CULTURE OTHR SPECIMN AEROBIC: CPT

## 2024-05-23 PROCEDURE — 85610 PROTHROMBIN TIME: CPT

## 2024-05-23 PROCEDURE — 87075 CULTR BACTERIA EXCEPT BLOOD: CPT

## 2024-05-23 PROCEDURE — 83605 ASSAY OF LACTIC ACID: CPT

## 2024-05-23 PROCEDURE — 85027 COMPLETE CBC AUTOMATED: CPT

## 2024-05-23 PROCEDURE — 36415 COLL VENOUS BLD VENIPUNCTURE: CPT

## 2024-05-23 PROCEDURE — 96365 THER/PROPH/DIAG IV INF INIT: CPT

## 2024-05-23 PROCEDURE — 93005 ELECTROCARDIOGRAM TRACING: CPT

## 2024-05-23 PROCEDURE — 80048 BASIC METABOLIC PNL TOTAL CA: CPT

## 2024-05-23 PROCEDURE — 86901 BLOOD TYPING SEROLOGIC RH(D): CPT

## 2024-05-23 PROCEDURE — 85025 COMPLETE CBC W/AUTO DIFF WBC: CPT

## 2024-05-23 PROCEDURE — 87040 BLOOD CULTURE FOR BACTERIA: CPT

## 2024-05-23 PROCEDURE — 85730 THROMBOPLASTIN TIME PARTIAL: CPT

## 2024-05-23 PROCEDURE — 86900 BLOOD TYPING SEROLOGIC ABO: CPT

## 2024-05-23 RX ADMIN — Medication 975 MILLIGRAM(S): at 12:05

## 2024-05-23 RX ADMIN — Medication 81 MILLIGRAM(S): at 12:05

## 2024-05-23 RX ADMIN — PIPERACILLIN AND TAZOBACTAM 25 GRAM(S): 4; .5 INJECTION, POWDER, LYOPHILIZED, FOR SOLUTION INTRAVENOUS at 06:07

## 2024-05-23 RX ADMIN — OXYCODONE HYDROCHLORIDE 5 MILLIGRAM(S): 5 TABLET ORAL at 05:07

## 2024-05-23 RX ADMIN — Medication 88 MICROGRAM(S): at 05:07

## 2024-05-23 RX ADMIN — Medication 975 MILLIGRAM(S): at 12:37

## 2024-05-23 NOTE — DISCHARGE NOTE NURSING/CASE MANAGEMENT/SOCIAL WORK - NSDCFUADDAPPT_GEN_ALL_CORE_FT
follow up with Dr. Brennan next week, please call the office for an appointment  follow up with Dr. Brennan 5/29/24 as directed

## 2024-05-23 NOTE — PROGRESS NOTE ADULT - SUBJECTIVE AND OBJECTIVE BOX
Patient doing well  She feels better since procedure. She denies any fevers or chills.    On exam, both breasts are soft. There is no erythema. There is no tenderness to palpation.   There is serous drain output.  Incisions are intact.  Abdominal exam is unremarkable.
Initial Eval Note     54y Female admitted POD #0 from Revision of reconstruction of both breasts        Vital Signs Last 24 Hrs  T(C): 37.2 (22 May 2024 19:30), Max: 37.2 (22 May 2024 19:30)  T(F): 99 (22 May 2024 19:30), Max: 99 (22 May 2024 19:30)  HR: 71 (22 May 2024 19:30) (71 - 81)  BP: 131/77 (22 May 2024 19:30) (100/73 - 131/77)  BP(mean): --  RR: 16 (22 May 2024 19:30) (11 - 18)  SpO2: 98% (22 May 2024 19:30) (96% - 100%)    Parameters below as of 22 May 2024 19:30  Patient On (Oxygen Delivery Method): room air                                11.8   5.84  )-----------( 285      ( 21 May 2024 17:15 )             36.7         05-21    141  |  106  |  16  ----------------------------<  108<H>  3.7   |  30  |  1.10    Ca    8.5      21 May 2024 20:20    TPro  6.7  /  Alb  2.5<L>  /  TBili  0.3  /  DBili  x   /  AST  50<H>  /  ALT  21  /  AlkPhos  59  05-21        NEURO: no headaches, blurry vision, tremors, depression, anxity  CV: no chest pain, palpitations, murmurs, orthopnea  Resp: no shortness of breath, cough, wheeze, sputum production  GI: no stomach pain,nausea, vomitting, flatulence, hematemesis, hematochezia  PV: no swelling of extremities, no hair loss, no coolness to extremities  ENDO: no polydypsia, polyphagia, polyuria, weight loss, night sweats          NEURO: awake and alert  CV: (+) S1/S2, rrr, no mrg  RESP: CTA b/l  GI: soft, non tender            
CC: f/u for post op seromas    Patient reports: no complaints, she is feeling well    REVIEW OF SYSTEMS:  All other review of systems negative (Comprehensive ROS)    Antimicrobials Day #  :day 2  piperacillin/tazobactam IVPB.. 3.375 Gram(s) IV Intermittent every 8 hours    Other Medications Reviewed    T(F): 98.2 (05-23-24 @ 05:01), Max: 99 (05-22-24 @ 19:30)  HR: 75 (05-23-24 @ 05:01)  BP: 113/72 (05-23-24 @ 05:01)  RR: 18 (05-23-24 @ 05:01)  SpO2: 98% (05-23-24 @ 05:01)  Wt(kg): --    PHYSICAL EXAM:  General: alert, no acute distress  Eyes:  anicteric, no conjunctival injection, no discharge  Oropharynx: no lesions or injection 	  Neck: supple, without adenopathy  Lungs: clear to auscultation  Heart: regular rate and rhythm; no murmur, rubs or gallops  Abdomen: soft, nondistended, nontender, without mass or organomegaly  Skin: breast incisions intact, drains with serous drainage  Extremities: no clubbing, cyanosis, or edema  Neurologic: alert, oriented, moves all extremities    LAB RESULTS:                        11.4   8.95  )-----------( 346      ( 23 May 2024 06:37 )             34.4     05-23    143  |  105  |  16  ----------------------------<  108<H>  3.5   |  31  |  0.90    Ca    8.7      23 May 2024 06:37    TPro  6.7  /  Alb  2.5<L>  /  TBili  0.3  /  DBili  x   /  AST  50<H>  /  ALT  21  /  AlkPhos  59  05-21    LIVER FUNCTIONS - ( 21 May 2024 17:15 )  Alb: 2.5 g/dL / Pro: 6.7 g/dL / ALK PHOS: 59 U/L / ALT: 21 U/L / AST: 50 U/L / GGT: x           Urinalysis Basic - ( 23 May 2024 06:37 )    Color: x / Appearance: x / SG: x / pH: x  Gluc: 108 mg/dL / Ketone: x  / Bili: x / Urobili: x   Blood: x / Protein: x / Nitrite: x   Leuk Esterase: x / RBC: x / WBC x   Sq Epi: x / Non Sq Epi: x / Bacteria: x      MICROBIOLOGY:  RECENT CULTURES:  05-22 @ 09:14 .Surgical Swab Right Breast Culture, Graim Stain, Aerobic & Anaerobic     No growth      05-22 @ 09:09 .Surgical Swab LEFT BREAST CULTURE, GRAM STAIN AEROBIC & ANAEROBIC     No growth      05-21 @ 17:20 .Blood Blood-Peripheral     No growth at 24 hours      05-21 @ 17:15 .Blood Blood-Peripheral     No growth at 24 hours          RADIOLOGY REVIEWED:

## 2024-05-23 NOTE — DISCHARGE NOTE NURSING/CASE MANAGEMENT/SOCIAL WORK - PATIENT PORTAL LINK FT
You can access the FollowMyHealth Patient Portal offered by [facility] by registering at the following website: [email or web address]. By joining [facility]’s FollowMyHealth portal, you will also be able to view your health information using other applications (apps) compatible with our system.

## 2024-05-23 NOTE — PROGRESS NOTE ADULT - ASSESSMENT
53 yo female s/p mastectomy with reconstruction.  S/P washout.  No systemic signs of infection.  The preliminary micro is negative.  No ID objection to discharge planning  Can send out on cefadroxil 500mg- 1 gram po BID  She understands  that she may have regimen altered if needed.  She had taken 3 doses prior to admission.
s/p revision of bilateral breast reconstruction with drain insertion, POD 1  following previous DANY flap breast reconstruction 05.13.24    plan:  d/c home  ok to shower  continue post-surgical mammary garment  office to call patient for postoperative appt week of 5/27 at [address] [place] [address], [address], [place]  please discharge with antibiotics per infectious disease
PLAN:        -serial exams, any changes in exam to be escelated to surgical team immedietly  -pain control  -finish post op anbx  -IVFs until taking adequate PO  -advance diet per surgical team  -dressing changes per surgical team  -DVT prophylaxis  -AM labs           TIME SPENT:  55 minutes of  time spent providing medical care for patient's acute illness/conditions that impairs at least one vital organ system and/or poses a high risk of imminent or life threatening deterioration in the patient's condition. It includes time spent evaluating and treating the patient's acute illness as well as time spent reviewing labs, radiology, discussing goals of care with patient and/or patient's family, and discussing the case with a multidisciplinary team, including the eICU, in an effort to prevent further life threatening deterioration or end organ damage. This time is independent of any procedures performed.    DATE OF ENTRY OF THIS NOTE IS EQUAL TO THE DATE OF SERVICES RENDERED

## 2024-05-23 NOTE — DISCHARGE NOTE NURSING/CASE MANAGEMENT/SOCIAL WORK - NSDCPEFALRISK_GEN_ALL_CORE
For information on Fall & Injury Prevention, visit: [email or web address] OR  [email or web address] OR  https://www.cdc.gov/steadi/patient.html

## 2024-05-24 PROBLEM — I10 BENIGN ESSENTIAL HYPERTENSION: Status: ACTIVE | Noted: 2022-08-03

## 2024-05-24 PROBLEM — Z09 POSTOP CHECK: Status: ACTIVE | Noted: 2021-10-29

## 2024-05-24 LAB — NON-GYNECOLOGICAL CYTOLOGY STUDY: SIGNIFICANT CHANGE UP

## 2024-05-24 RX ORDER — LOSARTAN POTASSIUM 25 MG/1
25 TABLET, FILM COATED ORAL DAILY
Refills: 0 | Status: ACTIVE | COMMUNITY
Start: 2024-05-24

## 2024-05-24 RX ORDER — MELOXICAM 15 MG/1
15 TABLET ORAL
Qty: 30 | Refills: 1 | Status: DISCONTINUED | COMMUNITY
Start: 2022-05-23 | End: 2024-05-24

## 2024-05-24 RX ORDER — OLMESARTAN MEDOXOMIL 5 MG/1
5 TABLET, FILM COATED ORAL DAILY
Refills: 0 | Status: ACTIVE | COMMUNITY
Start: 2024-05-24

## 2024-05-24 RX ORDER — CELECOXIB 200 MG/1
200 CAPSULE ORAL DAILY
Qty: 30 | Refills: 0 | Status: DISCONTINUED | COMMUNITY
Start: 2024-01-16 | End: 2024-05-24

## 2024-05-24 RX ORDER — OLMESARTAN MEDOXOMIL 5 MG/1
5 TABLET, FILM COATED ORAL DAILY
Qty: 90 | Refills: 3 | Status: DISCONTINUED | COMMUNITY
Start: 2022-03-28 | End: 2024-05-24

## 2024-05-24 NOTE — HISTORY OF PRESENT ILLNESS
[Post-hospitalization from ___ Hospital] : Post-hospitalization from [unfilled] Hospital [Admitted on: ___] : The patient was admitted on [unfilled] [Discharged on ___] : discharged on [unfilled] [Discharge Summary] : discharge summary [Discharge Med List] : discharge medication list [Other: ____] : [unfilled] [Med Reconciliation] : medication reconciliation has been completed [Patient Contacted By: ____] : and contacted by [unfilled] [FreeTextEntry2] :  54F with PMHx of SAMANTHA, HTN, hypothyroid presented to [facility] on 5/13 for scheduled procedure. Pt now s/p b/l skin-sparing mastectomies w/ b/l DANY reconstruction with Drs. Palleschi, Tanna, and Ananth. Operative course uneventful, pt recovered in PACU and admitted to surgical floor for further monitoring. Pt cook dopplers, vitals, and labs were monitored throughout stay. Pt was on DVT prophylaxis during hospital admission. POD1 unger was removed and pt advanced to regular diet. Pt OOB/ambulated. POD2 pt was seen and examined by attending surgeon and determined stable for discharge. Cook dopplers were disconnected. At time of discharge pt was HD stable, tolerating diet, voiding freely, exam stable. Pt understands and agreeable to plan. Patient will follow up with Dr Brennan on 5/20.  53 y/o female post op bilateral skin sparing mastectomies with reconstruction seen today for TCM visit. She is accompanied by her family/spiritual friend who has been assisting her with her recovery. She is ambulatory w/o assistance steady gait. She had needle aspiration this AM, has one UNIQUE to abdominal wound. Started on cefadroxil 500mg Q12 x 7 days. Pt doing well, takes her medications as prescribed and keeps her MD appointments. TCM explained and yellow card left in the home.

## 2024-05-24 NOTE — PHYSICAL EXAM
[Well Developed] : well developed [Normal Voice/Communication] : normal voice/communication [Ill-Appearing] : ill-appearing [Cachectic] : cachexia was observed [Normal Sclera/Conjunctiva] : normal sclera/conjunctiva [No JVD] : no jugular venous distention [No Respiratory Distress] : no respiratory distress  [Clear to Auscultation] : lungs were clear to auscultation bilaterally [No Accessory Muscle Use] : no accessory muscle use [Normal Rate] : normal rate  [Regular Rhythm] : with a regular rhythm [Normal S1, S2] : normal S1 and S2 [No Varicosities] : no varicosities [No Edema] : there was no peripheral edema [Normal Gait] : normal gait [Alert and Oriented x3] : oriented to person, place, and time [Normal Mood] : the mood was normal [de-identified] : post op inflammation to both breast,  [de-identified] : suprapubic  healing surgical incision.

## 2024-05-24 NOTE — HEALTH RISK ASSESSMENT
[No] : In the past 12 months have you used drugs other than those required for medical reasons? No [No falls in past year] : Patient reported no falls in the past year [Little interest or pleasure doing things] : 1) Little interest or pleasure doing things [Feeling down, depressed, or hopeless] : 2) Feeling down, depressed, or hopeless [0] : 2) Feeling down, depressed, or hopeless: Not at all (0) [PHQ-2 Negative - No further assessment needed] : PHQ-2 Negative - No further assessment needed [None] : None [With Significant Other] : lives with significant other [College] : College [] :  [Independent] : managing finances [Some assistance needed] : using transportation [Full assistance needed] : doing laundry [Aggressive treatment] : aggressive treatment [I will adhere to the patient's wishes.] : I will adhere to the patient's wishes. 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

## 2024-05-24 NOTE — REVIEW OF SYSTEMS
[Negative] : Heme/Lymph [de-identified] : surgical scars to breast abdomen and suprapubic area. Tender

## 2024-05-26 LAB
CULTURE RESULTS: SIGNIFICANT CHANGE UP
CULTURE RESULTS: SIGNIFICANT CHANGE UP
SPECIMEN SOURCE: SIGNIFICANT CHANGE UP
SPECIMEN SOURCE: SIGNIFICANT CHANGE UP

## 2024-05-28 ENCOUNTER — APPOINTMENT (OUTPATIENT)
Dept: PLASTIC SURGERY | Facility: CLINIC | Age: 54
End: 2024-05-28
Payer: COMMERCIAL

## 2024-05-28 VITALS
TEMPERATURE: 97.6 F | HEART RATE: 90 BPM | BODY MASS INDEX: 32.8 KG/M2 | OXYGEN SATURATION: 98 % | DIASTOLIC BLOOD PRESSURE: 90 MMHG | HEIGHT: 67 IN | SYSTOLIC BLOOD PRESSURE: 144 MMHG | WEIGHT: 209 LBS

## 2024-05-28 DIAGNOSIS — M62.838 OTHER MUSCLE SPASM: ICD-10-CM

## 2024-05-28 DIAGNOSIS — D05.12 INTRADUCTAL CARCINOMA IN SITU OF LEFT BREAST: ICD-10-CM

## 2024-05-28 PROCEDURE — 99024 POSTOP FOLLOW-UP VISIT: CPT

## 2024-05-28 RX ORDER — CYCLOBENZAPRINE HYDROCHLORIDE 10 MG/1
10 TABLET, FILM COATED ORAL 3 TIMES DAILY
Qty: 21 | Refills: 0 | Status: ACTIVE | COMMUNITY
Start: 2024-05-28 | End: 1900-01-01

## 2024-05-28 RX ORDER — OXYCODONE AND ACETAMINOPHEN 5; 325 MG/1; MG/1
5-325 TABLET ORAL
Qty: 15 | Refills: 0 | Status: ACTIVE | COMMUNITY
Start: 2024-05-28 | End: 1900-01-01

## 2024-05-28 NOTE — PHYSICAL EXAM
[Normocephalic] : normocephalic [Atraumatic] : atraumatic [EOMI] : extra ocular movement intact [Sclera nonicteric] : sclera nonicteric [Supple] : supple [Examined in the supine and seated position] : examined in the supine and seated position [No dominant masses] : no dominant masses in right breast  [No dominant masses] : no dominant masses left breast [de-identified] : S/P bilateral skin sparing total mastectomies, bilateral DANY flap reconstruction. Incisions C/D/I, no erythema or warmth, no evidence of infection, no palpable seroma/hematoma.  Bilateral breast and left hip drains are serosanguineous, stripped.

## 2024-05-28 NOTE — ASSESSMENT
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  1. Pathology reviewed with her, copy provided 2. Follow up office visit due 8/2024. 3. Advised monthly self breast examinations and advised her to contact me if she has any concerns. 4. Follow up with Dr. Brennan tomorrow. 5. Medical oncology and radiation oncology consults not clinically indicated, as the surgical path revealed DCIS only with negative margins. 6.  Referral to [facility] made.  She can begin physical therapy once cleared by Dr. Brennan. 7.  The bilateral breast and left hip drains were removed by me today and a dry gauze and tape placed over the exit sites.

## 2024-05-28 NOTE — CONSULT LETTER
[Dear  ___] : Dear  [unfilled], [Courtesy Letter:] : I had the pleasure of seeing your patient, [unfilled], in my office today. [Please see my note below.] : Please see my note below. [Sincerely,] : Sincerely, [DrEric  ___] : Dr. HU [FreeTextEntry3] : Susan M. Palleschi, MD, FACS Division of Breast Surgery Director, Breast Surgery [address] [address] (Phone) [phone] (Fax) [phone]

## 2024-05-28 NOTE — HISTORY OF PRESENT ILLNESS
[FreeTextEntry1] : She is a 54 year old female here for a post-op office visit.  She has a family history of breast cancer in her sister, who was diagnosed at age 55.  She underwent breast conserving therapy.  She states that she has had a recent recurrence and will be undergoing bilateral mastectomies in [place].  She does not think her sister had genetic testing. She has a past medical history of hypothyroid and HTN; she is status post partial thyroidectomy and multiple myomectomies. 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 [facility]2023 [facility]: 44.2%. The breasts are extremely dense, which lowers the sensitivity of mammography. No suspicious mass, suspicious microcalcifications, or other sign of malignancy is identified. Bilateral ultrasound: No suspicious solid masses. BI-RADS 1 2/26/2024 Bilateral high risk screening baseline breast MRI: RIGHT BREAST: There are scattered enhancing nonspecific foci.  There is no suspicious enhancement in the right breast. LEFT BREAST: There is a focal area of clumped nonmass enhancement in the anterior slightly upper inner left breast spanning up to 2.4 cm in CC dimension and best seen on axial image 82, coronal image 282. There are scattered enhancing nonspecific foci. AXILLA/OTHER: There is no significant axillary or internal mammary lymphadenopathy. 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 ER+(>95%)/AK+(5%). These results are concordant, recommend surgical or oncologic management. 3/4/2024 [facility] Common Hereditary Cancers Panel negative 5/13/2024 Bilateral skin sparing total mastectomies, injection of bilateral breasts with Magtrace, bilateral DANY flap reconstruction. Pathology: 1.  Breast, right, total mastectomy -   Focal fibrocystic changes. -   Unremarkable nipple and skin. 2.  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 -   The DCIS measures 3.0 mm in greatest dimension and is present in 1 of 12 slides. -   No invasive carcinoma or lymphovascular invasion identified. -   The resection margins are negative for carcinoma.  DCIS is 13.0 mm from the nearest margin (anterior) and 35.0 mm from the posterior margin. -   Nipple and skin negative for carcinoma. -   Biopsy site changes. 3.  Internal mammary lymph node, right, biopsy -   One lymph node negative for metastatic carcinoma (0/1). 4.  Internal mammary lymph node, left, biopsy -   One lymph node negative for metastatic carcinoma (0/1). Plastics: Dr. Brennan/Dr. Wadsworth. She saw Dr. Brennan 5/20/2024. Breast drains removed. He advised she undergo a left ultrasound guided aspiration of a fluid collection and put her on PO antibiotics. 5/21/2024 Ultrasound-guided aspiration of a fluid collection in the left upper outer reconstructed breast 1:00 axis. 50 mL bloody/serosanguineous fluid was aspirated. The fluid was sent for cytology and for Gram stain and culture: negative. She is here with her niece, Blaine and her friend, Tracy. She has bilateral breast drains and a left abdominal drain.  She states she spoke to Dr. Brennan who told her the drains can be removed by me today.

## 2024-05-29 ENCOUNTER — APPOINTMENT (OUTPATIENT)
Dept: PLASTIC SURGERY | Facility: CLINIC | Age: 54
End: 2024-05-29
Payer: COMMERCIAL

## 2024-05-29 VITALS
OXYGEN SATURATION: 98 % | TEMPERATURE: 97.5 F | HEIGHT: 67 IN | SYSTOLIC BLOOD PRESSURE: 147 MMHG | WEIGHT: 209 LBS | DIASTOLIC BLOOD PRESSURE: 94 MMHG | RESPIRATION RATE: 16 BRPM | BODY MASS INDEX: 32.8 KG/M2 | HEART RATE: 75 BPM

## 2024-05-29 PROCEDURE — 99024 POSTOP FOLLOW-UP VISIT: CPT

## 2024-05-30 ENCOUNTER — TRANSCRIPTION ENCOUNTER (OUTPATIENT)
Age: 54
End: 2024-05-30

## 2024-06-03 ENCOUNTER — APPOINTMENT (OUTPATIENT)
Dept: PLASTIC SURGERY | Facility: CLINIC | Age: 54
End: 2024-06-03
Payer: COMMERCIAL

## 2024-06-03 VITALS
HEART RATE: 88 BPM | DIASTOLIC BLOOD PRESSURE: 92 MMHG | BODY MASS INDEX: 33.74 KG/M2 | SYSTOLIC BLOOD PRESSURE: 149 MMHG | OXYGEN SATURATION: 98 % | TEMPERATURE: 98.1 F | RESPIRATION RATE: 16 BRPM | WEIGHT: 215 LBS | HEIGHT: 67 IN

## 2024-06-03 PROCEDURE — 99024 POSTOP FOLLOW-UP VISIT: CPT

## 2024-06-03 NOTE — REASON FOR VISIT
[Follow-Up: _____] : a [unfilled] follow-up visit [FreeTextEntry1] : post op visit, s/p bilateral breast reconstruction with DANY flaps on 5/13/24.

## 2024-06-05 ENCOUNTER — APPOINTMENT (OUTPATIENT)
Dept: PLASTIC SURGERY | Facility: CLINIC | Age: 54
End: 2024-06-05
Payer: COMMERCIAL

## 2024-06-05 VITALS
HEART RATE: 84 BPM | WEIGHT: 215 LBS | BODY MASS INDEX: 33.74 KG/M2 | SYSTOLIC BLOOD PRESSURE: 145 MMHG | HEIGHT: 67 IN | OXYGEN SATURATION: 98 % | DIASTOLIC BLOOD PRESSURE: 88 MMHG | TEMPERATURE: 98.1 F

## 2024-06-05 PROCEDURE — 99024 POSTOP FOLLOW-UP VISIT: CPT

## 2024-06-06 ENCOUNTER — RESULT REVIEW (OUTPATIENT)
Age: 54
End: 2024-06-06

## 2024-06-06 ENCOUNTER — APPOINTMENT (OUTPATIENT)
Dept: ULTRASOUND IMAGING | Facility: CLINIC | Age: 54
End: 2024-06-06

## 2024-06-06 ENCOUNTER — OUTPATIENT (OUTPATIENT)
Dept: OUTPATIENT SERVICES | Facility: HOSPITAL | Age: 54
LOS: 1 days | End: 2024-06-06
Payer: COMMERCIAL

## 2024-06-06 ENCOUNTER — APPOINTMENT (OUTPATIENT)
Dept: ULTRASOUND IMAGING | Facility: IMAGING CENTER | Age: 54
End: 2024-06-06
Payer: COMMERCIAL

## 2024-06-06 DIAGNOSIS — Z90.13 ACQUIRED ABSENCE OF BILATERAL BREASTS AND NIPPLES: Chronic | ICD-10-CM

## 2024-06-06 DIAGNOSIS — Z98.890 OTHER SPECIFIED POSTPROCEDURAL STATES: Chronic | ICD-10-CM

## 2024-06-06 DIAGNOSIS — Z00.8 ENCOUNTER FOR OTHER GENERAL EXAMINATION: ICD-10-CM

## 2024-06-06 DIAGNOSIS — Z90.13 ACQUIRED ABSENCE OF BILATERAL BREASTS AND NIPPLES: ICD-10-CM

## 2024-06-06 PROCEDURE — 76641 ULTRASOUND BREAST COMPLETE: CPT

## 2024-06-06 PROCEDURE — 88305 TISSUE EXAM BY PATHOLOGIST: CPT | Mod: 26

## 2024-06-06 PROCEDURE — 76942 ECHO GUIDE FOR BIOPSY: CPT

## 2024-06-06 PROCEDURE — 76641 ULTRASOUND BREAST COMPLETE: CPT | Mod: 26,50

## 2024-06-06 PROCEDURE — 19000 PUNCTURE ASPIR CYST BREAST: CPT | Mod: RT

## 2024-06-06 PROCEDURE — 88305 TISSUE EXAM BY PATHOLOGIST: CPT

## 2024-06-06 PROCEDURE — 19001 PUNCTURE ASPIR CYST BRST EA: CPT | Mod: LT

## 2024-06-06 PROCEDURE — 19000 PUNCTURE ASPIR CYST BREAST: CPT

## 2024-06-06 PROCEDURE — 88173 CYTOPATH EVAL FNA REPORT: CPT | Mod: 26

## 2024-06-06 PROCEDURE — 19001 PUNCTURE ASPIR CYST BRST EA: CPT

## 2024-06-06 PROCEDURE — 88173 CYTOPATH EVAL FNA REPORT: CPT

## 2024-06-06 PROCEDURE — 76942 ECHO GUIDE FOR BIOPSY: CPT | Mod: 26,59

## 2024-06-06 PROCEDURE — 76942 ECHO GUIDE FOR BIOPSY: CPT | Mod: 26,59,76

## 2024-06-07 ENCOUNTER — TRANSCRIPTION ENCOUNTER (OUTPATIENT)
Age: 54
End: 2024-06-07

## 2024-06-10 ENCOUNTER — APPOINTMENT (OUTPATIENT)
Dept: PLASTIC SURGERY | Facility: CLINIC | Age: 54
End: 2024-06-10
Payer: COMMERCIAL

## 2024-06-10 VITALS
HEIGHT: 67 IN | SYSTOLIC BLOOD PRESSURE: 126 MMHG | HEART RATE: 78 BPM | DIASTOLIC BLOOD PRESSURE: 82 MMHG | BODY MASS INDEX: 33.74 KG/M2 | WEIGHT: 215 LBS | RESPIRATION RATE: 61 BRPM | OXYGEN SATURATION: 97 %

## 2024-06-10 PROCEDURE — 99024 POSTOP FOLLOW-UP VISIT: CPT

## 2024-06-13 ENCOUNTER — TRANSCRIPTION ENCOUNTER (OUTPATIENT)
Age: 54
End: 2024-06-13

## 2024-06-13 LAB — NON-GYNECOLOGICAL CYTOLOGY STUDY: SIGNIFICANT CHANGE UP

## 2024-06-14 ENCOUNTER — OUTPATIENT (OUTPATIENT)
Dept: OUTPATIENT SERVICES | Facility: HOSPITAL | Age: 54
LOS: 1 days | End: 2024-06-14
Payer: COMMERCIAL

## 2024-06-14 ENCOUNTER — APPOINTMENT (OUTPATIENT)
Dept: ULTRASOUND IMAGING | Facility: IMAGING CENTER | Age: 54
End: 2024-06-14
Payer: COMMERCIAL

## 2024-06-14 ENCOUNTER — RESULT REVIEW (OUTPATIENT)
Age: 54
End: 2024-06-14

## 2024-06-14 DIAGNOSIS — N64.89 OTHER SPECIFIED DISORDERS OF BREAST: ICD-10-CM

## 2024-06-14 DIAGNOSIS — Z90.13 ACQUIRED ABSENCE OF BILATERAL BREASTS AND NIPPLES: Chronic | ICD-10-CM

## 2024-06-14 DIAGNOSIS — Z98.890 OTHER SPECIFIED POSTPROCEDURAL STATES: Chronic | ICD-10-CM

## 2024-06-14 DIAGNOSIS — Z00.8 ENCOUNTER FOR OTHER GENERAL EXAMINATION: ICD-10-CM

## 2024-06-14 PROCEDURE — 19000 PUNCTURE ASPIR CYST BREAST: CPT | Mod: LT

## 2024-06-14 PROCEDURE — 76942 ECHO GUIDE FOR BIOPSY: CPT

## 2024-06-14 PROCEDURE — 76942 ECHO GUIDE FOR BIOPSY: CPT | Mod: 26

## 2024-06-14 PROCEDURE — 19000 PUNCTURE ASPIR CYST BREAST: CPT

## 2024-06-19 ENCOUNTER — APPOINTMENT (OUTPATIENT)
Dept: PLASTIC SURGERY | Facility: CLINIC | Age: 54
End: 2024-06-19
Payer: COMMERCIAL

## 2024-06-19 VITALS
SYSTOLIC BLOOD PRESSURE: 158 MMHG | HEIGHT: 67 IN | WEIGHT: 215 LBS | BODY MASS INDEX: 33.74 KG/M2 | TEMPERATURE: 97.2 F | HEART RATE: 78 BPM | RESPIRATION RATE: 16 BRPM | DIASTOLIC BLOOD PRESSURE: 94 MMHG | OXYGEN SATURATION: 99 %

## 2024-06-19 PROCEDURE — 99024 POSTOP FOLLOW-UP VISIT: CPT

## 2024-06-24 ENCOUNTER — APPOINTMENT (OUTPATIENT)
Dept: PLASTIC SURGERY | Facility: CLINIC | Age: 54
End: 2024-06-24
Payer: COMMERCIAL

## 2024-06-24 VITALS
RESPIRATION RATE: 16 BRPM | SYSTOLIC BLOOD PRESSURE: 161 MMHG | HEART RATE: 73 BPM | TEMPERATURE: 97.2 F | DIASTOLIC BLOOD PRESSURE: 101 MMHG | WEIGHT: 215 LBS | OXYGEN SATURATION: 98 % | BODY MASS INDEX: 33.74 KG/M2 | HEIGHT: 67 IN

## 2024-06-24 DIAGNOSIS — Z90.13 ACQUIRED ABSENCE OF BILATERAL BREASTS AND NIPPLES: ICD-10-CM

## 2024-06-24 DIAGNOSIS — S31.109A UNSPECIFIED OPEN WOUND OF ABDOMINAL WALL, UNSPECIFIED QUADRANT W/OUT PENETRATION INTO PERITONEAL CAVITY, INITIAL ENCOUNTER: ICD-10-CM

## 2024-06-24 PROCEDURE — 99024 POSTOP FOLLOW-UP VISIT: CPT

## 2024-07-17 ENCOUNTER — APPOINTMENT (OUTPATIENT)
Dept: PLASTIC SURGERY | Facility: CLINIC | Age: 54
End: 2024-07-17
Payer: COMMERCIAL

## 2024-07-17 VITALS
OXYGEN SATURATION: 99 % | SYSTOLIC BLOOD PRESSURE: 159 MMHG | TEMPERATURE: 98 F | HEIGHT: 67 IN | WEIGHT: 215 LBS | BODY MASS INDEX: 33.74 KG/M2 | DIASTOLIC BLOOD PRESSURE: 81 MMHG | HEART RATE: 71 BPM

## 2024-07-17 DIAGNOSIS — Z90.13 ACQUIRED ABSENCE OF BILATERAL BREASTS AND NIPPLES: ICD-10-CM

## 2024-07-17 PROCEDURE — 99024 POSTOP FOLLOW-UP VISIT: CPT

## 2024-09-03 ENCOUNTER — APPOINTMENT (OUTPATIENT)
Dept: PLASTIC SURGERY | Facility: CLINIC | Age: 54
End: 2024-09-03
Payer: COMMERCIAL

## 2024-09-03 VITALS
TEMPERATURE: 97.2 F | OXYGEN SATURATION: 97 % | HEIGHT: 67 IN | RESPIRATION RATE: 16 BRPM | SYSTOLIC BLOOD PRESSURE: 157 MMHG | HEART RATE: 74 BPM | BODY MASS INDEX: 33.74 KG/M2 | WEIGHT: 215 LBS | DIASTOLIC BLOOD PRESSURE: 101 MMHG

## 2024-09-03 DIAGNOSIS — D05.12 INTRADUCTAL CARCINOMA IN SITU OF LEFT BREAST: ICD-10-CM

## 2024-09-03 PROCEDURE — 99212 OFFICE O/P EST SF 10 MIN: CPT

## 2024-09-03 NOTE — PHYSICAL EXAM
[Normocephalic] : normocephalic [Atraumatic] : atraumatic [EOMI] : extra ocular movement intact [Sclera nonicteric] : sclera nonicteric [Supple] : supple [Examined in the supine and seated position] : examined in the supine and seated position [No dominant masses] : no dominant masses in right breast  [No dominant masses] : no dominant masses left breast [de-identified] : S/P bilateral skin sparing total mastectomies, bilateral DANY flap reconstruction. Bilateral diffuse edema, L>R, visible pores, and post-op thickening.

## 2024-09-03 NOTE — ASSESSMENT
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  1. Follow up office visit due 3/2025 2. Advised monthly self breast examinations and advised her to contact me if she has any concerns.

## 2024-09-03 NOTE — CONSULT LETTER
[Dear  ___] : Dear  [unfilled], [Courtesy Letter:] : I had the pleasure of seeing your patient, [unfilled], in my office today. [Please see my note below.] : Please see my note below. [Sincerely,] : Sincerely, [DrEric  ___] : Dr. HU [FreeTextEntry3] : Doris Melchor, RPA-C Breast Surgery [address] [place] [address], [place] [address] (Phone) [phone] (Fax) [phone]

## 2024-09-03 NOTE — PHYSICAL EXAM
[Normocephalic] : normocephalic [Atraumatic] : atraumatic [EOMI] : extra ocular movement intact [Sclera nonicteric] : sclera nonicteric [Supple] : supple [Examined in the supine and seated position] : examined in the supine and seated position [No dominant masses] : no dominant masses in right breast  [No dominant masses] : no dominant masses left breast [de-identified] : S/P bilateral skin sparing total mastectomies, bilateral DANY flap reconstruction. Bilateral diffuse edema, L>R, visible pores, and post-op thickening.

## 2024-09-03 NOTE — HISTORY OF PRESENT ILLNESS
[FreeTextEntry1] : She is a 54 year old female here for a follow up office visit.  She has a family history of breast cancer in her sister, who was diagnosed at age 55.  She underwent breast conserving therapy.  She states that she has had a recent recurrence and will be undergoing bilateral mastectomies in [place].  She does not think her sister had genetic testing. She has a past medical history of hypothyroid and HTN; she is status post partial thyroidectomy and multiple myomectomies. 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 10/26/2023 Bilateral mammogram: HamedmundKaykayludy Lifetime Risk: 44.2%. The breasts are extremely dense, which lowers the sensitivity of mammography. No suspicious mass, suspicious microcalcifications, or other sign of malignancy is identified. Bilateral ultrasound: No suspicious solid masses. BI-RADS 1 2/26/2024 Bilateral high risk screening baseline breast MRI: RIGHT BREAST: There are scattered enhancing nonspecific foci.  There is no suspicious enhancement in the right breast. LEFT BREAST: There is a focal area of clumped nonmass enhancement in the anterior slightly upper inner left breast spanning up to 2.4 cm in CC dimension and best seen on axial image 82, coronal image 282. There are scattered enhancing nonspecific foci. AXILLA/OTHER: There is no significant axillary or internal mammary lymphadenopathy. 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 ER+(>95%)/OH+(5%). These results are concordant, recommend surgical or oncologic management. 3/4/2024 [facility] Common Hereditary Cancers Panel negative 5/13/2024 Bilateral skin sparing total mastectomies, injection of bilateral breasts with Magtrace, bilateral DANY flap reconstruction. Pathology: 1.  Breast, right, total mastectomy -   Focal fibrocystic changes. -   Unremarkable nipple and skin. 2.  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 -   The DCIS measures 3.0 mm in greatest dimension and is present in 1 of 12 slides. -   No invasive carcinoma or lymphovascular invasion identified. -   The resection margins are negative for carcinoma.  DCIS is 13.0 mm from the nearest margin (anterior) and 35.0 mm from the posterior margin. -   Nipple and skin negative for carcinoma. -   Biopsy site changes. 3.  Internal mammary lymph node, right, biopsy -   One lymph node negative for metastatic carcinoma (0/1). 4.  Internal mammary lymph node, left, biopsy -   One lymph node negative for metastatic carcinoma (0/1). Medical oncology and radiation oncology consults not clinically indicated, as the surgical path revealed DCIS only with negative margins. 5/21/2024 Ultrasound-guided aspiration of a fluid collection in the left upper outer reconstructed breast 1:00 axis. 50 mL bloody/serosanguineous fluid was aspirated. The fluid was sent for cytology and for Gram stain and culture: negative. Plastics: Dr. Brennan/Dr. Wadsworth. She saw Francine 7/17/2024. She will see him again 9/28/2024 to discuss revision. Physical Therapy at [facility] 2 x a week. She has bilateral discomfort, L>R.

## 2024-09-03 NOTE — HISTORY OF PRESENT ILLNESS
[FreeTextEntry1] : She is a 54 year old female here for a follow up office visit.  She has a family history of breast cancer in her sister, who was diagnosed at age 55.  She underwent breast conserving therapy.  She states that she has had a recent recurrence and will be undergoing bilateral mastectomies in [place].  She does not think her sister had genetic testing. She has a past medical history of hypothyroid and HTN; she is status post partial thyroidectomy and multiple myomectomies. 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 10/26/2023 Bilateral mammogram: HamedmundKaykayludy Lifetime Risk: 44.2%. The breasts are extremely dense, which lowers the sensitivity of mammography. No suspicious mass, suspicious microcalcifications, or other sign of malignancy is identified. Bilateral ultrasound: No suspicious solid masses. BI-RADS 1 2/26/2024 Bilateral high risk screening baseline breast MRI: RIGHT BREAST: There are scattered enhancing nonspecific foci.  There is no suspicious enhancement in the right breast. LEFT BREAST: There is a focal area of clumped nonmass enhancement in the anterior slightly upper inner left breast spanning up to 2.4 cm in CC dimension and best seen on axial image 82, coronal image 282. There are scattered enhancing nonspecific foci. AXILLA/OTHER: There is no significant axillary or internal mammary lymphadenopathy. 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 ER+(>95%)/CA+(5%). These results are concordant, recommend surgical or oncologic management. 3/4/2024 [facility] Common Hereditary Cancers Panel negative 5/13/2024 Bilateral skin sparing total mastectomies, injection of bilateral breasts with Magtrace, bilateral DANY flap reconstruction. Pathology: 1.  Breast, right, total mastectomy -   Focal fibrocystic changes. -   Unremarkable nipple and skin. 2.  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 -   The DCIS measures 3.0 mm in greatest dimension and is present in 1 of 12 slides. -   No invasive carcinoma or lymphovascular invasion identified. -   The resection margins are negative for carcinoma.  DCIS is 13.0 mm from the nearest margin (anterior) and 35.0 mm from the posterior margin. -   Nipple and skin negative for carcinoma. -   Biopsy site changes. 3.  Internal mammary lymph node, right, biopsy -   One lymph node negative for metastatic carcinoma (0/1). 4.  Internal mammary lymph node, left, biopsy -   One lymph node negative for metastatic carcinoma (0/1). Medical oncology and radiation oncology consults not clinically indicated, as the surgical path revealed DCIS only with negative margins. 5/21/2024 Ultrasound-guided aspiration of a fluid collection in the left upper outer reconstructed breast 1:00 axis. 50 mL bloody/serosanguineous fluid was aspirated. The fluid was sent for cytology and for Gram stain and culture: negative. Plastics: Dr. Brennan/Dr. Wadsworth. She saw Francine 7/17/2024. She will see him again 9/28/2024 to discuss revision. Physical Therapy at [facility] 2 x a week. She has bilateral discomfort, L>R.

## 2024-09-03 NOTE — CONSULT LETTER
[Dear  ___] : Dear  [unfilled], [Courtesy Letter:] : I had the pleasure of seeing your patient, [unfilled], in my office today. [Please see my note below.] : Please see my note below. [Sincerely,] : Sincerely, [DrEric  ___] : Dr. HU [FreeTextEntry3] : Doris Melchor, RPA-C Breast Surgery [address] [place] [address], [place] [address] (Phone) [phone] (Fax) [phone]

## 2024-09-05 ENCOUNTER — NON-APPOINTMENT (OUTPATIENT)
Age: 54
End: 2024-09-05

## 2024-09-05 ENCOUNTER — APPOINTMENT (OUTPATIENT)
Dept: PHYSICAL MEDICINE AND REHAB | Facility: CLINIC | Age: 54
End: 2024-09-05

## 2024-09-05 DIAGNOSIS — I89.0 LYMPHEDEMA, NOT ELSEWHERE CLASSIFIED: ICD-10-CM

## 2024-09-05 PROCEDURE — 99203 OFFICE O/P NEW LOW 30 MIN: CPT

## 2024-09-10 PROBLEM — I89.0 LYMPHEDEMA OF BREAST: Status: ACTIVE | Noted: 2024-09-10

## 2024-09-10 NOTE — HISTORY OF PRESENT ILLNESS
[FreeTextEntry1] : 53 yo with left-sided breast cancer and 5/13/2024 Bilateral skin sparing total mastectomies, , bilateral DANY flap reconstruction. Today she is complaining of bilateral shooting pains, no burning.  She reports decreased range of motion of her shoulders although this is improving.  She reports left breast swelling and discomfort.  She has started lymphedema therapy.  No weakness.  Good energy level.

## 2024-09-10 NOTE — PHYSICAL EXAM
[FreeTextEntry1] : General Appearance: Well developed, well nourished, in no acute distress. Skin: Inspection of the skin reveals no redness.  HEENT: The sclerae were anicteric and conjunctivae were pink and moist.  Chest: Normal chest expansion. Breast incisions are clean, dry and intact. No seromas or neuromas are noted. + pitting/ swelling in left breast  Axilla: No masses are noted. No axillary cording is noted.  Abdomen: Soft and non-tender with normal bowel sounds. Musculoskeletal: Normal range of motion of bilateral shoulders. Negative impingement tests. Extremities: No edema is noted in bilateral upper extremities.  Neurologic: The patient has normal muscle strength in bilateral upper and lower extremities.

## 2024-09-23 ENCOUNTER — APPOINTMENT (OUTPATIENT)
Dept: PLASTIC SURGERY | Facility: CLINIC | Age: 54
End: 2024-09-23
Payer: COMMERCIAL

## 2024-09-23 VITALS
RESPIRATION RATE: 16 BRPM | DIASTOLIC BLOOD PRESSURE: 93 MMHG | SYSTOLIC BLOOD PRESSURE: 157 MMHG | TEMPERATURE: 97.2 F | BODY MASS INDEX: 33.74 KG/M2 | HEART RATE: 88 BPM | WEIGHT: 215 LBS | OXYGEN SATURATION: 98 % | HEIGHT: 67 IN

## 2024-09-23 DIAGNOSIS — Z90.13 ACQUIRED ABSENCE OF BILATERAL BREASTS AND NIPPLES: ICD-10-CM

## 2024-09-23 PROCEDURE — 99212 OFFICE O/P EST SF 10 MIN: CPT

## 2024-10-15 ENCOUNTER — APPOINTMENT (OUTPATIENT)
Dept: ORTHOPEDIC SURGERY | Facility: CLINIC | Age: 54
End: 2024-10-15
Payer: COMMERCIAL

## 2024-10-15 VITALS
HEART RATE: 66 BPM | DIASTOLIC BLOOD PRESSURE: 90 MMHG | SYSTOLIC BLOOD PRESSURE: 155 MMHG | WEIGHT: 215 LBS | BODY MASS INDEX: 33.74 KG/M2 | HEIGHT: 67 IN | OXYGEN SATURATION: 99 %

## 2024-10-15 DIAGNOSIS — M17.11 UNILATERAL PRIMARY OSTEOARTHRITIS, RIGHT KNEE: ICD-10-CM

## 2024-10-15 DIAGNOSIS — M17.0 BILATERAL PRIMARY OSTEOARTHRITIS OF KNEE: ICD-10-CM

## 2024-10-15 DIAGNOSIS — M17.12 UNILATERAL PRIMARY OSTEOARTHRITIS, LEFT KNEE: ICD-10-CM

## 2024-10-15 PROCEDURE — 99214 OFFICE O/P EST MOD 30 MIN: CPT | Mod: 25

## 2024-10-15 PROCEDURE — 20610 DRAIN/INJ JOINT/BURSA W/O US: CPT | Mod: 50

## 2024-11-21 ENCOUNTER — APPOINTMENT (OUTPATIENT)
Dept: BARIATRICS | Facility: CLINIC | Age: 54
End: 2024-11-21
Payer: COMMERCIAL

## 2024-11-21 VITALS
DIASTOLIC BLOOD PRESSURE: 80 MMHG | TEMPERATURE: 97.5 F | WEIGHT: 232.8 LBS | HEART RATE: 85 BPM | OXYGEN SATURATION: 99 % | BODY MASS INDEX: 37.41 KG/M2 | HEIGHT: 66 IN | SYSTOLIC BLOOD PRESSURE: 132 MMHG

## 2024-11-21 DIAGNOSIS — E46 UNSPECIFIED PROTEIN-CALORIE MALNUTRITION: ICD-10-CM

## 2024-11-21 DIAGNOSIS — Z86.39 PERSONAL HISTORY OF OTHER ENDOCRINE, NUTRITIONAL AND METABOLIC DISEASE: ICD-10-CM

## 2024-11-21 DIAGNOSIS — Z87.39 PERSONAL HISTORY OF OTHER DISEASES OF THE MUSCULOSKELETAL SYSTEM AND CONNECTIVE TISSUE: ICD-10-CM

## 2024-11-21 DIAGNOSIS — Z76.89 PERSONS ENCOUNTERING HEALTH SERVICES IN OTHER SPECIFIED CIRCUMSTANCES: ICD-10-CM

## 2024-11-21 DIAGNOSIS — Z01.812 ENCOUNTER FOR PREPROCEDURAL LABORATORY EXAMINATION: ICD-10-CM

## 2024-11-21 DIAGNOSIS — M25.40 EFFUSION, UNSPECIFIED JOINT: ICD-10-CM

## 2024-11-21 PROCEDURE — 99215 OFFICE O/P EST HI 40 MIN: CPT

## 2024-11-21 PROCEDURE — G2211 COMPLEX E/M VISIT ADD ON: CPT

## 2024-11-26 ENCOUNTER — NON-APPOINTMENT (OUTPATIENT)
Age: 54
End: 2024-11-26

## 2024-11-26 ENCOUNTER — OUTPATIENT (OUTPATIENT)
Dept: OUTPATIENT SERVICES | Facility: HOSPITAL | Age: 54
LOS: 1 days | End: 2024-11-26
Payer: COMMERCIAL

## 2024-11-26 ENCOUNTER — LABORATORY RESULT (OUTPATIENT)
Age: 54
End: 2024-11-26

## 2024-11-26 ENCOUNTER — APPOINTMENT (OUTPATIENT)
Dept: OBGYN | Facility: CLINIC | Age: 54
End: 2024-11-26
Payer: COMMERCIAL

## 2024-11-26 ENCOUNTER — APPOINTMENT (OUTPATIENT)
Dept: INTERNAL MEDICINE | Facility: CLINIC | Age: 54
End: 2024-11-26
Payer: COMMERCIAL

## 2024-11-26 VITALS — DIASTOLIC BLOOD PRESSURE: 98 MMHG | SYSTOLIC BLOOD PRESSURE: 140 MMHG

## 2024-11-26 VITALS — SYSTOLIC BLOOD PRESSURE: 146 MMHG | DIASTOLIC BLOOD PRESSURE: 88 MMHG

## 2024-11-26 VITALS
HEIGHT: 66 IN | BODY MASS INDEX: 37.77 KG/M2 | WEIGHT: 235 LBS | SYSTOLIC BLOOD PRESSURE: 158 MMHG | DIASTOLIC BLOOD PRESSURE: 103 MMHG

## 2024-11-26 VITALS
BODY MASS INDEX: 37.93 KG/M2 | HEART RATE: 76 BPM | SYSTOLIC BLOOD PRESSURE: 148 MMHG | DIASTOLIC BLOOD PRESSURE: 100 MMHG | WEIGHT: 235 LBS | OXYGEN SATURATION: 98 %

## 2024-11-26 DIAGNOSIS — D21.9 BENIGN NEOPLASM OF CONNECTIVE AND OTHER SOFT TISSUE, UNSPECIFIED: ICD-10-CM

## 2024-11-26 DIAGNOSIS — Z01.419 ENCOUNTER FOR GYNECOLOGICAL EXAMINATION (GENERAL) (ROUTINE) W/OUT ABNORMAL FINDINGS: ICD-10-CM

## 2024-11-26 DIAGNOSIS — E55.9 VITAMIN D DEFICIENCY, UNSPECIFIED: ICD-10-CM

## 2024-11-26 DIAGNOSIS — D05.12 INTRADUCTAL CARCINOMA IN SITU OF LEFT BREAST: ICD-10-CM

## 2024-11-26 DIAGNOSIS — Z98.890 OTHER SPECIFIED POSTPROCEDURAL STATES: Chronic | ICD-10-CM

## 2024-11-26 DIAGNOSIS — E66.812 OBESITY, CLASS 2: ICD-10-CM

## 2024-11-26 DIAGNOSIS — Z90.13 ACQUIRED ABSENCE OF BILATERAL BREASTS AND NIPPLES: ICD-10-CM

## 2024-11-26 DIAGNOSIS — I10 ESSENTIAL (PRIMARY) HYPERTENSION: ICD-10-CM

## 2024-11-26 DIAGNOSIS — Z91.89 OTHER SPECIFIED PERSONAL RISK FACTORS, NOT ELSEWHERE CLASSIFIED: ICD-10-CM

## 2024-11-26 DIAGNOSIS — E03.9 HYPOTHYROIDISM, UNSPECIFIED: ICD-10-CM

## 2024-11-26 DIAGNOSIS — Z90.13 ACQUIRED ABSENCE OF BILATERAL BREASTS AND NIPPLES: Chronic | ICD-10-CM

## 2024-11-26 LAB
ALBUMIN SERPL ELPH-MCNC: 4.3 G/DL
ALP BLD-CCNC: 65 U/L
ALT SERPL-CCNC: 10 U/L
ANION GAP SERPL CALC-SCNC: 12 MMOL/L
AST SERPL-CCNC: 22 U/L
BILIRUB SERPL-MCNC: 0.5 MG/DL
BUN SERPL-MCNC: 15 MG/DL
CALCIUM SERPL-MCNC: 9.8 MG/DL
CHLORIDE SERPL-SCNC: 105 MMOL/L
CHOLEST SERPL-MCNC: 162 MG/DL
CO2 SERPL-SCNC: 27 MMOL/L
CREAT SERPL-MCNC: 0.96 MG/DL
EGFR: 69 ML/MIN/1.73M2
ESTIMATED AVERAGE GLUCOSE: 77 MG/DL
GLUCOSE SERPL-MCNC: 92 MG/DL
HBA1C MFR BLD HPLC: 4.3 %
HDLC SERPL-MCNC: 81 MG/DL
LDLC SERPL CALC-MCNC: 71 MG/DL
NONHDLC SERPL-MCNC: 81 MG/DL
POTASSIUM SERPL-SCNC: 4.2 MMOL/L
PROT SERPL-MCNC: 7.2 G/DL
SODIUM SERPL-SCNC: 145 MMOL/L
TRIGL SERPL-MCNC: 50 MG/DL
TSH SERPL-ACNC: 0.27 UIU/ML

## 2024-11-26 PROCEDURE — G0444 DEPRESSION SCREEN ANNUAL: CPT | Mod: 59

## 2024-11-26 PROCEDURE — 82270 OCCULT BLOOD FECES: CPT

## 2024-11-26 PROCEDURE — 99459 PELVIC EXAMINATION: CPT

## 2024-11-26 PROCEDURE — G0008: CPT

## 2024-11-26 PROCEDURE — 99396 PREV VISIT EST AGE 40-64: CPT

## 2024-11-26 PROCEDURE — 99214 OFFICE O/P EST MOD 30 MIN: CPT

## 2024-11-26 PROCEDURE — 90656 IIV3 VACC NO PRSV 0.5 ML IM: CPT

## 2024-11-26 PROCEDURE — G0463: CPT

## 2024-11-26 RX ORDER — TELMISARTAN 20 MG/1
20 TABLET ORAL
Qty: 30 | Refills: 11 | Status: ACTIVE | COMMUNITY
Start: 2024-11-26 | End: 1900-01-01

## 2024-11-27 LAB
BASOPHILS # BLD AUTO: 0.02 K/UL
BASOPHILS NFR BLD AUTO: 0.8 %
C TRACH RRNA SPEC QL NAA+PROBE: NOT DETECTED
EOSINOPHIL # BLD AUTO: 0.07 K/UL
EOSINOPHIL NFR BLD AUTO: 2.9 %
HCT VFR BLD CALC: 42.5 %
HGB BLD-MCNC: 13.9 G/DL
HPV HIGH+LOW RISK DNA PNL CVX: NOT DETECTED
IMM GRANULOCYTES NFR BLD AUTO: 0 %
LYMPHOCYTES # BLD AUTO: 1.12 K/UL
LYMPHOCYTES NFR BLD AUTO: 45.7 %
MAN DIFF?: NORMAL
MCHC RBC-ENTMCNC: 27.5 PG
MCHC RBC-ENTMCNC: 32.7 G/DL
MCV RBC AUTO: 84.2 FL
MONOCYTES # BLD AUTO: 0.16 K/UL
MONOCYTES NFR BLD AUTO: 6.5 %
N GONORRHOEA RRNA SPEC QL NAA+PROBE: NOT DETECTED
NEUTROPHILS # BLD AUTO: 1.08 K/UL
NEUTROPHILS NFR BLD AUTO: 44.1 %
PLATELET # BLD AUTO: 184 K/UL
RBC # BLD: 5.05 M/UL
RBC # FLD: 15.8 %
SOURCE TP AMPLIFICATION: NORMAL
WBC # FLD AUTO: 2.45 K/UL

## 2024-12-01 LAB — CYTOLOGY CVX/VAG DOC THIN PREP: NORMAL

## 2024-12-03 DIAGNOSIS — D05.12 INTRADUCTAL CARCINOMA IN SITU OF LEFT BREAST: ICD-10-CM

## 2024-12-03 DIAGNOSIS — E55.9 VITAMIN D DEFICIENCY, UNSPECIFIED: ICD-10-CM

## 2024-12-03 DIAGNOSIS — Z91.89 OTHER SPECIFIED PERSONAL RISK FACTORS, NOT ELSEWHERE CLASSIFIED: ICD-10-CM

## 2024-12-03 DIAGNOSIS — Z90.13 ACQUIRED ABSENCE OF BILATERAL BREASTS AND NIPPLES: ICD-10-CM

## 2024-12-03 DIAGNOSIS — E03.9 HYPOTHYROIDISM, UNSPECIFIED: ICD-10-CM

## 2024-12-03 DIAGNOSIS — Z23 ENCOUNTER FOR IMMUNIZATION: ICD-10-CM

## 2024-12-03 DIAGNOSIS — E66.812 OBESITY, CLASS 2: ICD-10-CM

## 2024-12-20 ENCOUNTER — APPOINTMENT (OUTPATIENT)
Dept: ULTRASOUND IMAGING | Facility: HOSPITAL | Age: 54
End: 2024-12-20
Payer: COMMERCIAL

## 2024-12-20 ENCOUNTER — OUTPATIENT (OUTPATIENT)
Dept: OUTPATIENT SERVICES | Facility: HOSPITAL | Age: 54
LOS: 1 days | End: 2024-12-20
Payer: COMMERCIAL

## 2024-12-20 DIAGNOSIS — Z98.890 OTHER SPECIFIED POSTPROCEDURAL STATES: Chronic | ICD-10-CM

## 2024-12-20 DIAGNOSIS — Z01.419 ENCOUNTER FOR GYNECOLOGICAL EXAMINATION (GENERAL) (ROUTINE) WITHOUT ABNORMAL FINDINGS: ICD-10-CM

## 2024-12-20 DIAGNOSIS — Z90.13 ACQUIRED ABSENCE OF BILATERAL BREASTS AND NIPPLES: Chronic | ICD-10-CM

## 2024-12-20 DIAGNOSIS — Z00.00 ENCOUNTER FOR GENERAL ADULT MEDICAL EXAMINATION WITHOUT ABNORMAL FINDINGS: ICD-10-CM

## 2024-12-20 PROCEDURE — 76830 TRANSVAGINAL US NON-OB: CPT

## 2024-12-20 PROCEDURE — 76830 TRANSVAGINAL US NON-OB: CPT | Mod: 26

## 2024-12-20 PROCEDURE — 76856 US EXAM PELVIC COMPLETE: CPT

## 2024-12-20 PROCEDURE — 76856 US EXAM PELVIC COMPLETE: CPT | Mod: 26

## 2024-12-26 ENCOUNTER — APPOINTMENT (OUTPATIENT)
Dept: INTERNAL MEDICINE | Facility: CLINIC | Age: 54
End: 2024-12-26

## 2024-12-26 ENCOUNTER — OUTPATIENT (OUTPATIENT)
Dept: OUTPATIENT SERVICES | Facility: HOSPITAL | Age: 54
LOS: 1 days | End: 2024-12-26

## 2024-12-26 ENCOUNTER — NON-APPOINTMENT (OUTPATIENT)
Age: 54
End: 2024-12-26

## 2024-12-26 ENCOUNTER — TRANSCRIPTION ENCOUNTER (OUTPATIENT)
Age: 54
End: 2024-12-26

## 2024-12-26 DIAGNOSIS — Z90.13 ACQUIRED ABSENCE OF BILATERAL BREASTS AND NIPPLES: Chronic | ICD-10-CM

## 2024-12-26 DIAGNOSIS — Z98.890 OTHER SPECIFIED POSTPROCEDURAL STATES: Chronic | ICD-10-CM

## 2024-12-27 ENCOUNTER — TRANSCRIPTION ENCOUNTER (OUTPATIENT)
Age: 54
End: 2024-12-27

## 2025-01-02 RX ORDER — TIRZEPATIDE 2.5 MG/.5ML
2.5 INJECTION, SOLUTION SUBCUTANEOUS
Qty: 1 | Refills: 0 | Status: ACTIVE | COMMUNITY
Start: 2024-12-27

## 2025-01-31 ENCOUNTER — APPOINTMENT (OUTPATIENT)
Dept: PLASTIC SURGERY | Facility: CLINIC | Age: 55
End: 2025-01-31
Payer: COMMERCIAL

## 2025-01-31 VITALS
OXYGEN SATURATION: 98 % | BODY MASS INDEX: 35.52 KG/M2 | WEIGHT: 221 LBS | HEIGHT: 66 IN | SYSTOLIC BLOOD PRESSURE: 150 MMHG | TEMPERATURE: 97.1 F | HEART RATE: 64 BPM | DIASTOLIC BLOOD PRESSURE: 97 MMHG

## 2025-01-31 DIAGNOSIS — D05.12 INTRADUCTAL CARCINOMA IN SITU OF LEFT BREAST: ICD-10-CM

## 2025-01-31 PROCEDURE — 99213 OFFICE O/P EST LOW 20 MIN: CPT

## 2025-02-03 ENCOUNTER — APPOINTMENT (OUTPATIENT)
Dept: ENDOCRINOLOGY | Facility: CLINIC | Age: 55
End: 2025-02-03
Payer: COMMERCIAL

## 2025-02-03 VITALS
WEIGHT: 221 LBS | HEIGHT: 66 IN | SYSTOLIC BLOOD PRESSURE: 132 MMHG | BODY MASS INDEX: 35.52 KG/M2 | HEART RATE: 74 BPM | OXYGEN SATURATION: 98 % | DIASTOLIC BLOOD PRESSURE: 88 MMHG

## 2025-02-03 DIAGNOSIS — E66.812 OBESITY, CLASS 2: ICD-10-CM

## 2025-02-03 DIAGNOSIS — E04.1 NONTOXIC SINGLE THYROID NODULE: ICD-10-CM

## 2025-02-03 DIAGNOSIS — E03.9 HYPOTHYROIDISM, UNSPECIFIED: ICD-10-CM

## 2025-02-03 PROCEDURE — G2211 COMPLEX E/M VISIT ADD ON: CPT

## 2025-02-03 PROCEDURE — 99214 OFFICE O/P EST MOD 30 MIN: CPT

## 2025-02-05 NOTE — ED PROVIDER NOTE - INTERNATIONAL TRAVEL
Caller: Patient     Doctor:       Reason for call: Returning missed call re: changing appt from Dr Albert to Dr Redd. While on the call, the call disconnected suddenly    Call back#:    
No

## 2025-02-06 ENCOUNTER — TRANSCRIPTION ENCOUNTER (OUTPATIENT)
Age: 55
End: 2025-02-06

## 2025-02-06 LAB
ALBUMIN SERPL ELPH-MCNC: 4.5 G/DL
ALP BLD-CCNC: 63 U/L
ALT SERPL-CCNC: 7 U/L
ANION GAP SERPL CALC-SCNC: 12 MMOL/L
AST SERPL-CCNC: 20 U/L
BILIRUB SERPL-MCNC: 0.4 MG/DL
BUN SERPL-MCNC: 19 MG/DL
CALCIUM SERPL-MCNC: 9.7 MG/DL
CHLORIDE SERPL-SCNC: 105 MMOL/L
CHOLEST SERPL-MCNC: 170 MG/DL
CO2 SERPL-SCNC: 27 MMOL/L
CREAT SERPL-MCNC: 0.91 MG/DL
EGFR: 74 ML/MIN/1.73M2
ESTIMATED AVERAGE GLUCOSE: 82 MG/DL
GLUCOSE SERPL-MCNC: 74 MG/DL
HBA1C MFR BLD HPLC: 4.5 %
HDLC SERPL-MCNC: 78 MG/DL
LDLC SERPL CALC-MCNC: 81 MG/DL
NONHDLC SERPL-MCNC: 91 MG/DL
POTASSIUM SERPL-SCNC: 4.4 MMOL/L
PROT SERPL-MCNC: 7.2 G/DL
SODIUM SERPL-SCNC: 144 MMOL/L
TRIGL SERPL-MCNC: 48 MG/DL
TSH SERPL-ACNC: 0.39 UIU/ML

## 2025-02-10 ENCOUNTER — TRANSCRIPTION ENCOUNTER (OUTPATIENT)
Age: 55
End: 2025-02-10

## 2025-02-10 ENCOUNTER — APPOINTMENT (OUTPATIENT)
Dept: PLASTIC SURGERY | Facility: CLINIC | Age: 55
End: 2025-02-10

## 2025-02-10 ENCOUNTER — APPOINTMENT (OUTPATIENT)
Dept: PLASTIC SURGERY | Facility: CLINIC | Age: 55
End: 2025-02-10
Payer: COMMERCIAL

## 2025-02-10 VITALS
OXYGEN SATURATION: 99 % | BODY MASS INDEX: 35.2 KG/M2 | HEIGHT: 66 IN | TEMPERATURE: 97.9 F | SYSTOLIC BLOOD PRESSURE: 160 MMHG | HEART RATE: 89 BPM | DIASTOLIC BLOOD PRESSURE: 99 MMHG | RESPIRATION RATE: 16 BRPM | WEIGHT: 219 LBS

## 2025-02-10 DIAGNOSIS — Z90.13 ACQUIRED ABSENCE OF BILATERAL BREASTS AND NIPPLES: ICD-10-CM

## 2025-02-10 PROCEDURE — 99215 OFFICE O/P EST HI 40 MIN: CPT

## 2025-02-26 NOTE — PRE-OP CHECKLIST - WAS PATIENT ON BETA BLOCKER?
Hx of recurrent bacterial vaginosis   Clue cells on wet mt on admission 2/23 as well as budding yeast      Plan:   Bacterial vaginosis: Metronidazole 500mg BID X 7 days   Yeast infection: 1 dose Diflucan 200mg     No

## 2025-03-06 ENCOUNTER — TRANSCRIPTION ENCOUNTER (OUTPATIENT)
Age: 55
End: 2025-03-06

## 2025-03-12 ENCOUNTER — APPOINTMENT (OUTPATIENT)
Dept: BARIATRICS | Facility: CLINIC | Age: 55
End: 2025-03-12
Payer: COMMERCIAL

## 2025-03-12 VITALS
BODY MASS INDEX: 34.62 KG/M2 | SYSTOLIC BLOOD PRESSURE: 156 MMHG | OXYGEN SATURATION: 95 % | HEART RATE: 70 BPM | HEIGHT: 66 IN | TEMPERATURE: 97.3 F | WEIGHT: 215.39 LBS | DIASTOLIC BLOOD PRESSURE: 95 MMHG

## 2025-03-12 DIAGNOSIS — E66.812 OBESITY, CLASS 2: ICD-10-CM

## 2025-03-12 DIAGNOSIS — E46 UNSPECIFIED PROTEIN-CALORIE MALNUTRITION: ICD-10-CM

## 2025-03-12 DIAGNOSIS — Z86.39 PERSONAL HISTORY OF OTHER ENDOCRINE, NUTRITIONAL AND METABOLIC DISEASE: ICD-10-CM

## 2025-03-12 DIAGNOSIS — Z79.899 OTHER LONG TERM (CURRENT) DRUG THERAPY: ICD-10-CM

## 2025-03-12 DIAGNOSIS — Z76.89 PERSONS ENCOUNTERING HEALTH SERVICES IN OTHER SPECIFIED CIRCUMSTANCES: ICD-10-CM

## 2025-03-12 DIAGNOSIS — E66.9 OBESITY, UNSPECIFIED: ICD-10-CM

## 2025-03-12 PROCEDURE — 99213 OFFICE O/P EST LOW 20 MIN: CPT

## 2025-03-12 RX ORDER — COLLAGEN, HYDROLYSATE (BOVINE) 100 %
POWDER (GRAM) MISCELLANEOUS
Refills: 0 | Status: ACTIVE | COMMUNITY

## 2025-03-27 ENCOUNTER — OUTPATIENT (OUTPATIENT)
Dept: OUTPATIENT SERVICES | Facility: HOSPITAL | Age: 55
LOS: 1 days | End: 2025-03-27
Payer: COMMERCIAL

## 2025-03-27 ENCOUNTER — APPOINTMENT (OUTPATIENT)
Dept: BARIATRICS/WEIGHT MGMT | Facility: CLINIC | Age: 55
End: 2025-03-27

## 2025-03-27 DIAGNOSIS — Z90.13 ACQUIRED ABSENCE OF BILATERAL BREASTS AND NIPPLES: Chronic | ICD-10-CM

## 2025-03-27 DIAGNOSIS — E04.1 NONTOXIC SINGLE THYROID NODULE: ICD-10-CM

## 2025-03-27 DIAGNOSIS — Z98.890 OTHER SPECIFIED POSTPROCEDURAL STATES: Chronic | ICD-10-CM

## 2025-03-27 PROCEDURE — 76536 US EXAM OF HEAD AND NECK: CPT | Mod: 26

## 2025-03-27 PROCEDURE — 76536 US EXAM OF HEAD AND NECK: CPT

## 2025-04-09 ENCOUNTER — APPOINTMENT (OUTPATIENT)
Dept: BARIATRICS | Facility: CLINIC | Age: 55
End: 2025-04-09

## 2025-04-14 DIAGNOSIS — R39.9 UNSPECIFIED SYMPTOMS AND SIGNS INVOLVING THE GENITOURINARY SYSTEM: ICD-10-CM

## 2025-04-14 RX ORDER — NITROFURANTOIN (MONOHYDRATE/MACROCRYSTALS) 25; 75 MG/1; MG/1
100 CAPSULE ORAL
Qty: 10 | Refills: 0 | Status: ACTIVE | COMMUNITY
Start: 2025-04-14

## 2025-04-15 LAB
APPEARANCE: ABNORMAL
BACTERIA: ABNORMAL /HPF
BILIRUBIN URINE: NEGATIVE
BLOOD URINE: ABNORMAL
CAST: 1 /LPF
COLOR: NORMAL
EPITHELIAL CELLS: 3 /HPF
GLUCOSE QUALITATIVE U: NEGATIVE MG/DL
KETONES URINE: ABNORMAL MG/DL
LEUKOCYTE ESTERASE URINE: ABNORMAL
MICROSCOPIC-UA: NORMAL
NITRITE URINE: POSITIVE
PH URINE: 6
PROTEIN URINE: 100 MG/DL
RED BLOOD CELLS URINE: 115 /HPF
REVIEW: NORMAL
SPECIFIC GRAVITY URINE: 1.02
UROBILINOGEN URINE: 0.2 MG/DL
WHITE BLOOD CELLS URINE: 957 /HPF

## 2025-04-17 LAB — BACTERIA UR CULT: ABNORMAL

## 2025-04-25 ENCOUNTER — RESULT REVIEW (OUTPATIENT)
Age: 55
End: 2025-04-25

## 2025-04-30 ENCOUNTER — NON-APPOINTMENT (OUTPATIENT)
Age: 55
End: 2025-04-30

## 2025-05-02 ENCOUNTER — APPOINTMENT (OUTPATIENT)
Dept: BARIATRICS/WEIGHT MGMT | Facility: CLINIC | Age: 55
End: 2025-05-02
Payer: COMMERCIAL

## 2025-05-02 DIAGNOSIS — E46 UNSPECIFIED PROTEIN-CALORIE MALNUTRITION: ICD-10-CM

## 2025-05-02 DIAGNOSIS — I10 ESSENTIAL (PRIMARY) HYPERTENSION: ICD-10-CM

## 2025-05-02 DIAGNOSIS — E66.9 OBESITY, UNSPECIFIED: ICD-10-CM

## 2025-05-02 PROCEDURE — 97802 MEDICAL NUTRITION INDIV IN: CPT | Mod: 95

## 2025-05-28 ENCOUNTER — APPOINTMENT (OUTPATIENT)
Dept: BARIATRICS | Facility: CLINIC | Age: 55
End: 2025-05-28
Payer: COMMERCIAL

## 2025-05-28 VITALS
WEIGHT: 206.13 LBS | HEIGHT: 66 IN | TEMPERATURE: 97 F | OXYGEN SATURATION: 96 % | DIASTOLIC BLOOD PRESSURE: 93 MMHG | HEART RATE: 69 BPM | BODY MASS INDEX: 33.13 KG/M2 | SYSTOLIC BLOOD PRESSURE: 149 MMHG

## 2025-05-28 DIAGNOSIS — R63.4 ABNORMAL WEIGHT LOSS: ICD-10-CM

## 2025-05-28 DIAGNOSIS — E66.9 OBESITY, UNSPECIFIED: ICD-10-CM

## 2025-05-28 DIAGNOSIS — E46 UNSPECIFIED PROTEIN-CALORIE MALNUTRITION: ICD-10-CM

## 2025-05-28 DIAGNOSIS — Z79.899 OTHER LONG TERM (CURRENT) DRUG THERAPY: ICD-10-CM

## 2025-05-28 PROCEDURE — 99213 OFFICE O/P EST LOW 20 MIN: CPT

## 2025-06-13 ENCOUNTER — APPOINTMENT (OUTPATIENT)
Dept: BARIATRICS/WEIGHT MGMT | Facility: CLINIC | Age: 55
End: 2025-06-13
Payer: COMMERCIAL

## 2025-06-13 PROCEDURE — 97803 MED NUTRITION INDIV SUBSEQ: CPT | Mod: 95

## 2025-06-23 NOTE — ED ADULT NURSE NOTE - CAS EDN DISCHARGE INTERVENTIONS
"Subjective   Patient ID: Gogo Hutchinson is a 52 y.o. female who presents for Med Refill.    HPI     Pt is here for refills of medications to treat the following medical conditions. Unless otherwise stated, these conditions are well-controlled, pt is taking medications as Rx, and there are no reported side effects to medications or other treatment:  Depression, hyperlipidemia, migraines and obesity treatment. PT also reports she was intolerant of Chantix - due to vivid dreams.     Review of Systems  Constitutional: Negative.    HENT: Negative.     Respiratory: Negative.  Negative for cough, shortness of breath and wheezing.    Cardiovascular: Negative.  Negative for chest pain and palpitations.   Gastrointestinal: Negative.    Musculoskeletal: Negative.    Neurological: Negative.    Hematological: Negative.    Psychiatric/Behavioral: Negative.     All other systems reviewed and are negative.      Objective   /62 (BP Location: Right arm, Patient Position: Sitting, BP Cuff Size: Adult)   Pulse 91   Temp 36.8 °C (98.2 °F) (Oral)   Ht 1.626 m (5' 4\")   Wt 69.4 kg (153 lb)   SpO2 96%   BMI 26.26 kg/m²     Physical Exam  Vitals and nursing note reviewed.   Constitutional:       General Appearance: Normal appearance, no distress, not ill-appearing.   HENT:      Head: Normocephalic and atraumatic.      Mouth/Throat:  MMM, Oropharynx is clear without erythema or exudate  Eyes:      Conjunctiva/sclera: Conjunctivae normal.   Cardiovascular:      Normal rate and regular rhythm: Normal heart sounds.   Pulmonary:      Pulmonary effort is normal. Normal breath sounds. No wheezing.   Abdominal:      General: Bowel sounds are normal. Abdomen is soft, non- tender, no masses.  Musculoskeletal:         General: Normal range of motion.    Lymphadenopathy:      Cervical:  Supple, non-tender, no cervical adenopathy.   Skin:     General: Skin is warm and dry, no rash or jaundice.    Neurological:      No focal deficit present. " Alert and oriented to person, place, and time.   Psychiatric:         Mood and Affect: Mood normal.         Behavior: Behavior normal.         Thought Content: Thought content normal.         Judgment: Judgment normal.     Assessment/Plan  Pt presents for chronic medication refills without request to change to another agent as she did not do well with chantix due to vivid dreams. Will discontinue and change her over to Nicoderm patches as directed. Will renew the remainder of her chronic medications without changes and check labs. Will follow up once testing is completed. Pt will be set up for mammogram testing again - she wishes to have testing done at [facility]. She reports she completed her cologuard testing - will have staff follow up with provider. PT will return in 3 mo for refills - will recommend well exam at that time. We will see her then - sooner if she has any complications.   Diagnoses and all orders for this visit:  Hyperlipidemia, unspecified hyperlipidemia type  -     rosuvastatin (Crestor) 20 mg tablet; Take 1 tablet (20 mg) by mouth once daily.  -     Lipid Panel; Future  -     Comprehensive Metabolic Panel; Future  Other migraine without status migrainosus, not intractable  -     amitriptyline (Elavil) 25 mg tablet; Take 1 tablet (25 mg) by mouth once daily at bedtime.  Depression, unspecified depression type  -     sertraline (Zoloft) 50 mg tablet; Take 1 tablet (50 mg) by mouth once daily.  Breast cancer screening by mammogram  -     BI mammo bilateral screening tomosynthesis; Future  Tobacco abuse  -     nicotine (Nicoderm CQ) 21 mg/24 hr patch; Place 1 patch over 24 hours on the skin once every 24 hours.  -     nicotine (Nicoderm CQ) 14 mg/24 hr patch; Place 1 patch over 24 hours on the skin once every 24 hours for 14 days.  -     nicotine (Nicoderm CQ) 7 mg/24 hr patch; Place 1 patch over 24 hours on the skin once every 24 hours for 14 days.  Tobacco abuse counseling  -     nicotine (Nicoderm  CQ) 21 mg/24 hr patch; Place 1 patch over 24 hours on the skin once every 24 hours.  -     nicotine (Nicoderm CQ) 14 mg/24 hr patch; Place 1 patch over 24 hours on the skin once every 24 hours for 14 days.  -     nicotine (Nicoderm CQ) 7 mg/24 hr patch; Place 1 patch over 24 hours on the skin once every 24 hours for 14 days.  Well adult exam  -     Comprehensive Metabolic Panel; Future  -     Lipid Panel; Future  -     CBC; Future  -     Hemoglobin A1C; Future  Overweight (BMI 25.0-29.9)       - Continue with Semiglutide - renewal sent to [facility]         IV discontinued, cath removed intact

## 2025-07-01 ENCOUNTER — APPOINTMENT (OUTPATIENT)
Dept: PLASTIC SURGERY | Facility: CLINIC | Age: 55
End: 2025-07-01
Payer: COMMERCIAL

## 2025-07-01 VITALS
BODY MASS INDEX: 32.3 KG/M2 | HEIGHT: 66 IN | TEMPERATURE: 97.6 F | RESPIRATION RATE: 16 BRPM | OXYGEN SATURATION: 98 % | SYSTOLIC BLOOD PRESSURE: 145 MMHG | HEART RATE: 68 BPM | WEIGHT: 201 LBS | DIASTOLIC BLOOD PRESSURE: 97 MMHG

## 2025-07-01 PROCEDURE — 99213 OFFICE O/P EST LOW 20 MIN: CPT

## 2025-08-06 ENCOUNTER — APPOINTMENT (OUTPATIENT)
Dept: BARIATRICS/WEIGHT MGMT | Facility: CLINIC | Age: 55
End: 2025-08-06
Payer: COMMERCIAL

## 2025-08-06 ENCOUNTER — NON-APPOINTMENT (OUTPATIENT)
Age: 55
End: 2025-08-06

## 2025-08-06 DIAGNOSIS — E46 UNSPECIFIED PROTEIN-CALORIE MALNUTRITION: ICD-10-CM

## 2025-08-06 DIAGNOSIS — E66.812 OBESITY, CLASS 2: ICD-10-CM

## 2025-08-06 DIAGNOSIS — I10 ESSENTIAL (PRIMARY) HYPERTENSION: ICD-10-CM

## 2025-08-06 PROCEDURE — 97803 MED NUTRITION INDIV SUBSEQ: CPT | Mod: 95

## 2025-08-12 ENCOUNTER — APPOINTMENT (OUTPATIENT)
Dept: ENDOCRINOLOGY | Facility: CLINIC | Age: 55
End: 2025-08-12

## 2025-08-15 ENCOUNTER — TRANSCRIPTION ENCOUNTER (OUTPATIENT)
Age: 55
End: 2025-08-15

## 2025-08-26 ENCOUNTER — TRANSCRIPTION ENCOUNTER (OUTPATIENT)
Age: 55
End: 2025-08-26

## 2025-09-02 ENCOUNTER — APPOINTMENT (OUTPATIENT)
Dept: ENDOCRINOLOGY | Facility: CLINIC | Age: 55
End: 2025-09-02

## 2025-09-10 ENCOUNTER — APPOINTMENT (OUTPATIENT)
Dept: BARIATRICS | Facility: CLINIC | Age: 55
End: 2025-09-10
Payer: COMMERCIAL

## 2025-09-10 VITALS
DIASTOLIC BLOOD PRESSURE: 78 MMHG | WEIGHT: 196.38 LBS | HEART RATE: 61 BPM | SYSTOLIC BLOOD PRESSURE: 120 MMHG | HEIGHT: 66 IN | BODY MASS INDEX: 31.56 KG/M2 | OXYGEN SATURATION: 98 % | TEMPERATURE: 97.4 F

## 2025-09-10 DIAGNOSIS — E46 UNSPECIFIED PROTEIN-CALORIE MALNUTRITION: ICD-10-CM

## 2025-09-10 DIAGNOSIS — E66.9 OBESITY, UNSPECIFIED: ICD-10-CM

## 2025-09-10 DIAGNOSIS — R63.4 ABNORMAL WEIGHT LOSS: ICD-10-CM

## 2025-09-10 PROCEDURE — 99213 OFFICE O/P EST LOW 20 MIN: CPT

## (undated) DEVICE — SENTIMAG MAGTRACE TRACER INJECT MAGNETIC 2ML VIAL

## (undated) DEVICE — Device

## (undated) DEVICE — MARKING PEN W RULER

## (undated) DEVICE — SUT VICRYL 2-0 27" CT-1 UNDYED

## (undated) DEVICE — TUBING TRUWAVE PRESSURE MALE/FEMALE 12"

## (undated) DEVICE — VENODYNE/SCD SLEEVE CALF MEDIUM

## (undated) DEVICE — ELCTR BOVIE TIP BLADE INSULATED 2.75" EDGE

## (undated) DEVICE — DRAPE FLUID WARMER 44 X 44"

## (undated) DEVICE — SOL IRR POUR H2O 500ML

## (undated) DEVICE — GOWN LG

## (undated) DEVICE — TUBING INFILTRATION

## (undated) DEVICE — ELCTR GROUNDING PAD ADULT COVIDIEN

## (undated) DEVICE — DRAPE 3/4 SHEET 52X76"

## (undated) DEVICE — SPECIMEN TRAP 70ML

## (undated) DEVICE — FOLEY TRAY 16FR 5CC LF UMETER CLOSED

## (undated) DEVICE — SOL IRR POUR H2O 1500ML

## (undated) DEVICE — SUT PLAIN GUT 5-0 12" S-14

## (undated) DEVICE — SUT QUILL MONODERM 2-0 14CM 26MM UNDYED

## (undated) DEVICE — SUT PROLENE 5-0 36" RB-1

## (undated) DEVICE — TRAP SPECIMEN SPUTUM 40CC

## (undated) DEVICE — TUBING IV EXTENSION MACRO W CLAVE 7"

## (undated) DEVICE — MERCIAN VISABILITY BACKROUND YELLOW

## (undated) DEVICE — DOPPLER PROBE  CABLE

## (undated) DEVICE — ELCTR BOVIE PENCIL BLADE 10FT

## (undated) DEVICE — WARMING BLANKET LOWER ADULT

## (undated) DEVICE — PACK MAJOR ABDOMINAL WITH LAP

## (undated) DEVICE — STAPLER SKIN VISI-STAT 35 WIDE

## (undated) DEVICE — LONE STAR RETRACTOR RING 12MM BLUNT DISP

## (undated) DEVICE — PREP CHLORAPREP HI-LITE ORANGE 26ML

## (undated) DEVICE — BIPOLAR FORCEP CORD WECK STANDARD 12FT

## (undated) DEVICE — ONETRAC LIGHTED RETRACTOR 135 X 30MM DISP

## (undated) DEVICE — NDL COUNTER FOAM AND MAGNET 20-40

## (undated) DEVICE — TUBING HI-VAC PSI-TEC STERILE

## (undated) DEVICE — FRAZIER CONNECTING TUBE 2FT 5MM

## (undated) DEVICE — ELCTR BOVIE TIP NEEDLE INSULATED 2.8" EDGE

## (undated) DEVICE — DRSG XEROFORM 5 X 9"

## (undated) DEVICE — SUT MONOCRYL 3-0 27" PS-2 UNDYED

## (undated) DEVICE — BIPOLAR FORCEP KIRWAN JEWELERS STR 4" X 0.4MM W 12FT CORD (GREEN)

## (undated) DEVICE — CLAMP MICROVASCULAR SINGLE  1-2MM

## (undated) DEVICE — SUT PDS II 3-0 27" SH UNDYED

## (undated) DEVICE — STAPLER SKIN MULTI DIRECTION W35

## (undated) DEVICE — SYR LUER LOK 3CC

## (undated) DEVICE — CATH IV SAFE INSYTE 14G X 1.75" (ORANGE)

## (undated) DEVICE — NDL HYPO REGULAR BEVEL 25G X 1.5" (BLUE)

## (undated) DEVICE — COTTONBALL LG

## (undated) DEVICE — DRAIN RESERVOIR FOR JACKSON PRATT 100CC CARDINAL

## (undated) DEVICE — GEL ULTRASOUND 0.25L

## (undated) DEVICE — DRSG STERISTRIPS 0.5 X 4"

## (undated) DEVICE — SUT ETHILON 8-0 5" BV130-5

## (undated) DEVICE — SYR LUER LOK 10CC

## (undated) DEVICE — DRAPE INSTRUMENT POUCH 6.75" X 11"

## (undated) DEVICE — DRSG TEGADERM 3.5X6"

## (undated) DEVICE — SUT NYLON 2-0 18" FS

## (undated) DEVICE — SOL IRR POUR NS 0.9% 1500ML

## (undated) DEVICE — GLV 7.5 PROTEXIS (CREAM) MICRO

## (undated) DEVICE — DRSG MASTISOL

## (undated) DEVICE — GLV 7 PROTEXIS (WHITE)

## (undated) DEVICE — FRAZIER SUCTION TIP 8FR

## (undated) DEVICE — LABELS BLANK W PEN

## (undated) DEVICE — POSITIONER STRAP ARMBOARD VELCRO TS-30

## (undated) DEVICE — BASIN SET DOUBLE

## (undated) DEVICE — DRSG CURITY GAUZE SPONGE 4 X 4" 12-PLY

## (undated) DEVICE — DRAPE ARMATEC HANDLE 5" X 10"

## (undated) DEVICE — CLAMP MICROVASCULAR DOUBLE  1-2MM

## (undated) DEVICE — BLADE SURGICAL #15 CARBON

## (undated) DEVICE — SPEAR SURG EYE WECK-CELL CELOS

## (undated) DEVICE — SYR LUER LOK 50CC

## (undated) DEVICE — SUT VICRYL 0 36" CT-1 UNDYED

## (undated) DEVICE — DRAPE IOBAN 33" X 23"

## (undated) DEVICE — PACK FREE FLAP

## (undated) DEVICE — SOL IRR POUR NS 0.9% 500ML

## (undated) DEVICE — DRAPE SPLIT SHEET 77" X 120"

## (undated) DEVICE — DRSG DERMABOND MINI

## (undated) DEVICE — DRSG DERMABOND PRINEO 60CM